# Patient Record
Sex: MALE | Race: BLACK OR AFRICAN AMERICAN | NOT HISPANIC OR LATINO | Employment: FULL TIME | URBAN - METROPOLITAN AREA
[De-identification: names, ages, dates, MRNs, and addresses within clinical notes are randomized per-mention and may not be internally consistent; named-entity substitution may affect disease eponyms.]

---

## 2023-06-22 ENCOUNTER — TELEPHONE (OUTPATIENT)
Dept: PRIMARY CARE | Facility: CLINIC | Age: 33
End: 2023-06-22
Payer: MEDICAID

## 2023-06-22 NOTE — TELEPHONE ENCOUNTER
Spoke with patients mother.Please call patients mother (legal guardian)Patients mother called to ask advice on whether she should take her son to the ER or should she schedule him with you to evaluate nose bleeds that he has had on/off

## 2023-08-06 DIAGNOSIS — E56.9 VITAMIN DEFICIENCY: Primary | ICD-10-CM

## 2023-08-06 RX ORDER — MULTIVITAMIN
1 TABLET ORAL DAILY
Qty: 30 TABLET | Refills: 11 | Status: SHIPPED | OUTPATIENT
Start: 2023-08-06 | End: 2024-08-05

## 2023-08-07 ENCOUNTER — PATIENT OUTREACH (OUTPATIENT)
Dept: PRIMARY CARE | Facility: CLINIC | Age: 33
End: 2023-08-07
Payer: MEDICAID

## 2023-08-07 DIAGNOSIS — K31.84 GASTROPARESIS: ICD-10-CM

## 2023-08-07 DIAGNOSIS — K30 GASTRIC MOTILITY DISORDER: ICD-10-CM

## 2023-08-07 NOTE — PROGRESS NOTES
Discharge Facility:[facility]  Discharge Diagnosis:Gastroparesis, Gastric Motility  Admission Date:8/2/23  Discharge Date: 8/6/23    PCP Appointment Date:Patient needs appointment  Specialist Appointment Date:   Hospital Encounter and Summary: Linked   See discharge assessment below for further details

## 2023-08-26 PROBLEM — G47.30 SLEEP-DISORDERED BREATHING: Status: ACTIVE | Noted: 2023-08-26

## 2023-08-26 PROBLEM — R06.81 WITNESSED EPISODE OF APNEA: Status: ACTIVE | Noted: 2023-08-26

## 2023-08-26 PROBLEM — B00.9 RECURRENT HSV (HERPES SIMPLEX VIRUS): Status: ACTIVE | Noted: 2023-08-26

## 2023-08-26 PROBLEM — M25.562 BILATERAL KNEE PAIN: Status: ACTIVE | Noted: 2023-08-26

## 2023-08-26 PROBLEM — R32 URINARY INCONTINENCE: Status: ACTIVE | Noted: 2023-08-26

## 2023-08-26 PROBLEM — E55.9 VITAMIN D DEFICIENCY: Status: ACTIVE | Noted: 2023-08-26

## 2023-08-26 PROBLEM — M25.551 BILATERAL HIP PAIN: Status: ACTIVE | Noted: 2023-08-26

## 2023-08-26 PROBLEM — R25.2 MUSCLE CRAMPS: Status: ACTIVE | Noted: 2023-08-26

## 2023-08-26 PROBLEM — F99 MENTAL DISORDER: Status: ACTIVE | Noted: 2023-08-26

## 2023-08-26 PROBLEM — B35.6 TINEA CRURIS: Status: ACTIVE | Noted: 2023-08-26

## 2023-08-26 PROBLEM — R73.9 HYPERGLYCEMIA: Status: ACTIVE | Noted: 2023-08-26

## 2023-08-26 PROBLEM — F91.3 OPPOSITIONAL DEFIANT DISORDER: Status: ACTIVE | Noted: 2023-08-26

## 2023-08-26 PROBLEM — J34.89 NASAL DRYNESS: Status: ACTIVE | Noted: 2023-08-26

## 2023-08-26 PROBLEM — R27.0 ATAXIA: Status: ACTIVE | Noted: 2023-08-26

## 2023-08-26 PROBLEM — E78.00 HYPERCHOLESTEROLEMIA: Status: ACTIVE | Noted: 2023-08-26

## 2023-08-26 PROBLEM — K08.9 DENTAL DISORDER: Status: ACTIVE | Noted: 2023-08-26

## 2023-08-26 PROBLEM — B35.4 TINEA CORPORIS: Status: ACTIVE | Noted: 2023-08-26

## 2023-08-26 PROBLEM — F43.10 PTSD (POST-TRAUMATIC STRESS DISORDER): Status: ACTIVE | Noted: 2023-08-26

## 2023-08-26 PROBLEM — S99.922A INJURY OF ANKLE AND FOOT, LEFT, INITIAL ENCOUNTER: Status: ACTIVE | Noted: 2023-08-26

## 2023-08-26 PROBLEM — R26.81 UNSTEADY GAIT WHEN WALKING: Status: ACTIVE | Noted: 2023-08-26

## 2023-08-26 PROBLEM — R74.8 ELEVATED CREATINE KINASE LEVEL: Status: ACTIVE | Noted: 2023-08-26

## 2023-08-26 PROBLEM — E65 CENTRAL OBESITY: Status: ACTIVE | Noted: 2023-08-26
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
## 2023-08-26 PROBLEM — S01.85XA DOG BITE OF FACE: Status: ACTIVE | Noted: 2023-08-26

## 2023-08-26 PROBLEM — M25.552 BILATERAL HIP PAIN: Status: ACTIVE | Noted: 2023-08-26

## 2023-08-26 PROBLEM — K31.89 GASTRIC DYSMOTILITY: Status: ACTIVE | Noted: 2023-08-26

## 2023-08-26 PROBLEM — F51.5 NIGHTMARE DISORDER: Status: ACTIVE | Noted: 2023-08-26

## 2023-08-26 PROBLEM — F06.31 MOOD DISORDER WITH DEPRESSIVE FEATURES DUE TO GENERAL MEDICAL CONDITION: Status: ACTIVE | Noted: 2023-08-26

## 2023-08-26 PROBLEM — R94.31 ACUTE ELECTROCARDIOGRAPHY CHANGES: Status: ACTIVE | Noted: 2023-08-26

## 2023-08-26 PROBLEM — F42.9 OBSESSIVE COMPULSIVE DISORDER: Status: ACTIVE | Noted: 2023-08-26

## 2023-08-26 PROBLEM — R25.8 NOCTURNAL LEG MOVEMENTS: Status: ACTIVE | Noted: 2023-08-26

## 2023-08-26 PROBLEM — K31.84 GASTROPARESIS: Status: ACTIVE | Noted: 2023-08-26

## 2023-08-26 PROBLEM — S93.602A FOOT SPRAIN, LEFT, INITIAL ENCOUNTER: Status: ACTIVE | Noted: 2023-08-26

## 2023-08-26 PROBLEM — E29.1 HYPOGONADISM IN MALE: Status: ACTIVE | Noted: 2023-08-26

## 2023-08-26 PROBLEM — E65 DORSAL CERVICAL FAT PAD: Status: ACTIVE | Noted: 2023-08-26

## 2023-08-26 PROBLEM — R63.5 WEIGHT GAIN: Status: ACTIVE | Noted: 2023-08-26

## 2023-08-26 PROBLEM — E66.9 OBESITY: Status: ACTIVE | Noted: 2023-08-26

## 2023-08-26 PROBLEM — R40.0 DAYTIME SOMNOLENCE: Status: ACTIVE | Noted: 2023-08-26

## 2023-08-26 PROBLEM — R79.89 LOW TESTOSTERONE IN MALE: Status: ACTIVE | Noted: 2023-08-26

## 2023-08-26 PROBLEM — F43.25 ADJUSTMENT DISORDER WITH MIXED DISTURBANCE OF EMOTIONS AND CONDUCT: Status: ACTIVE | Noted: 2023-08-26

## 2023-08-26 PROBLEM — F54 PSYCHOLOGICAL FACTOR AFFECTING PHYSICAL CONDITION: Status: ACTIVE | Noted: 2023-08-26

## 2023-08-26 PROBLEM — M25.561 BILATERAL KNEE PAIN: Status: ACTIVE | Noted: 2023-08-26

## 2023-08-26 PROBLEM — F90.9 ATTENTION-DEFICIT/HYPERACTIVITY DISORDER: Status: ACTIVE | Noted: 2023-08-26

## 2023-08-26 PROBLEM — S99.912A INJURY OF ANKLE AND FOOT, LEFT, INITIAL ENCOUNTER: Status: ACTIVE | Noted: 2023-08-26

## 2023-08-26 PROBLEM — M79.605 PAIN IN BOTH LOWER EXTREMITIES: Status: ACTIVE | Noted: 2023-08-26

## 2023-08-26 PROBLEM — Q87.11 PRADER-WILLI SYNDROME (HHS-HCC): Status: ACTIVE | Noted: 2023-08-26

## 2023-08-26 PROBLEM — R73.03 PREDIABETES: Status: ACTIVE | Noted: 2023-08-26

## 2023-08-26 PROBLEM — M40.209 KYPHOSIS, ACQUIRED: Status: ACTIVE | Noted: 2023-08-26

## 2023-08-26 PROBLEM — B96.89 SUPERFICIAL BACTERIAL INFECTION OF SKIN: Status: ACTIVE | Noted: 2023-08-26

## 2023-08-26 PROBLEM — G43.719 INTRACTABLE CHRONIC MIGRAINE WITHOUT AURA AND WITHOUT STATUS MIGRAINOSUS: Status: ACTIVE | Noted: 2023-08-26

## 2023-08-26 PROBLEM — R59.0 CERVICAL LYMPHADENOPATHY: Status: ACTIVE | Noted: 2023-08-26

## 2023-08-26 PROBLEM — K12.0 CANKER SORE: Status: ACTIVE | Noted: 2023-08-26

## 2023-08-26 PROBLEM — R74.8 ABNORMAL LIVER ENZYMES: Status: ACTIVE | Noted: 2023-08-26

## 2023-08-26 PROBLEM — J30.9 ALLERGIC RHINITIS: Status: ACTIVE | Noted: 2023-08-26

## 2023-08-26 PROBLEM — R26.89 IMPAIRMENT OF BALANCE: Status: ACTIVE | Noted: 2023-08-26

## 2023-08-26 PROBLEM — L08.9 SUPERFICIAL BACTERIAL INFECTION OF SKIN: Status: ACTIVE | Noted: 2023-08-26

## 2023-08-26 PROBLEM — M79.604 PAIN IN BOTH LOWER EXTREMITIES: Status: ACTIVE | Noted: 2023-08-26

## 2023-08-26 PROBLEM — G47.8 POOR SLEEP PATTERN: Status: ACTIVE | Noted: 2023-08-26

## 2023-08-26 PROBLEM — D64.9 ANEMIA: Status: ACTIVE | Noted: 2023-08-26

## 2023-08-26 RX ORDER — ARIPIPRAZOLE 400 MG
KIT INTRAMUSCULAR
COMMUNITY
Start: 2020-02-12 | End: 2023-10-04 | Stop reason: SDUPTHER

## 2023-08-26 RX ORDER — ERGOCALCIFEROL 1.25 MG/1
1 CAPSULE ORAL WEEKLY
COMMUNITY
Start: 2013-11-13

## 2023-08-26 RX ORDER — SERTRALINE HYDROCHLORIDE 50 MG/1
TABLET, FILM COATED ORAL
COMMUNITY
Start: 2019-07-05 | End: 2023-10-04 | Stop reason: SDUPTHER

## 2023-08-26 RX ORDER — ACETAMINOPHEN 500 MG
TABLET ORAL
COMMUNITY
Start: 2019-08-05 | End: 2024-04-23 | Stop reason: ALTCHOICE

## 2023-08-26 RX ORDER — NAPROXEN SODIUM 220 MG
TABLET ORAL
COMMUNITY
Start: 2022-09-13

## 2023-08-26 RX ORDER — CLOMIPHENE CITRATE 50 MG/1
1 TABLET ORAL DAILY
COMMUNITY
Start: 2022-08-01

## 2023-08-26 RX ORDER — SERTRALINE HYDROCHLORIDE 100 MG/1
2 TABLET, FILM COATED ORAL DAILY
COMMUNITY
Start: 2020-10-09 | End: 2023-10-04 | Stop reason: SDUPTHER

## 2023-08-26 RX ORDER — MUPIROCIN CALCIUM 20 MG/G
CREAM TOPICAL
COMMUNITY
Start: 2018-04-25

## 2023-08-26 RX ORDER — IBUPROFEN 600 MG/1
TABLET ORAL EVERY 6 HOURS PRN
COMMUNITY
Start: 2017-08-02

## 2023-08-26 RX ORDER — DEXTROAMPHETAMINE SULFATE, DEXTROAMPHETAMINE SACCHARATE, AMPHETAMINE SULFATE AND AMPHETAMINE ASPARTATE 7.5; 7.5; 7.5; 7.5 MG/1; MG/1; MG/1; MG/1
CAPSULE, EXTENDED RELEASE ORAL
COMMUNITY
Start: 2022-09-19 | End: 2023-10-04 | Stop reason: SDUPTHER

## 2023-08-26 RX ORDER — CHORIONIC GONADOTROPIN 10000 UNIT
KIT INTRAMUSCULAR
COMMUNITY
Start: 2022-09-13

## 2023-08-26 RX ORDER — VALACYCLOVIR HYDROCHLORIDE 500 MG/1
1 TABLET, FILM COATED ORAL DAILY
COMMUNITY
Start: 2018-03-26 | End: 2023-09-27 | Stop reason: SDUPTHER

## 2023-08-26 RX ORDER — PRAZOSIN HYDROCHLORIDE 2 MG/1
1 CAPSULE ORAL NIGHTLY
COMMUNITY
Start: 2019-07-05 | End: 2023-09-27 | Stop reason: SDUPTHER

## 2023-08-26 RX ORDER — KETOCONAZOLE 20 MG/ML
SHAMPOO, SUSPENSION TOPICAL
COMMUNITY
Start: 2015-01-19

## 2023-08-26 RX ORDER — UBIQUINOL 100 MG
1 CAPSULE ORAL DAILY
COMMUNITY

## 2023-08-29 ENCOUNTER — APPOINTMENT (OUTPATIENT)
Dept: PRIMARY CARE | Facility: CLINIC | Age: 33
End: 2023-08-29
Payer: MEDICAID

## 2023-09-07 ENCOUNTER — PATIENT OUTREACH (OUTPATIENT)
Dept: PRIMARY CARE | Facility: CLINIC | Age: 33
End: 2023-09-07
Payer: MEDICAID

## 2023-09-27 ENCOUNTER — OFFICE VISIT (OUTPATIENT)
Dept: PRIMARY CARE | Facility: CLINIC | Age: 33
End: 2023-09-27
Payer: MEDICAID

## 2023-09-27 VITALS
HEIGHT: 60 IN | SYSTOLIC BLOOD PRESSURE: 115 MMHG | BODY MASS INDEX: 25.72 KG/M2 | DIASTOLIC BLOOD PRESSURE: 72 MMHG | OXYGEN SATURATION: 98 % | HEART RATE: 65 BPM | WEIGHT: 131 LBS

## 2023-09-27 DIAGNOSIS — E78.00 HYPERCHOLESTEROLEMIA: ICD-10-CM

## 2023-09-27 DIAGNOSIS — D64.9 ANEMIA, UNSPECIFIED TYPE: ICD-10-CM

## 2023-09-27 DIAGNOSIS — E55.9 VITAMIN D DEFICIENCY: ICD-10-CM

## 2023-09-27 DIAGNOSIS — R73.9 HYPERGLYCEMIA: ICD-10-CM

## 2023-09-27 DIAGNOSIS — Q87.11 PRADER-WILLI SYNDROME (HHS-HCC): ICD-10-CM

## 2023-09-27 DIAGNOSIS — Z00.00 WELLNESS EXAMINATION: ICD-10-CM

## 2023-09-27 DIAGNOSIS — R73.03 PREDIABETES: ICD-10-CM

## 2023-09-27 DIAGNOSIS — B00.9 RECURRENT HSV (HERPES SIMPLEX VIRUS): Primary | ICD-10-CM

## 2023-09-27 PROCEDURE — 90471 IMMUNIZATION ADMIN: CPT | Performed by: STUDENT IN AN ORGANIZED HEALTH CARE EDUCATION/TRAINING PROGRAM

## 2023-09-27 PROCEDURE — 90686 IIV4 VACC NO PRSV 0.5 ML IM: CPT | Performed by: STUDENT IN AN ORGANIZED HEALTH CARE EDUCATION/TRAINING PROGRAM

## 2023-09-27 PROCEDURE — 1036F TOBACCO NON-USER: CPT | Performed by: STUDENT IN AN ORGANIZED HEALTH CARE EDUCATION/TRAINING PROGRAM

## 2023-09-27 PROCEDURE — 99395 PREV VISIT EST AGE 18-39: CPT | Performed by: STUDENT IN AN ORGANIZED HEALTH CARE EDUCATION/TRAINING PROGRAM

## 2023-09-27 PROCEDURE — 99213 OFFICE O/P EST LOW 20 MIN: CPT | Performed by: STUDENT IN AN ORGANIZED HEALTH CARE EDUCATION/TRAINING PROGRAM

## 2023-09-27 RX ORDER — VALACYCLOVIR HYDROCHLORIDE 500 MG/1
500 TABLET, FILM COATED ORAL DAILY
Qty: 90 TABLET | Refills: 1 | Status: SHIPPED | OUTPATIENT
Start: 2023-09-27 | End: 2024-03-25

## 2023-09-27 ASSESSMENT — COLUMBIA-SUICIDE SEVERITY RATING SCALE - C-SSRS
2. HAVE YOU ACTUALLY HAD ANY THOUGHTS OF KILLING YOURSELF?: NO
6. HAVE YOU EVER DONE ANYTHING, STARTED TO DO ANYTHING, OR PREPARED TO DO ANYTHING TO END YOUR LIFE?: NO
6. HAVE YOU EVER DONE ANYTHING, STARTED TO DO ANYTHING, OR PREPARED TO DO ANYTHING TO END YOUR LIFE?: NO
1. IN THE PAST MONTH, HAVE YOU WISHED YOU WERE DEAD OR WISHED YOU COULD GO TO SLEEP AND NOT WAKE UP?: NO
2. HAVE YOU ACTUALLY HAD ANY THOUGHTS OF KILLING YOURSELF?: NO
1. IN THE PAST MONTH, HAVE YOU WISHED YOU WERE DEAD OR WISHED YOU COULD GO TO SLEEP AND NOT WAKE UP?: NO

## 2023-09-27 ASSESSMENT — PATIENT HEALTH QUESTIONNAIRE - PHQ9
SUM OF ALL RESPONSES TO PHQ9 QUESTIONS 1 AND 2: 0
1. LITTLE INTEREST OR PLEASURE IN DOING THINGS: NOT AT ALL
2. FEELING DOWN, DEPRESSED OR HOPELESS: NOT AT ALL

## 2023-09-27 ASSESSMENT — ENCOUNTER SYMPTOMS
DEPRESSION: 0
LOSS OF SENSATION IN FEET: 0
OCCASIONAL FEELINGS OF UNSTEADINESS: 0

## 2023-09-27 NOTE — PROGRESS NOTES
33-year-old male with Prader-Willi presenting for annual exam.  No new concerns.  Patient doing relatively well.  Approximately 2 months ago, patient did eat a bunch of supplements, got zinc poisoning and bowel blockage.  Has been without issue since discharge.    Weight has been stable and doing well.  Down about 20 pounds since last year.     Hyperlipidemia  Stable, currently not medicated     Hyperglycemia  Stable     Anemia  Stable     Vitamin D deficiency  Stable, wants refills     Prader-Willi syndrome  Follows with [facility] clinic    Recurrent HSV  Stable, needs valacyclovir refill    SocHx:     - Smoking: Never  - Alcohol: None  - Drug use: None     12 point ROS reviewed and negative other than as stated in HPI        General: Alert, oriented, pleasant, in no acute distress  HEENT:   Head: normocephalic, atraumatic;   eyes: EOMI, no scleral icterus;  Neck: soft, supple, non-tender, no masses appreciated  CV: Heart with regular rate and rhythm, normal S1/S2, no murmurs  Lungs: CTAB without wheezing, rhonchi or rales; good respiratory effort, no increased work of breathing  Abdomen: soft, non-tender, non-distended, no masses appreciated  Extremities: no edema, no cyanosis  Neuro: Cranial nerves grossly intact; alert and oriented  Psych: Appropriate mood and affect        1. HM  -CBC, CMP, Lipid panel, Vit D, TSH with reflex T4  -Vaccines:     Flu: Obtained today     Tdap: 2021    2. Weight gain  -Down 20 pounds from last year, doing very well with diet     3. Hyperlipidemia  -Lipid panel ordered today     4. Hyperglycemia  -A1c ordered today     5. History of anemia  -CBC ordered today     6. Vitamin D deficiency  -Vitamin D lab ordered today     7. Prader-Willi syndrome  -Continue with Prader-Willi clinic     8.  Recurrent HSV  - Refill valacyclovir Rinku     F/U 1 year     Chris D'Amico, DO

## 2023-10-04 ENCOUNTER — CLINICAL SUPPORT (OUTPATIENT)
Dept: BEHAVIORAL HEALTH | Facility: CLINIC | Age: 33
End: 2023-10-04
Payer: MEDICAID

## 2023-10-04 ENCOUNTER — APPOINTMENT (OUTPATIENT)
Dept: BEHAVIORAL HEALTH | Facility: CLINIC | Age: 33
End: 2023-10-04
Payer: MEDICAID

## 2023-10-04 VITALS — BODY MASS INDEX: 26.5 KG/M2 | RESPIRATION RATE: 16 BRPM | WEIGHT: 135 LBS | HEIGHT: 60 IN

## 2023-10-04 DIAGNOSIS — G47.8 POOR SLEEP PATTERN: ICD-10-CM

## 2023-10-04 DIAGNOSIS — F43.10 PTSD (POST-TRAUMATIC STRESS DISORDER): ICD-10-CM

## 2023-10-04 DIAGNOSIS — Q87.11 PRADER-WILLI SYNDROME (HHS-HCC): ICD-10-CM

## 2023-10-04 DIAGNOSIS — F90.2 ATTENTION DEFICIT HYPERACTIVITY DISORDER (ADHD), COMBINED TYPE: ICD-10-CM

## 2023-10-04 DIAGNOSIS — F06.31 MOOD DISORDER WITH DEPRESSIVE FEATURES DUE TO GENERAL MEDICAL CONDITION: ICD-10-CM

## 2023-10-04 PROCEDURE — 99214 OFFICE O/P EST MOD 30 MIN: CPT | Performed by: NURSE PRACTITIONER

## 2023-10-04 RX ORDER — DEXTROAMPHETAMINE SULFATE, DEXTROAMPHETAMINE SACCHARATE, AMPHETAMINE SULFATE AND AMPHETAMINE ASPARTATE 7.5; 7.5; 7.5; 7.5 MG/1; MG/1; MG/1; MG/1
30 CAPSULE, EXTENDED RELEASE ORAL EVERY MORNING
Qty: 30 CAPSULE | Refills: 0 | Status: SHIPPED | OUTPATIENT
Start: 2023-10-04 | End: 2023-10-04 | Stop reason: SDUPTHER

## 2023-10-04 RX ORDER — DEXTROAMPHETAMINE SULFATE, DEXTROAMPHETAMINE SACCHARATE, AMPHETAMINE SULFATE AND AMPHETAMINE ASPARTATE 7.5; 7.5; 7.5; 7.5 MG/1; MG/1; MG/1; MG/1
30 CAPSULE, EXTENDED RELEASE ORAL EVERY MORNING
Qty: 30 CAPSULE | Refills: 0 | Status: SHIPPED | OUTPATIENT
Start: 2023-10-04 | End: 2023-11-16 | Stop reason: SDUPTHER

## 2023-10-04 RX ORDER — ARIPIPRAZOLE 400 MG
400 KIT INTRAMUSCULAR
Qty: 1 EACH | Refills: 5 | Status: SHIPPED | OUTPATIENT
Start: 2023-10-04 | End: 2024-01-04 | Stop reason: SDUPTHER

## 2023-10-04 RX ORDER — SERTRALINE HYDROCHLORIDE 50 MG/1
50 TABLET, FILM COATED ORAL DAILY
Qty: 30 TABLET | Refills: 5 | Status: SHIPPED | OUTPATIENT
Start: 2023-10-04 | End: 2024-01-04 | Stop reason: SDUPTHER

## 2023-10-04 RX ORDER — SERTRALINE HYDROCHLORIDE 100 MG/1
200 TABLET, FILM COATED ORAL DAILY
Qty: 60 TABLET | Refills: 5 | Status: SHIPPED | OUTPATIENT
Start: 2023-10-04 | End: 2024-01-04 | Stop reason: SDUPTHER

## 2023-10-04 RX ORDER — PRAZOSIN HYDROCHLORIDE 2 MG/1
4 CAPSULE ORAL NIGHTLY
Qty: 60 CAPSULE | Refills: 5 | Status: SHIPPED | OUTPATIENT
Start: 2023-10-04 | End: 2024-01-04 | Stop reason: SDUPTHER
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
## 2023-10-11 ENCOUNTER — TELEPHONE (OUTPATIENT)
Dept: GENETICS | Facility: CLINIC | Age: 33
End: 2023-10-11
Payer: MEDICAID

## 2023-10-11 NOTE — RESEARCH NOTES
Called the caregiver on October 10, 2023 to ask about potential rescreening date. They confirmed any day from Tuesday-Friday is good for them.

## 2023-10-11 NOTE — RESEARCH NOTES
Called the caregiver on October 5, 2023 about a potential rescreening visit for the [facility]-[id number] study. The caregiver showed interest and confirmed this with the patient. I informed them after the screening visit if they are eligible, they need to come for a 2nd visit within 30 days, and for visit 3-6 every 2 weeks after that.    The caregiver requested if she can call me on Oct 10 to tell me about some potential screening visit dates. I promised her to call on Oct 10 to get that info.

## 2023-10-24 ENCOUNTER — PROCEDURE VISIT (OUTPATIENT)
Dept: BEHAVIORAL HEALTH | Facility: CLINIC | Age: 33
End: 2023-10-24
Payer: MEDICAID

## 2023-10-24 VITALS
RESPIRATION RATE: 16 BRPM | TEMPERATURE: 98.2 F | SYSTOLIC BLOOD PRESSURE: 128 MMHG | HEART RATE: 68 BPM | DIASTOLIC BLOOD PRESSURE: 65 MMHG | WEIGHT: 138.8 LBS | HEIGHT: 60 IN | BODY MASS INDEX: 27.25 KG/M2

## 2023-10-24 DIAGNOSIS — F06.31 MOOD DISORDER WITH DEPRESSIVE FEATURES DUE TO GENERAL MEDICAL CONDITION: ICD-10-CM

## 2023-10-24 DIAGNOSIS — F43.25 ADJUSTMENT DISORDER WITH MIXED DISTURBANCE OF EMOTIONS AND CONDUCT: Primary | ICD-10-CM

## 2023-10-24 NOTE — PROGRESS NOTES
Patient here at the clinic today to receive his monthly Abilify Maintena for Mood disorder with depressive features      Patient identified by full name and  and vitals obtained. patient last seen by provider 10/4/23 , last seen by nurse on 23 . Medication verified by providers note and medication order.        Appetite: no change  Sleep: No change  Appearance: clean, age appropriate, well-groomed  Build: average  Attitude: cooperative, calm, pleasant, friendly, open  Eye Contact: normal  Activity: alert, attentive, appropriate  Speech: appropriate & spontaneous, normal  Delusions: patient denies   Thought Content: logical  Thought Process: logical  Judgement/insight: Fair  Mood: calm happy  Affect: appropriate  AH/VH/SI/HI: patient denies  Access to firearms/weapons: No  Depression: patient denies  Thoughts of hopelessness: Patient denies  Anxiety: patient denies  Self-injurious behavior: patient denies  Cravings/Urges: NA  Last use: NA  Tobacco Use: non-smoker  Spiritual or cultural practices that may affect your care or impact your health care decisions: no  Living situation lives with mom whom is his guardian   Employed: No        Patient here at the clinic today to receive his monthly Abilify Maintena for Mood disorder accompanied by his mother. Patient was ooperative and engaged during this visit. Per mom there has been no issues with previous injection and denies any issues with SI/HI, VH/AH, depression and no recent hospitalizations        Patient received injection of Abilify Maintena 400mg/2ml via 23 gauge w/o incident into right deltoid area. Patient tolerated injection well, no reaction at injection site.      LOT # [id number]  EXP MAR 2026  NDC: 15817-439-50  Injection [id number]     Patient will RTC in 4 weeks 23    Lakshmi Thompson RN

## 2023-10-26 ENCOUNTER — APPOINTMENT (OUTPATIENT)
Dept: PRIMARY CARE | Facility: CLINIC | Age: 33
End: 2023-10-26
Payer: MEDICAID

## 2023-11-08 ENCOUNTER — PATIENT OUTREACH (OUTPATIENT)
Dept: PRIMARY CARE | Facility: CLINIC | Age: 33
End: 2023-11-08
Payer: MEDICAID

## 2023-11-15 DIAGNOSIS — F90.2 ATTENTION DEFICIT HYPERACTIVITY DISORDER (ADHD), COMBINED TYPE: ICD-10-CM

## 2023-11-16 DIAGNOSIS — F90.2 ATTENTION DEFICIT HYPERACTIVITY DISORDER (ADHD), COMBINED TYPE: ICD-10-CM

## 2023-11-16 RX ORDER — DEXTROAMPHETAMINE SULFATE, DEXTROAMPHETAMINE SACCHARATE, AMPHETAMINE SULFATE AND AMPHETAMINE ASPARTATE 7.5; 7.5; 7.5; 7.5 MG/1; MG/1; MG/1; MG/1
30 CAPSULE, EXTENDED RELEASE ORAL EVERY MORNING
Qty: 30 CAPSULE | Refills: 0 | Status: SHIPPED | OUTPATIENT
Start: 2023-11-16 | End: 2023-12-16

## 2023-11-16 NOTE — PROGRESS NOTES
Request for refill of Adderall.  Chart reviewed.  OARRS ran.  Needs 3 months until his January 2024 appointment.  Refills sent to pharmacy on file.

## 2023-11-21 ENCOUNTER — PROCEDURE VISIT (OUTPATIENT)
Dept: BEHAVIORAL HEALTH | Facility: CLINIC | Age: 33
End: 2023-11-21
Payer: MEDICAID

## 2023-11-21 VITALS
HEART RATE: 68 BPM | SYSTOLIC BLOOD PRESSURE: 119 MMHG | BODY MASS INDEX: 27.94 KG/M2 | DIASTOLIC BLOOD PRESSURE: 64 MMHG | RESPIRATION RATE: 16 BRPM | TEMPERATURE: 97.8 F | WEIGHT: 133.7 LBS

## 2023-11-21 DIAGNOSIS — F06.31 MOOD DISORDER WITH DEPRESSIVE FEATURES DUE TO GENERAL MEDICAL CONDITION: Primary | ICD-10-CM

## 2023-11-21 ASSESSMENT — PAIN SCALES - GENERAL: PAINLEVEL: 0-NO PAIN

## 2023-11-21 NOTE — PROGRESS NOTES
Patient here at the clinic today to receive his monthly Abilify Maintena for Mood disorder with depressive features      Patient identified by full name and  and vitals obtained. patient last seen by provider 10/4/23 , last seen by nurse on 10/24/23 . Medication verified by providers note and medication order.        Appetite: no change  Sleep: No change  Appearance: clean, age appropriate, well-groomed  Build: average  Attitude: cooperative, calm, pleasant, friendly, open  Eye Contact: normal  Activity: alert, attentive, appropriate  Speech: appropriate & spontaneous, normal  Delusions: patient denies   Thought Content: logical  Thought Process: logical  Judgement/insight: Fair  Mood: calm happy  Affect: appropriate  AH/VH/SI/HI: patient denies  Access to firearms/weapons: No  Depression: patient denies  Thoughts of hopelessness: Patient denies  Anxiety: patient denies  Self-injurious behavior: patient denies  Cravings/Urges: NA  Last use: NA  Tobacco Use: non-smoker  Spiritual or cultural practices that may affect your care or impact your health care decisions: no  Living situation lives with mom whom is his guardian   Employed: No        Patient here at the clinic today to receive his monthly Abilify Maintena for Mood disorder accompanied by his mother. Patient was ooperative and engaged during this visit. Per mom there has been no issues with previous injection and denies any issues with SI/HI, VH/AH, depression and no recent hospitalizations      Patient received injection of Abilify Maintena 400mg/2ml via 23 gauge w/o incident into left deltoid area. Patient tolerated injection well, no reaction at injection site.      LOT # [id number]  EXP 2026  NDC: 84253-990-19 this is the correct NDC # epic does not have it in the system       Patient will RTC in 4 weeks 23    Daisy Lynn,RN, BSN

## 2023-12-19 ENCOUNTER — PROCEDURE VISIT (OUTPATIENT)
Dept: BEHAVIORAL HEALTH | Facility: CLINIC | Age: 33
End: 2023-12-19
Payer: MEDICAID

## 2023-12-19 VITALS
RESPIRATION RATE: 16 BRPM | HEART RATE: 87 BPM | DIASTOLIC BLOOD PRESSURE: 82 MMHG | TEMPERATURE: 97.8 F | BODY MASS INDEX: 29.8 KG/M2 | WEIGHT: 142.6 LBS | SYSTOLIC BLOOD PRESSURE: 126 MMHG

## 2023-12-19 DIAGNOSIS — F06.31 MOOD DISORDER WITH DEPRESSIVE FEATURES DUE TO GENERAL MEDICAL CONDITION: Primary | ICD-10-CM

## 2023-12-19 ASSESSMENT — PAIN SCALES - GENERAL: PAINLEVEL: 0-NO PAIN

## 2023-12-19 NOTE — PROGRESS NOTES
Patient here at the clinic today to receive his monthly Abilify Maintena for Mood disorder with depressive features      Patient identified by full name and  and vitals obtained. patient last seen by provider 10/4/23 , last seen by nurse on 23 . Medication verified by providers note and medication order.        Appetite: no change  Sleep: No change  Appearance: clean, age appropriate, well-groomed  Build: average  Attitude: cooperative, calm, pleasant, friendly, open  Eye Contact: normal  Activity: alert, attentive, appropriate  Speech: appropriate & spontaneous, normal  Delusions: patient denies   Thought Content: logical  Thought Process: logical  Judgement/insight: Fair  Mood: calm happy  Affect: appropriate  AH/VH/SI/HI: patient denies  Access to firearms/weapons: No  Depression: patient denies  Thoughts of hopelessness: Patient denies  Anxiety: patient denies  Self-injurious behavior: patient denies  Cravings/Urges: NA  Last use: NA  Tobacco Use: non-smoker  Spiritual or cultural practices that may affect your care or impact your health care decisions: no  Living situation lives with mom whom is his guardian   Employed: No        Patient here at the clinic today to receive his monthly Abilify Maintena for Mood disorder accompanied by his mother. Patient was ooperative and engaged during this visit. Per mom there has been no issues with previous injection and denies any issues with SI/HI, VH/AH, depression and no recent hospitalizations      Patient received injection of Abilify Maintena 400mg/2ml via 23 gauge w/o incident into right deltoid area. Patient tolerated injection well, no reaction at injection site.      LOT # [id number]  EXP 2026  NDC: 09489-714-40 this is the correct NDC # epic does not have it in the system       Patient will RTC in 4 weeks 24    Daisy Lynn,RN, BSN

## 2024-01-04 ENCOUNTER — OFFICE VISIT (OUTPATIENT)
Dept: BEHAVIORAL HEALTH | Facility: CLINIC | Age: 34
End: 2024-01-04
Payer: MEDICAID

## 2024-01-04 DIAGNOSIS — Q87.11 PRADER-WILLI SYNDROME (HHS-HCC): ICD-10-CM

## 2024-01-04 DIAGNOSIS — F06.31 MOOD DISORDER WITH DEPRESSIVE FEATURES DUE TO GENERAL MEDICAL CONDITION: Primary | ICD-10-CM

## 2024-01-04 DIAGNOSIS — F43.10 PTSD (POST-TRAUMATIC STRESS DISORDER): ICD-10-CM

## 2024-01-04 DIAGNOSIS — F90.2 ATTENTION DEFICIT HYPERACTIVITY DISORDER (ADHD), COMBINED TYPE: ICD-10-CM

## 2024-01-04 PROCEDURE — 99214 OFFICE O/P EST MOD 30 MIN: CPT | Performed by: NURSE PRACTITIONER

## 2024-01-04 PROCEDURE — 1036F TOBACCO NON-USER: CPT | Performed by: NURSE PRACTITIONER

## 2024-01-04 RX ORDER — PRAZOSIN HYDROCHLORIDE 2 MG/1
4 CAPSULE ORAL NIGHTLY
Qty: 60 CAPSULE | Refills: 5 | Status: SHIPPED | OUTPATIENT
Start: 2024-01-04 | End: 2024-07-02

## 2024-01-04 RX ORDER — DEXTROAMPHETAMINE SACCHARATE, AMPHETAMINE ASPARTATE MONOHYDRATE, DEXTROAMPHETAMINE SULFATE AND AMPHETAMINE SULFATE 1.25; 1.25; 1.25; 1.25 MG/1; MG/1; MG/1; MG/1
5 CAPSULE, EXTENDED RELEASE ORAL EVERY MORNING
Qty: 30 CAPSULE | Refills: 0 | Status: SHIPPED | OUTPATIENT
Start: 2024-02-03 | End: 2024-02-20 | Stop reason: SINTOL

## 2024-01-04 RX ORDER — DEXTROAMPHETAMINE SACCHARATE, AMPHETAMINE ASPARTATE MONOHYDRATE, DEXTROAMPHETAMINE SULFATE AND AMPHETAMINE SULFATE 6.25; 6.25; 6.25; 6.25 MG/1; MG/1; MG/1; MG/1
25 CAPSULE, EXTENDED RELEASE ORAL EVERY MORNING
Qty: 30 CAPSULE | Refills: 0 | Status: SHIPPED | OUTPATIENT
Start: 2024-02-03 | End: 2024-02-20 | Stop reason: SINTOL

## 2024-01-04 RX ORDER — SERTRALINE HYDROCHLORIDE 50 MG/1
50 TABLET, FILM COATED ORAL DAILY
Qty: 30 TABLET | Refills: 3 | Status: SHIPPED | OUTPATIENT
Start: 2024-01-04 | End: 2024-05-03

## 2024-01-04 RX ORDER — DEXTROAMPHETAMINE SACCHARATE, AMPHETAMINE ASPARTATE MONOHYDRATE, DEXTROAMPHETAMINE SULFATE AND AMPHETAMINE SULFATE 1.25; 1.25; 1.25; 1.25 MG/1; MG/1; MG/1; MG/1
5 CAPSULE, EXTENDED RELEASE ORAL EVERY MORNING
Qty: 30 CAPSULE | Refills: 0 | Status: SHIPPED | OUTPATIENT
Start: 2024-03-04 | End: 2024-02-20 | Stop reason: SINTOL

## 2024-01-04 RX ORDER — DEXTROAMPHETAMINE SACCHARATE, AMPHETAMINE ASPARTATE MONOHYDRATE, DEXTROAMPHETAMINE SULFATE AND AMPHETAMINE SULFATE 6.25; 6.25; 6.25; 6.25 MG/1; MG/1; MG/1; MG/1
25 CAPSULE, EXTENDED RELEASE ORAL EVERY MORNING
Qty: 30 CAPSULE | Refills: 0 | Status: SHIPPED | OUTPATIENT
Start: 2024-01-04 | End: 2024-02-20 | Stop reason: SINTOL

## 2024-01-04 RX ORDER — DEXTROAMPHETAMINE SULFATE, DEXTROAMPHETAMINE SACCHARATE, AMPHETAMINE SULFATE AND AMPHETAMINE ASPARTATE 1.25; 1.25; 1.25; 1.25 MG/1; MG/1; MG/1; MG/1
5 CAPSULE, EXTENDED RELEASE ORAL EVERY MORNING
Qty: 30 CAPSULE | Refills: 0 | Status: SHIPPED | OUTPATIENT
Start: 2024-01-04 | End: 2024-04-23

## 2024-01-04 RX ORDER — ARIPIPRAZOLE 400 MG
400 KIT INTRAMUSCULAR
Qty: 1 EACH | Refills: 5 | Status: SHIPPED | OUTPATIENT
Start: 2024-01-04 | End: 2024-03-27 | Stop reason: SDUPTHER

## 2024-01-04 RX ORDER — SERTRALINE HYDROCHLORIDE 100 MG/1
200 TABLET, FILM COATED ORAL DAILY
Qty: 60 TABLET | Refills: 3 | Status: SHIPPED | OUTPATIENT
Start: 2024-01-04 | End: 2024-05-03

## 2024-01-04 RX ORDER — BUSPIRONE HYDROCHLORIDE 5 MG/1
5 TABLET ORAL NIGHTLY
Qty: 30 TABLET | Refills: 3 | Status: SHIPPED | OUTPATIENT
Start: 2024-01-04 | End: 2024-05-03

## 2024-01-04 RX ORDER — DEXTROAMPHETAMINE SACCHARATE, AMPHETAMINE ASPARTATE MONOHYDRATE, DEXTROAMPHETAMINE SULFATE AND AMPHETAMINE SULFATE 6.25; 6.25; 6.25; 6.25 MG/1; MG/1; MG/1; MG/1
25 CAPSULE, EXTENDED RELEASE ORAL EVERY MORNING
Qty: 30 CAPSULE | Refills: 0 | Status: SHIPPED | OUTPATIENT
Start: 2024-03-04 | End: 2024-02-20 | Stop reason: SINTOL
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
## 2024-01-16 ENCOUNTER — PROCEDURE VISIT (OUTPATIENT)
Dept: BEHAVIORAL HEALTH | Facility: CLINIC | Age: 34
End: 2024-01-16
Payer: MEDICAID

## 2024-01-16 VITALS
HEART RATE: 77 BPM | TEMPERATURE: 97.7 F | DIASTOLIC BLOOD PRESSURE: 76 MMHG | SYSTOLIC BLOOD PRESSURE: 129 MMHG | RESPIRATION RATE: 16 BRPM

## 2024-01-16 DIAGNOSIS — F06.31 MOOD DISORDER WITH DEPRESSIVE FEATURES DUE TO GENERAL MEDICAL CONDITION: Primary | ICD-10-CM

## 2024-01-16 ASSESSMENT — PAIN SCALES - GENERAL: PAINLEVEL: 0-NO PAIN

## 2024-01-16 NOTE — PROGRESS NOTES
Patient here at the clinic today to receive his monthly Abilify Maintena for Mood disorder with depressive features      Patient identified by full name and  and vitals obtained. patient last seen by provider 24 , last seen by nurse on 23 . Medication verified by providers note and medication order.        Appetite: no change  Sleep: No change  Appearance: clean, age appropriate, well-groomed  Build: average  Attitude: cooperative, calm, pleasant, friendly, open  Eye Contact: normal  Activity: alert, attentive, appropriate  Speech: appropriate & spontaneous, normal  Delusions: patient denies   Thought Content: logical  Thought Process: logical  Judgement/insight: Fair  Mood: calm happy  Affect: appropriate  AH/VH/SI/HI: patient denies  Access to firearms/weapons: No  Depression: patient denies  Thoughts of hopelessness: Patient denies  Anxiety: patient denies  Self-injurious behavior: patient denies  Cravings/Urges: NA  Last use: NA  Tobacco Use: non-smoker  Spiritual or cultural practices that may affect your care or impact your health care decisions: no  Living situation lives with mom whom is his guardian   Employed: No        Patient here at the clinic today to receive his monthly Abilify Maintena for Mood disorder accompanied by his mother. Patient was cooperative and engaged during this visit. Per mom there has been no issues with previous injection and denies any issues with SI/HI, VH/AH, depression and no recent hospitalizations      Patient received injection of Abilify Maintena 400mg/2ml via 23 gauge w/o incident into left deltoid area. Patient tolerated injection well, no reaction at injection site.      LOT # [id number]  EXP 2026  NDC: 65868-336-31 this is the correct NDC # epic does not have it in the system       Patient will RTC in 4 weeks 24    Daisy Lynn,RN, BSN

## 2024-01-24 ENCOUNTER — APPOINTMENT (OUTPATIENT)
Dept: BEHAVIORAL HEALTH | Facility: CLINIC | Age: 34
End: 2024-01-24
Payer: MEDICAID

## 2024-02-13 ENCOUNTER — PROCEDURE VISIT (OUTPATIENT)
Dept: BEHAVIORAL HEALTH | Facility: CLINIC | Age: 34
End: 2024-02-13
Payer: MEDICAID

## 2024-02-13 VITALS
SYSTOLIC BLOOD PRESSURE: 111 MMHG | WEIGHT: 142.5 LBS | RESPIRATION RATE: 16 BRPM | BODY MASS INDEX: 29.78 KG/M2 | HEART RATE: 79 BPM | TEMPERATURE: 97.8 F | DIASTOLIC BLOOD PRESSURE: 76 MMHG

## 2024-02-13 DIAGNOSIS — F06.31 MOOD DISORDER WITH DEPRESSIVE FEATURES DUE TO GENERAL MEDICAL CONDITION: Primary | ICD-10-CM

## 2024-02-13 ASSESSMENT — PAIN SCALES - GENERAL: PAINLEVEL: 0-NO PAIN

## 2024-02-13 NOTE — PROGRESS NOTES
Patient here at the clinic today to receive his monthly Abilify Maintena for Mood disorder with depressive features      Patient identified by full name and  and vitals obtained. patient last seen by provider 24 , last seen by nurse on 24 . Medication verified by providers note and medication order.        Appetite: no change  Sleep: No change  Appearance: clean, age appropriate, well-groomed  Build: average  Attitude: cooperative, calm, pleasant, friendly, open  Eye Contact: normal  Activity: alert, attentive, appropriate  Speech: appropriate & spontaneous, normal  Delusions: patient denies   Thought Content: logical  Thought Process: logical  Judgement/insight: Fair  Mood: calm happy  Affect: appropriate  AH/VH/SI/HI: patient denies  Access to firearms/weapons: No  Depression: patient denies  Thoughts of hopelessness: Patient denies  Anxiety: patient denies  Self-injurious behavior: patient denies  Cravings/Urges: NA  Last use: NA  Tobacco Use: non-smoker  Spiritual or cultural practices that may affect your care or impact your health care decisions: no  Living situation lives with mom whom is his guardian   Employed: No        Patient here at the clinic today to receive his monthly Abilify Maintena for Mood disorder accompanied by his mother. Patient was cooperative and engaged during this visit. Per mom there has been no issues with previous injection and denies any issues with SI/HI, VH/AH, depression and no recent hospitalizations      Patient received injection of Abilify Maintena 400mg/2ml via 23 gauge w/o incident into right deltoid area. Patient tolerated injection well, no reaction at injection site.      LOT # [id number]  EXP 2026  NDC: 36225-710-61 this is the correct NDC # epic does not have it in the system       Patient will RTC in 4 weeks 3/12/24    Daisy Lynn,RN, BSN

## 2024-02-15 DIAGNOSIS — F90.2 ATTENTION DEFICIT HYPERACTIVITY DISORDER (ADHD), COMBINED TYPE: ICD-10-CM

## 2024-02-15 RX ORDER — DEXTROAMPHETAMINE SULFATE, DEXTROAMPHETAMINE SACCHARATE, AMPHETAMINE SULFATE AND AMPHETAMINE ASPARTATE 7.5; 7.5; 7.5; 7.5 MG/1; MG/1; MG/1; MG/1
30 CAPSULE, EXTENDED RELEASE ORAL EVERY MORNING
Qty: 30 CAPSULE | Refills: 0 | OUTPATIENT
Start: 2024-02-15 | End: 2024-03-16

## 2024-02-20 ENCOUNTER — TELEPHONE (OUTPATIENT)
Dept: BEHAVIORAL HEALTH | Facility: CLINIC | Age: 34
End: 2024-02-20
Payer: MEDICAID

## 2024-02-20 DIAGNOSIS — F90.2 ATTENTION DEFICIT HYPERACTIVITY DISORDER (ADHD), COMBINED TYPE: ICD-10-CM

## 2024-02-20 RX ORDER — DEXTROAMPHETAMINE SULFATE, DEXTROAMPHETAMINE SACCHARATE, AMPHETAMINE SULFATE AND AMPHETAMINE ASPARTATE 7.5; 7.5; 7.5; 7.5 MG/1; MG/1; MG/1; MG/1
30 CAPSULE, EXTENDED RELEASE ORAL EVERY MORNING
Qty: 30 CAPSULE | Refills: 0 | Status: SHIPPED | OUTPATIENT
Start: 2024-03-21 | End: 2024-04-20

## 2024-02-20 RX ORDER — DEXTROAMPHETAMINE SULFATE, DEXTROAMPHETAMINE SACCHARATE, AMPHETAMINE SULFATE AND AMPHETAMINE ASPARTATE 7.5; 7.5; 7.5; 7.5 MG/1; MG/1; MG/1; MG/1
30 CAPSULE, EXTENDED RELEASE ORAL EVERY MORNING
Qty: 30 CAPSULE | Refills: 0 | Status: SHIPPED | OUTPATIENT
Start: 2024-02-20 | End: 2024-03-21

## 2024-02-20 RX ORDER — DEXTROAMPHETAMINE SULFATE, DEXTROAMPHETAMINE SACCHARATE, AMPHETAMINE SULFATE AND AMPHETAMINE ASPARTATE 7.5; 7.5; 7.5; 7.5 MG/1; MG/1; MG/1; MG/1
30 CAPSULE, EXTENDED RELEASE ORAL EVERY MORNING
Qty: 30 CAPSULE | Refills: 0 | Status: SHIPPED | OUTPATIENT
Start: 2024-04-20 | End: 2024-05-20

## 2024-03-05 ENCOUNTER — APPOINTMENT (OUTPATIENT)
Dept: BEHAVIORAL HEALTH | Facility: CLINIC | Age: 34
End: 2024-03-05
Payer: MEDICAID

## 2024-03-12 ENCOUNTER — PROCEDURE VISIT (OUTPATIENT)
Dept: BEHAVIORAL HEALTH | Facility: CLINIC | Age: 34
End: 2024-03-12
Payer: MEDICAID

## 2024-03-12 ENCOUNTER — TELEPHONE (OUTPATIENT)
Dept: OTHER | Age: 34
End: 2024-03-12

## 2024-03-12 VITALS
TEMPERATURE: 97.8 F | RESPIRATION RATE: 16 BRPM | WEIGHT: 149.4 LBS | BODY MASS INDEX: 31.22 KG/M2 | SYSTOLIC BLOOD PRESSURE: 110 MMHG | DIASTOLIC BLOOD PRESSURE: 68 MMHG | HEART RATE: 91 BPM

## 2024-03-12 DIAGNOSIS — F06.31 MOOD DISORDER WITH DEPRESSIVE FEATURES DUE TO GENERAL MEDICAL CONDITION: Primary | ICD-10-CM

## 2024-03-12 ASSESSMENT — PAIN SCALES - GENERAL: PAINLEVEL: 0-NO PAIN

## 2024-03-12 NOTE — PROGRESS NOTES
Patient here at the clinic today to receive his monthly Abilify Maintena for Mood disorder with depressive features      Patient identified by full name and  and vitals obtained. patient last seen by provider 24 , last seen by nurse on 24 . Medication verified by providers note and medication order.        Appetite: no change  Sleep: No change  Appearance: clean, age appropriate, well-groomed  Build: average  Attitude: cooperative, calm, pleasant, friendly, open  Eye Contact: normal  Activity: alert, attentive, appropriate  Speech: appropriate & spontaneous, normal  Delusions: patient denies   Thought Content: logical  Thought Process: logical  Judgement/insight: Fair  Mood: calm happy  Affect: appropriate  AH/VH/SI/HI: patient denies  Access to firearms/weapons: No  Depression: patient denies  Thoughts of hopelessness: Patient denies  Anxiety: patient denies  Self-injurious behavior: patient denies  Cravings/Urges: NA  Last use: NA  Tobacco Use: non-smoker  Spiritual or cultural practices that may affect your care or impact your health care decisions: no  Living situation lives with mom whom is his guardian   Employed: No        Patient here at the clinic today to receive his monthly Abilify Maintena for Mood disorder accompanied by his mother. Patient was cooperative and engaged during this visit. Per mom there has been no issues with previous injection and denies any issues with SI/HI, VH/AH, depression and no recent hospitalizations      Patient received injection of Abilify Maintena 400mg/2ml via 23 gauge w/o incident into  left deltoid area. Patient tolerated injection well, no reaction at injection site.      LOT # [id number]  EXP:  2026  NDC: 06843-268-56 this is the correct NDC # epic does not have it in the system       Patient will RTC in 4 weeks 24    Daisy Lynn,RN, BSN

## 2024-03-12 NOTE — TELEPHONE ENCOUNTER
Parent Iwona called stating they lost track of time and to ask if you are willing to give injection sometime later today. Please follow-up with patient.

## 2024-03-27 ENCOUNTER — APPOINTMENT (OUTPATIENT)
Dept: BEHAVIORAL HEALTH | Facility: CLINIC | Age: 34
End: 2024-03-27
Payer: MEDICAID

## 2024-03-27 ENCOUNTER — TELEPHONE (OUTPATIENT)
Dept: BEHAVIORAL HEALTH | Facility: CLINIC | Age: 34
End: 2024-03-27

## 2024-03-27 DIAGNOSIS — F06.31 MOOD DISORDER WITH DEPRESSIVE FEATURES DUE TO GENERAL MEDICAL CONDITION: ICD-10-CM

## 2024-03-27 RX ORDER — ARIPIPRAZOLE 400 MG
400 KIT INTRAMUSCULAR
Qty: 1 EACH | Refills: 1 | Status: SHIPPED | OUTPATIENT
Start: 2024-03-27

## 2024-03-27 NOTE — PROGRESS NOTES
Sent refill on Abilify Maintena injection to [facility] pharmacy for delivery to [facility].    Adderall XR 30mg refill for 30 days sent on 3/21/24.    Sertraline, buspirone, and prazosin were refilled in January 2024 with refills lasting until April/May.

## 2024-03-28 ENCOUNTER — TELEPHONE (OUTPATIENT)
Dept: BEHAVIORAL HEALTH | Facility: CLINIC | Age: 34
End: 2024-03-28
Payer: MEDICAID

## 2024-04-04 ENCOUNTER — APPOINTMENT (OUTPATIENT)
Dept: BEHAVIORAL HEALTH | Facility: CLINIC | Age: 34
End: 2024-04-04
Payer: MEDICAID

## 2024-04-09 ENCOUNTER — PROCEDURE VISIT (OUTPATIENT)
Dept: BEHAVIORAL HEALTH | Facility: CLINIC | Age: 34
End: 2024-04-09
Payer: MEDICAID

## 2024-04-09 VITALS
HEART RATE: 52 BPM | TEMPERATURE: 98 F | SYSTOLIC BLOOD PRESSURE: 114 MMHG | BODY MASS INDEX: 29.02 KG/M2 | HEIGHT: 60 IN | RESPIRATION RATE: 16 BRPM | WEIGHT: 147.8 LBS | DIASTOLIC BLOOD PRESSURE: 72 MMHG

## 2024-04-09 DIAGNOSIS — F06.31 MOOD DISORDER WITH DEPRESSIVE FEATURES DUE TO GENERAL MEDICAL CONDITION: Primary | ICD-10-CM

## 2024-04-09 NOTE — PROGRESS NOTES
Patient here at the clinic today to receive his monthly Abilify Maintena for Mood disorder with depressive features      Patient identified by full name and  and vitals obtained. patient last seen by provider 24 , last seen by nurse on 3/12/24 . Medication verified by providers note and medication order.        Appetite: no change  Sleep: No change  Appearance: clean, age appropriate, well-groomed  Build: average  Attitude: cooperative, calm, pleasant, friendly, open  Eye Contact: normal  Activity: alert, attentive, appropriate  Speech: appropriate & spontaneous, normal  Delusions: patient denies   Thought Content: logical  Thought Process: logical  Judgement/insight: Fair  Mood: calm happy  Affect: appropriate  AH/VH/SI/HI: patient denies  Access to firearms/weapons: No  Depression: patient denies  Thoughts of hopelessness: Patient denies  Anxiety: patient denies  Self-injurious behavior: patient denies  Cravings/Urges: NA  Last use: NA  Tobacco Use: non-smoker  Spiritual or cultural practices that may affect your care or impact your health care decisions: no  Living situation lives with mom whom is his guardian   Employed: No        Patient here at the clinic today to receive his monthly Abilify Maintena for Mood disorder accompanied by his mother. Patient was cooperative and engaged during this visit. Per mom there has been no issues with previous injection and denies any issues with SI/HI, VH/AH, depression and no recent hospitalizations      Patient received injection of Abilify Maintena 400mg/2ml via 23 gauge w/o incident into right deltoid area. Patient tolerated injection well, no reaction at injection site.      LOT # [id number]  EXP:  2026  NDC: 37273-078-24        Patient will RTC in 4 weeks    Lakshmi Thompson,RN, BSN

## 2024-04-23 ENCOUNTER — OFFICE VISIT (OUTPATIENT)
Dept: PRIMARY CARE | Facility: CLINIC | Age: 34
End: 2024-04-23
Payer: MEDICAID

## 2024-04-23 ENCOUNTER — LAB (OUTPATIENT)
Dept: LAB | Facility: LAB | Age: 34
End: 2024-04-23
Payer: MEDICAID

## 2024-04-23 VITALS
DIASTOLIC BLOOD PRESSURE: 56 MMHG | WEIGHT: 155 LBS | SYSTOLIC BLOOD PRESSURE: 101 MMHG | HEART RATE: 78 BPM | BODY MASS INDEX: 30.43 KG/M2 | HEIGHT: 60 IN | OXYGEN SATURATION: 98 %

## 2024-04-23 DIAGNOSIS — B00.9 RECURRENT HSV (HERPES SIMPLEX VIRUS): ICD-10-CM

## 2024-04-23 DIAGNOSIS — Q87.11 PRADER-WILLI SYNDROME (HHS-HCC): ICD-10-CM

## 2024-04-23 DIAGNOSIS — R73.9 HYPERGLYCEMIA: ICD-10-CM

## 2024-04-23 DIAGNOSIS — E55.9 VITAMIN D DEFICIENCY: ICD-10-CM

## 2024-04-23 DIAGNOSIS — D64.9 ANEMIA, UNSPECIFIED TYPE: ICD-10-CM

## 2024-04-23 DIAGNOSIS — E78.00 HYPERCHOLESTEROLEMIA: ICD-10-CM

## 2024-04-23 DIAGNOSIS — R82.90 MALODOROUS URINE: ICD-10-CM

## 2024-04-23 DIAGNOSIS — E55.9 VITAMIN D DEFICIENCY: Primary | ICD-10-CM

## 2024-04-23 DIAGNOSIS — R73.03 PREDIABETES: ICD-10-CM

## 2024-04-23 LAB
APPEARANCE UR: CLEAR
BILIRUB UR STRIP.AUTO-MCNC: NEGATIVE MG/DL
COLOR UR: NORMAL
GLUCOSE UR STRIP.AUTO-MCNC: NORMAL MG/DL
KETONES UR STRIP.AUTO-MCNC: NEGATIVE MG/DL
LEUKOCYTE ESTERASE UR QL STRIP.AUTO: NEGATIVE
NITRITE UR QL STRIP.AUTO: NEGATIVE
PH UR STRIP.AUTO: 6.5 [PH]
PROT UR STRIP.AUTO-MCNC: NEGATIVE MG/DL
RBC # UR STRIP.AUTO: NEGATIVE /UL
SP GR UR STRIP.AUTO: 1.02
UROBILINOGEN UR STRIP.AUTO-MCNC: NORMAL MG/DL

## 2024-04-23 PROCEDURE — 84443 ASSAY THYROID STIM HORMONE: CPT

## 2024-04-23 PROCEDURE — 82306 VITAMIN D 25 HYDROXY: CPT

## 2024-04-23 PROCEDURE — 99214 OFFICE O/P EST MOD 30 MIN: CPT | Performed by: STUDENT IN AN ORGANIZED HEALTH CARE EDUCATION/TRAINING PROGRAM

## 2024-04-23 PROCEDURE — 36415 COLL VENOUS BLD VENIPUNCTURE: CPT

## 2024-04-23 PROCEDURE — 80053 COMPREHEN METABOLIC PANEL: CPT

## 2024-04-23 PROCEDURE — 85027 COMPLETE CBC AUTOMATED: CPT

## 2024-04-23 PROCEDURE — 80061 LIPID PANEL: CPT

## 2024-04-23 PROCEDURE — 83036 HEMOGLOBIN GLYCOSYLATED A1C: CPT

## 2024-04-23 PROCEDURE — 1036F TOBACCO NON-USER: CPT | Performed by: STUDENT IN AN ORGANIZED HEALTH CARE EDUCATION/TRAINING PROGRAM

## 2024-04-23 PROCEDURE — 81003 URINALYSIS AUTO W/O SCOPE: CPT

## 2024-04-23 ASSESSMENT — COLUMBIA-SUICIDE SEVERITY RATING SCALE - C-SSRS
2. HAVE YOU ACTUALLY HAD ANY THOUGHTS OF KILLING YOURSELF?: NO
6. HAVE YOU EVER DONE ANYTHING, STARTED TO DO ANYTHING, OR PREPARED TO DO ANYTHING TO END YOUR LIFE?: NO
1. IN THE PAST MONTH, HAVE YOU WISHED YOU WERE DEAD OR WISHED YOU COULD GO TO SLEEP AND NOT WAKE UP?: NO

## 2024-04-23 ASSESSMENT — PATIENT HEALTH QUESTIONNAIRE - PHQ9
1. LITTLE INTEREST OR PLEASURE IN DOING THINGS: NOT AT ALL
SUM OF ALL RESPONSES TO PHQ9 QUESTIONS 1 AND 2: 0
2. FEELING DOWN, DEPRESSED OR HOPELESS: NOT AT ALL

## 2024-04-23 ASSESSMENT — ENCOUNTER SYMPTOMS
DEPRESSION: 0
OCCASIONAL FEELINGS OF UNSTEADINESS: 0
LOSS OF SENSATION IN FEET: 0

## 2024-04-23 NOTE — PROGRESS NOTES
34-year-old male with Prader-Willi presenting for annual exam.  No new concerns.      Weight has been a little worse, has gained back the weight he lost at last appointment.  Mother reports that the Prader-Willi specialist had told him to not be so restrictive on his diet.  He reports that he understands which foods are healthy and which are junk, and knows that he should eat more healthy food.     Hyperlipidemia  Stable, currently not medicated     Hyperglycemia  Stable, not currently medicated     Anemia  Stable     Vitamin D deficiency  Stable, wants refills     Prader-Willi syndrome  Follows with Prader-Willi clinic    Recurrent HSV  Stable on current regimen    SocHx:     - Smoking: Never  - Alcohol: None  - Drug use: None     12 point ROS reviewed and negative other than as stated in HPI        General: Alert, oriented, pleasant, in no acute distress  HEENT:   Head: normocephalic, atraumatic;   eyes: EOMI, no scleral icterus;  Neck: soft, supple, non-tender, no masses appreciated  CV: Heart with regular rate and rhythm, normal S1/S2, no murmurs  Lungs: CTAB without wheezing, rhonchi or rales; good respiratory effort, no increased work of breathing  Neuro: Cranial nerves grossly intact; alert and oriented  Psych: Appropriate mood and affect     # Weight gain  - Has gained back weight that was lost, understands that he is eating more unhealthy foods than before, was told by Prader-Willi specialist to be less restrictive on his diet     # Hyperlipidemia  -no medication currently  -Lipid panel ordered today     #Hyperglycemia  -A1c ordered today     #History of anemia  -CBC ordered today     #Vitamin D deficiency  -Vitamin D lab ordered today     # Prader-Willi syndrome  -Continue with Prader-Willi clinic     # Recurrent HSV  - Refill valacyclovir      F/U 6-12 months, CPE due in September     Chris D'Amico, DO

## 2024-04-24 ENCOUNTER — TELEPHONE (OUTPATIENT)
Dept: PRIMARY CARE | Facility: CLINIC | Age: 34
End: 2024-04-24
Payer: MEDICAID

## 2024-04-24 LAB
25(OH)D3 SERPL-MCNC: 34 NG/ML (ref 30–100)
ALBUMIN SERPL BCP-MCNC: 4.2 G/DL (ref 3.4–5)
ALP SERPL-CCNC: 45 U/L (ref 33–120)
ALT SERPL W P-5'-P-CCNC: 22 U/L (ref 10–52)
ANION GAP SERPL CALC-SCNC: 14 MMOL/L (ref 10–20)
AST SERPL W P-5'-P-CCNC: 22 U/L (ref 9–39)
BILIRUB SERPL-MCNC: 0.4 MG/DL (ref 0–1.2)
BUN SERPL-MCNC: 13 MG/DL (ref 6–23)
CALCIUM SERPL-MCNC: 9.7 MG/DL (ref 8.6–10.6)
CHLORIDE SERPL-SCNC: 105 MMOL/L (ref 98–107)
CHOLEST SERPL-MCNC: 156 MG/DL (ref 0–199)
CHOLESTEROL/HDL RATIO: 2.1
CO2 SERPL-SCNC: 29 MMOL/L (ref 21–32)
CREAT SERPL-MCNC: 0.84 MG/DL (ref 0.5–1.3)
EGFRCR SERPLBLD CKD-EPI 2021: >90 ML/MIN/1.73M*2
ERYTHROCYTE [DISTWIDTH] IN BLOOD BY AUTOMATED COUNT: 13.4 % (ref 11.5–14.5)
EST. AVERAGE GLUCOSE BLD GHB EST-MCNC: 117 MG/DL
GLUCOSE SERPL-MCNC: 87 MG/DL (ref 74–99)
HBA1C MFR BLD: 5.7 %
HCT VFR BLD AUTO: 39.6 % (ref 41–52)
HDLC SERPL-MCNC: 73 MG/DL
HGB BLD-MCNC: 12.5 G/DL (ref 13.5–17.5)
LDLC SERPL CALC-MCNC: 71 MG/DL
MCH RBC QN AUTO: 26.9 PG (ref 26–34)
MCHC RBC AUTO-ENTMCNC: 31.6 G/DL (ref 32–36)
MCV RBC AUTO: 85 FL (ref 80–100)
NON HDL CHOLESTEROL: 83 MG/DL (ref 0–149)
NRBC BLD-RTO: 0 /100 WBCS (ref 0–0)
PLATELET # BLD AUTO: 231 X10*3/UL (ref 150–450)
POTASSIUM SERPL-SCNC: 4.4 MMOL/L (ref 3.5–5.3)
PROT SERPL-MCNC: 7.2 G/DL (ref 6.4–8.2)
RBC # BLD AUTO: 4.65 X10*6/UL (ref 4.5–5.9)
SODIUM SERPL-SCNC: 144 MMOL/L (ref 136–145)
TRIGL SERPL-MCNC: 61 MG/DL (ref 0–149)
TSH SERPL-ACNC: 0.76 MIU/L (ref 0.44–3.98)
VLDL: 12 MG/DL (ref 0–40)
WBC # BLD AUTO: 4.8 X10*3/UL (ref 4.4–11.3)

## 2024-04-24 NOTE — TELEPHONE ENCOUNTER
Result Communication    Resulted Orders   CBC   Result Value Ref Range    WBC 4.8 4.4 - 11.3 x10*3/uL    nRBC 0.0 0.0 - 0.0 /100 WBCs    RBC 4.65 4.50 - 5.90 x10*6/uL    Hemoglobin 12.5 (L) 13.5 - 17.5 g/dL    Hematocrit 39.6 (L) 41.0 - 52.0 %    MCV 85 80 - 100 fL    MCH 26.9 26.0 - 34.0 pg    MCHC 31.6 (L) 32.0 - 36.0 g/dL    RDW 13.4 11.5 - 14.5 %    Platelets 231 150 - 450 x10*3/uL   Lipid Panel   Result Value Ref Range    Cholesterol 156 0 - 199 mg/dL      Comment:            Age      Desirable   Borderline High   High     0-19 Y     0 - 169       170 - 199     >/= 200    20-24 Y     0 - 189       190 - 224     >/= 225         >24 Y     0 - 199       200 - 239     >/= 240   **All ranges are based on fasting samples. Specific   therapeutic targets will vary based on patient-specific   cardiac risk.    Pediatric guidelines reference:Pediatrics 2011, 128(S5).Adult guidelines reference: NCEP ATPIII Guidelines,FAUSTINA 2001, 258:2486-97    Venipuncture immediately after or during the administration of Metamizole may lead to falsely low results. Testing should be performed immediately prior to Metamizole dosing.    HDL-Cholesterol 73.0 mg/dL      Comment:        Age       Very Low   Low     Normal    High    0-19 Y    < 35      < 40     40-45     ----  20-24 Y    ----     < 40      >45      ----        >24 Y      ----     < 40     40-60      >60      Cholesterol/HDL Ratio 2.1       Comment:        Ref Values  Desirable  < 3.4  High Risk  > 5.0    LDL Calculated 71 <=99 mg/dL      Comment:                                  Near   Borderline      AGE      Desirable  Optimal    High     High     Very High     0-19 Y     0 - 109     ---    110-129   >/= 130     ----    20-24 Y     0 - 119     ---    120-159   >/= 160     ----      >24 Y     0 -  99   100-129  130-159   160-189     >/=190      VLDL 12 0 - 40 mg/dL    Triglycerides 61 0 - 149 mg/dL      Comment:         Age         Desirable   Borderline High   High     Very  High   0 D-90 D    19 - 174         ----         ----        ----  91 D- 9 Y     0 -  74        75 -  99     >/= 100      ----    10-19 Y     0 -  89        90 - 129     >/= 130      ----    20-24 Y     0 - 114       115 - 149     >/= 150      ----         >24 Y     0 - 149       150 - 199    200- 499    >/= 500    Venipuncture immediately after or during the administration of Metamizole may lead to falsely low results. Testing should be performed immediately prior to Metamizole dosing.    Non HDL Cholesterol 83 0 - 149 mg/dL      Comment:            Age       Desirable   Borderline High   High     Very High     0-19 Y     0 - 119       120 - 144     >/= 145    >/= 160    20-24 Y     0 - 149       150 - 189     >/= 190      ----         >24 Y    30 mg/dL above LDL Cholesterol goal     Comprehensive Metabolic Panel   Result Value Ref Range    Glucose 87 74 - 99 mg/dL    Sodium 144 136 - 145 mmol/L    Potassium 4.4 3.5 - 5.3 mmol/L    Chloride 105 98 - 107 mmol/L    Bicarbonate 29 21 - 32 mmol/L    Anion Gap 14 10 - 20 mmol/L    Urea Nitrogen 13 6 - 23 mg/dL    Creatinine 0.84 0.50 - 1.30 mg/dL    eGFR >90 >60 mL/min/1.73m*2      Comment:      Calculations of estimated GFR are performed using the 2021 CKD-EPI Study Refit equation without the race variable for the IDMS-Traceable creatinine methods.  https://jasn.asnjournals.org/content/early/2021/09/22/ASN.6450080101    Calcium 9.7 8.6 - 10.6 mg/dL    Albumin 4.2 3.4 - 5.0 g/dL    Alkaline Phosphatase 45 33 - 120 U/L    Total Protein 7.2 6.4 - 8.2 g/dL    AST 22 9 - 39 U/L    Bilirubin, Total 0.4 0.0 - 1.2 mg/dL    ALT 22 10 - 52 U/L      Comment:      Patients treated with Sulfasalazine may generate falsely decreased results for ALT.   TSH with reflex to Free T4 if abnormal   Result Value Ref Range    Thyroid Stimulating Hormone 0.76 0.44 - 3.98 mIU/L    Narrative    TSH testing is performed using different testing methodology at [facility] than at other  [facility]. Direct result comparisons should only be made within the same method.     Vitamin D 25-Hydroxy,Total (for eval of Vitamin D levels)   Result Value Ref Range    Vitamin D, 25-Hydroxy, Total 34 30 - 100 ng/mL    Narrative    Deficiency:         < 20   ng/ml  Insufficiency:      20-29  ng/ml  Sufficiency:         ng/ml  This assay accurately quantifies the sum of Vitamin D3, 25-Hydroxy and Vitamin D2,25-Hydroxy.   Hemoglobin A1C   Result Value Ref Range    Hemoglobin A1C 5.7 (H) see below %      Comment:      Hemoglobin variant detected which does not interfere with determination of Hemoglobin A1c. Hemoglobin identification can be ordered to characterize the variant if clinically indicated.    Estimated Average Glucose 117 Not Established mg/dL    Narrative    Diagnosis of Diabetes-Adults  Non-Diabetic: < or = 5.6%  Increased risk for developing diabetes: 5.7-6.4%  Diagnostic of diabetes: > or = 6.5%    Monitoring of Diabetes  Age (y)....................... Therapeutic Goal (%)  Adults: >18.........................<7.0  Pediatrics: 13-18...................<7.5  Pediatrics: 7-12....................<8.0  Pediatrics: 0-6..................... 7.5-8.5    American Diabetes Association. Diabetes Care 33(S1), Jan 2010       Urinalysis with Reflex Microscopic   Result Value Ref Range    Color, Urine Light-Yellow Light-Yellow, Yellow, Dark-Yellow    Appearance, Urine Clear Clear    Specific Gravity, Urine 1.018 1.005 - 1.035    pH, Urine 6.5 5.0, 5.5, 6.0, 6.5, 7.0, 7.5, 8.0    Protein, Urine NEGATIVE NEGATIVE, 10 (TRACE), 20 (TRACE) mg/dL    Glucose, Urine Normal Normal mg/dL    Blood, Urine NEGATIVE NEGATIVE    Ketones, Urine NEGATIVE NEGATIVE mg/dL    Bilirubin, Urine NEGATIVE NEGATIVE    Urobilinogen, Urine Normal Normal mg/dL    Nitrite, Urine NEGATIVE NEGATIVE    Leukocyte Esterase, Urine NEGATIVE NEGATIVE       11:38 AM      Results were not successfully communicated with the patient and they did  not acknowledge their understanding.

## 2024-04-24 NOTE — TELEPHONE ENCOUNTER
----- Message from Christopher D'Amico, DO sent at 4/24/2024  8:51 AM EDT -----  Hemoglobin A1c of 5.7, prediabetic range, continue to improve diet, reduce junk food as discussed in office.    Borderline anemia, stable over the past several labs.    Remaining labs unremarkable.

## 2024-04-24 NOTE — RESULT ENCOUNTER NOTE
Hemoglobin A1c of 5.7, prediabetic range, continue to improve diet, reduce junk food as discussed in office.    Borderline anemia, stable over the past several labs.    Remaining labs unremarkable.

## 2024-05-07 ENCOUNTER — PROCEDURE VISIT (OUTPATIENT)
Dept: BEHAVIORAL HEALTH | Facility: CLINIC | Age: 34
End: 2024-05-07
Payer: MEDICAID

## 2024-05-07 VITALS
WEIGHT: 155.3 LBS | HEART RATE: 69 BPM | BODY MASS INDEX: 32.46 KG/M2 | TEMPERATURE: 98 F | SYSTOLIC BLOOD PRESSURE: 120 MMHG | DIASTOLIC BLOOD PRESSURE: 70 MMHG | RESPIRATION RATE: 16 BRPM

## 2024-05-07 DIAGNOSIS — F06.31 MOOD DISORDER WITH DEPRESSIVE FEATURES DUE TO GENERAL MEDICAL CONDITION: Primary | ICD-10-CM

## 2024-05-07 ASSESSMENT — PAIN SCALES - GENERAL: PAINLEVEL: 0-NO PAIN

## 2024-05-07 NOTE — PROGRESS NOTES
Patient here at the clinic today to receive his monthly Abilify Maintena for Mood disorder with depressive features      Patient identified by full name and  and vitals obtained. patient last seen by provider 2/15/24, last seen by nurse on 24. Medication verified by providers note and medication order.        Appetite: no change  Sleep: No change  Appearance: clean, age appropriate, well-groomed  Build: average  Attitude: cooperative, calm, pleasant, friendly, open  Eye Contact: normal  Activity: alert, attentive, appropriate  Speech: appropriate & spontaneous, normal  Delusions: patient denies   Thought Content: logical  Thought Process: logical  Judgement/insight: Fair  Mood: calm happy  Affect: appropriate  AH/VH/SI/HI: patient denies  Access to firearms/weapons: No  Depression: patient denies  Thoughts of hopelessness: Patient denies  Anxiety: patient denies  Self-injurious behavior: patient denies  Cravings/Urges: NA  Last use: NA  Tobacco Use: non-smoker  Spiritual or cultural practices that may affect your care or impact your health care decisions: no  Living situation lives with mom whom is his guardian   Employed: No        Patient here at the clinic today to receive his monthly Abilify Maintena for Mood disorder accompanied by his mother. Patient was cooperative and engaged during this visit. Per mom there has been no issues with previous injection and denies any issues with SI/HI, VH/AH, depression and no recent hospitalizations      Patient received injection of Abilify Maintena 400mg/2ml via 23 gauge w/o incident into left deltoid area. Patient tolerated injection well, no reaction at injection site.      LOT # [id number]  EXP:  2026  NDC: 08275-381-56        Patient will RTC in 4 weeks    LUCIANA Siddiqui

## 2024-05-22 ENCOUNTER — APPOINTMENT (OUTPATIENT)
Dept: BEHAVIORAL HEALTH | Facility: CLINIC | Age: 34
End: 2024-05-22
Payer: MEDICAID

## 2024-06-18 ENCOUNTER — PROCEDURE VISIT (OUTPATIENT)
Dept: BEHAVIORAL HEALTH | Facility: CLINIC | Age: 34
End: 2024-06-18
Payer: MEDICAID

## 2024-06-18 VITALS
HEART RATE: 67 BPM | RESPIRATION RATE: 16 BRPM | TEMPERATURE: 97.7 F | DIASTOLIC BLOOD PRESSURE: 69 MMHG | SYSTOLIC BLOOD PRESSURE: 102 MMHG

## 2024-06-18 DIAGNOSIS — F06.31 MOOD DISORDER WITH DEPRESSIVE FEATURES DUE TO GENERAL MEDICAL CONDITION: Primary | ICD-10-CM

## 2024-06-18 ASSESSMENT — PAIN SCALES - GENERAL: PAINLEVEL: 0-NO PAIN

## 2024-06-18 NOTE — PROGRESS NOTES
Patient here at the clinic today to receive his monthly Abilify Maintena for Mood disorder with depressive features      Patient identified by full name and  and vitals obtained. patient last seen by provider 24, last seen by nurse on 24 Medication verified by providers note and medication order.        Appetite: no change  Sleep: No change  Appearance: clean, age appropriate, well-groomed  Build: average  Attitude: cooperative, calm, pleasant, friendly, open  Eye Contact: normal  Activity: alert, attentive, appropriate  Speech: appropriate & spontaneous, normal  Delusions: patient denies   Thought Content: logical  Thought Process: logical  Judgement/insight: Fair  Mood: calm happy  Affect: appropriate  AH/VH/SI/HI: patient denies  Access to firearms/weapons: No  Depression: patient denies  Thoughts of hopelessness: Patient denies  Anxiety: patient denies  Self-injurious behavior: patient denies  Cravings/Urges: NA  Last use: NA  Tobacco Use: non-smoker  Spiritual or cultural practices that may affect your care or impact your health care decisions: no  Living situation lives with mom whom is his guardian   Employed: No        Patient here at the clinic today to receive his monthly Abilify Maintena for Mood disorder accompanied by his mother. Patient was cooperative and engaged during this visit. Per mom there has been no issues with previous injection and denies any issues with SI/HI, VH/AH, depression and no recent hospitalizations      Patient received injection of Abilify Maintena 400mg/2ml via 23 gauge w/o incident into right deltoid area. Patient tolerated injection well, no reaction at injection site.      LOT # [id number]  EXP:  2026  NDC: 69743-014-88        Patient will RTC in 4 weeks 24    LUCIANA Siddiqui

## 2024-06-21 DIAGNOSIS — F43.10 PTSD (POST-TRAUMATIC STRESS DISORDER): ICD-10-CM

## 2024-06-21 DIAGNOSIS — F06.31 MOOD DISORDER WITH DEPRESSIVE FEATURES DUE TO GENERAL MEDICAL CONDITION: ICD-10-CM

## 2024-06-21 RX ORDER — SERTRALINE HYDROCHLORIDE 100 MG/1
200 TABLET, FILM COATED ORAL DAILY
Qty: 60 TABLET | Refills: 3 | Status: SHIPPED | OUTPATIENT
Start: 2024-06-21 | End: 2024-10-19

## 2024-06-21 RX ORDER — BUSPIRONE HYDROCHLORIDE 5 MG/1
5 TABLET ORAL NIGHTLY
Qty: 30 TABLET | Refills: 3 | Status: SHIPPED | OUTPATIENT
Start: 2024-06-21 | End: 2024-10-19

## 2024-06-21 RX ORDER — ARIPIPRAZOLE 400 MG
400 KIT INTRAMUSCULAR
Qty: 1 EACH | Refills: 1 | Status: SHIPPED | OUTPATIENT
Start: 2024-06-21

## 2024-06-21 RX ORDER — SERTRALINE HYDROCHLORIDE 50 MG/1
50 TABLET, FILM COATED ORAL DAILY
Qty: 30 TABLET | Refills: 3 | Status: SHIPPED | OUTPATIENT
Start: 2024-06-21 | End: 2024-10-19

## 2024-06-26 ENCOUNTER — TELEPHONE (OUTPATIENT)
Dept: OTHER | Age: 34
End: 2024-06-26
Payer: MEDICAID

## 2024-06-26 DIAGNOSIS — F90.2 ATTENTION DEFICIT HYPERACTIVITY DISORDER (ADHD), COMBINED TYPE: ICD-10-CM

## 2024-06-26 RX ORDER — DEXTROAMPHETAMINE SULFATE, DEXTROAMPHETAMINE SACCHARATE, AMPHETAMINE SULFATE AND AMPHETAMINE ASPARTATE 7.5; 7.5; 7.5; 7.5 MG/1; MG/1; MG/1; MG/1
30 CAPSULE, EXTENDED RELEASE ORAL EVERY MORNING
Qty: 30 CAPSULE | Refills: 0 | Status: SHIPPED | OUTPATIENT
Start: 2024-06-26 | End: 2024-07-26

## 2024-07-02 ENCOUNTER — APPOINTMENT (OUTPATIENT)
Dept: BEHAVIORAL HEALTH | Facility: CLINIC | Age: 34
End: 2024-07-02
Payer: MEDICAID

## 2024-07-03 ENCOUNTER — APPOINTMENT (OUTPATIENT)
Dept: BEHAVIORAL HEALTH | Facility: CLINIC | Age: 34
End: 2024-07-03
Payer: MEDICAID

## 2024-07-03 VITALS
BODY MASS INDEX: 31.1 KG/M2 | RESPIRATION RATE: 14 BRPM | TEMPERATURE: 97.8 F | HEART RATE: 88 BPM | DIASTOLIC BLOOD PRESSURE: 77 MMHG | SYSTOLIC BLOOD PRESSURE: 119 MMHG | WEIGHT: 148.8 LBS

## 2024-07-03 DIAGNOSIS — F90.2 ATTENTION DEFICIT HYPERACTIVITY DISORDER (ADHD), COMBINED TYPE: Primary | ICD-10-CM

## 2024-07-03 DIAGNOSIS — Q87.11 PRADER-WILLI SYNDROME (HHS-HCC): ICD-10-CM

## 2024-07-03 DIAGNOSIS — F06.31 MOOD DISORDER WITH DEPRESSIVE FEATURES DUE TO GENERAL MEDICAL CONDITION: ICD-10-CM

## 2024-07-03 DIAGNOSIS — F43.10 PTSD (POST-TRAUMATIC STRESS DISORDER): ICD-10-CM

## 2024-07-03 PROCEDURE — 99215 OFFICE O/P EST HI 40 MIN: CPT | Performed by: NURSE PRACTITIONER

## 2024-07-03 RX ORDER — BUSPIRONE HYDROCHLORIDE 5 MG/1
5 TABLET ORAL NIGHTLY
Qty: 30 TABLET | Refills: 3 | Status: SHIPPED | OUTPATIENT
Start: 2024-07-03 | End: 2024-07-03 | Stop reason: SINTOL

## 2024-07-03 RX ORDER — SERTRALINE HYDROCHLORIDE 100 MG/1
200 TABLET, FILM COATED ORAL DAILY
Qty: 60 TABLET | Refills: 3 | Status: SHIPPED | OUTPATIENT
Start: 2024-07-03 | End: 2024-07-03 | Stop reason: SDUPTHER

## 2024-07-03 RX ORDER — DEXTROAMPHETAMINE SULFATE, DEXTROAMPHETAMINE SACCHARATE, AMPHETAMINE SULFATE AND AMPHETAMINE ASPARTATE 7.5; 7.5; 7.5; 7.5 MG/1; MG/1; MG/1; MG/1
30 CAPSULE, EXTENDED RELEASE ORAL EVERY MORNING
Qty: 30 CAPSULE | Refills: 0 | Status: SHIPPED | OUTPATIENT
Start: 2024-09-01 | End: 2024-10-01

## 2024-07-03 RX ORDER — DEXTROAMPHETAMINE SULFATE, DEXTROAMPHETAMINE SACCHARATE, AMPHETAMINE SULFATE AND AMPHETAMINE ASPARTATE 7.5; 7.5; 7.5; 7.5 MG/1; MG/1; MG/1; MG/1
30 CAPSULE, EXTENDED RELEASE ORAL EVERY MORNING
Qty: 30 CAPSULE | Refills: 0 | Status: SHIPPED | OUTPATIENT
Start: 2024-08-02 | End: 2024-09-01

## 2024-07-03 RX ORDER — DEXTROAMPHETAMINE SULFATE, DEXTROAMPHETAMINE SACCHARATE, AMPHETAMINE SULFATE AND AMPHETAMINE ASPARTATE 1.25; 1.25; 1.25; 1.25 MG/1; MG/1; MG/1; MG/1
5 CAPSULE, EXTENDED RELEASE ORAL EVERY MORNING
Qty: 30 CAPSULE | Refills: 0 | Status: SHIPPED | OUTPATIENT
Start: 2024-09-01 | End: 2024-10-01

## 2024-07-03 RX ORDER — ARIPIPRAZOLE 400 MG
400 KIT INTRAMUSCULAR
Qty: 1 EACH | Refills: 3 | Status: SHIPPED | OUTPATIENT
Start: 2024-07-03 | End: 2024-07-03 | Stop reason: SDUPTHER

## 2024-07-03 RX ORDER — ARIPIPRAZOLE 400 MG
400 KIT INTRAMUSCULAR
Qty: 1 EACH | Refills: 3 | Status: SHIPPED | OUTPATIENT
Start: 2024-07-03

## 2024-07-03 RX ORDER — PRAZOSIN HYDROCHLORIDE 2 MG/1
4 CAPSULE ORAL NIGHTLY
Qty: 60 CAPSULE | Refills: 5 | Status: SHIPPED | OUTPATIENT
Start: 2024-07-03 | End: 2024-12-30

## 2024-07-03 RX ORDER — DEXTROAMPHETAMINE SULFATE, DEXTROAMPHETAMINE SACCHARATE, AMPHETAMINE SULFATE AND AMPHETAMINE ASPARTATE 7.5; 7.5; 7.5; 7.5 MG/1; MG/1; MG/1; MG/1
30 CAPSULE, EXTENDED RELEASE ORAL EVERY MORNING
Qty: 30 CAPSULE | Refills: 0 | Status: SHIPPED | OUTPATIENT
Start: 2024-07-03 | End: 2024-08-02

## 2024-07-03 RX ORDER — SERTRALINE HYDROCHLORIDE 50 MG/1
50 TABLET, FILM COATED ORAL DAILY
Qty: 30 TABLET | Refills: 3 | Status: SHIPPED | OUTPATIENT
Start: 2024-07-03 | End: 2024-10-31

## 2024-07-03 RX ORDER — DEXTROAMPHETAMINE SULFATE, DEXTROAMPHETAMINE SACCHARATE, AMPHETAMINE SULFATE AND AMPHETAMINE ASPARTATE 1.25; 1.25; 1.25; 1.25 MG/1; MG/1; MG/1; MG/1
5 CAPSULE, EXTENDED RELEASE ORAL EVERY MORNING
Qty: 30 CAPSULE | Refills: 0 | Status: SHIPPED | OUTPATIENT
Start: 2024-08-02 | End: 2024-09-01

## 2024-07-03 RX ORDER — SERTRALINE HYDROCHLORIDE 50 MG/1
50 TABLET, FILM COATED ORAL DAILY
Qty: 30 TABLET | Refills: 3 | Status: SHIPPED | OUTPATIENT
Start: 2024-07-03 | End: 2024-07-03 | Stop reason: SDUPTHER

## 2024-07-03 RX ORDER — SERTRALINE HYDROCHLORIDE 100 MG/1
200 TABLET, FILM COATED ORAL DAILY
Qty: 60 TABLET | Refills: 3 | Status: SHIPPED | OUTPATIENT
Start: 2024-07-03 | End: 2024-10-31

## 2024-07-03 RX ORDER — DEXTROAMPHETAMINE SULFATE, DEXTROAMPHETAMINE SACCHARATE, AMPHETAMINE SULFATE AND AMPHETAMINE ASPARTATE 1.25; 1.25; 1.25; 1.25 MG/1; MG/1; MG/1; MG/1
5 CAPSULE, EXTENDED RELEASE ORAL EVERY MORNING
Qty: 30 CAPSULE | Refills: 0 | Status: SHIPPED | OUTPATIENT
Start: 2024-07-03 | End: 2024-08-02

## 2024-07-03 ASSESSMENT — ABNORMAL INVOLUNTARY MOVEMENT SCALE (AIMS)
NECK_SHOULDER_HIPS: NONE, NORMAL
TONGUE: NONE, NORMAL
LIPS_PARIETAL: NONE, NORMAL
UPPER_BODY_EXTREMITIES: NONE, NORMAL
AIMS_PATIENT_INCAPACITATION: NONE, NORMAL
FACIAL_EXPRESSION_MUSCLES: NONE, NORMAL
JAW: NONE, NORMAL
LOWER_BODY_EXTREMITIES: NONE, NORMAL
AIMS_PATIENT_AWARENESS: NO AWARENESS
AIMS_SEVERITY: 0

## 2024-07-03 ASSESSMENT — PAIN SCALES - GENERAL: PAINLEVEL: 0-NO PAIN

## 2024-07-03 NOTE — PATIENT INSTRUCTIONS
Patient Discussion/Summary  ASSESSMENT: Mr. Andrews presents with an increase in impulse control behaviors.  Buspar caused nighttime incontinence.    See treatment plan below.    PLAN:           problems treated   f/u requested to prevent relapse   medications renewed/re-ordered    1. Discontinue BuSpar 5 mg by mouth at bedtime for PTSD  Start Adderall XR 5 mg by mouth at noon as booster for ADHD. Not able to get name brand in IR. Therefore, XR ordered.  2. Continue Prazosin 4 mg by mouth at bedtime for nightmares related to PTSD.   3. Continue sertraline (Zoloft) 250 mg by mouth daily for PTSD and skin excoriation  4. Continue Adderall extended release 30 mg by mouth daily for ADHD- must be name brand. I have personally reviewed the OARRS report 7/3/24. I have considered the risks of abuse, dependence, addiction and diversion.  5. Continue Abilify Maintena 400 mg IM Q 4 weeks for moods   6. Risks, benefits, alternatives, off-label uses, and side effects of medications have been discussed with patient/caregiver. There is no report of signs/symptoms consistent with medication-induced impairment in daily functioning. At this time, benefits of medication felt to outweigh potential risks. Will continue to reassess need for psychotropic medication at regular 3 month intervals.  7. Return to clinic 3 months for an in-person appointment or earlier if needed. Call [phone] to reschedule.  8. Mom will obtain pharmacogenomics testing (PGT) results from previous MH & scan to [email or web address] or bring to office     Thank you for seeing me today. If you have any questions, do not hesitate to contact my office.  Gwendolyn Anthony

## 2024-07-03 NOTE — PROGRESS NOTES
Patient Discussion/Summary  ASSESSMENT: Mr. Andrews presents with an increase in impulse control behaviors.  Buspar caused nighttime incontinence.    See treatment plan below.    PLAN:           problems treated   f/u requested to prevent relapse   medications renewed/re-ordered    1. Discontinue BuSpar 5 mg by mouth at bedtime for PTSD  Start Adderall XR 5 mg by mouth at noon as booster for ADHD. Not able to get name brand in IR. Therefore, XR ordered.  2. Continue Prazosin 4 mg by mouth at bedtime for nightmares related to PTSD.   3. Continue sertraline (Zoloft) 250 mg by mouth daily for PTSD and skin excoriation  4. Continue Adderall extended release 30 mg by mouth daily for ADHD- must be name brand. I have personally reviewed the OARRS report 7/3/24. I have considered the risks of abuse, dependence, addiction and diversion.  Stim agreement signed 7/3/2024  5. Continue Abilify Maintena 400 mg IM Q 4 weeks for moods   6. Risks, benefits, alternatives, off-label uses, and side effects of medications have been discussed with patient/caregiver. There is no report of signs/symptoms consistent with medication-induced impairment in daily functioning. At this time, benefits of medication felt to outweigh potential risks. Will continue to reassess need for psychotropic medication at regular 3 month intervals.  7. Return to clinic 3 months for an in-person appointment or earlier if needed. Call [phone] to reschedule.  8. Mom will obtain pharmacogenomics testing (PGT) results from previous MH & scan to [email or web address] or bring to office     Thank you for seeing me today. If you have any questions, do not hesitate to contact my office.  Emilee Anthony
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
## 2024-07-05 ENCOUNTER — HOSPITAL ENCOUNTER (EMERGENCY)
Facility: HOSPITAL | Age: 34
Discharge: HOME | End: 2024-07-05
Payer: MEDICAID

## 2024-07-05 VITALS
DIASTOLIC BLOOD PRESSURE: 71 MMHG | HEIGHT: 60 IN | TEMPERATURE: 98.2 F | WEIGHT: 148 LBS | SYSTOLIC BLOOD PRESSURE: 134 MMHG | OXYGEN SATURATION: 100 % | HEART RATE: 63 BPM | BODY MASS INDEX: 29.06 KG/M2 | RESPIRATION RATE: 18 BRPM

## 2024-07-05 DIAGNOSIS — B02.9 HERPES ZOSTER WITHOUT COMPLICATION: Primary | ICD-10-CM

## 2024-07-05 PROCEDURE — 2500000002 HC RX 250 W HCPCS SELF ADMINISTERED DRUGS (ALT 637 FOR MEDICARE OP, ALT 636 FOR OP/ED): Performed by: NURSE PRACTITIONER

## 2024-07-05 PROCEDURE — 99283 EMERGENCY DEPT VISIT LOW MDM: CPT

## 2024-07-05 PROCEDURE — 2500000001 HC RX 250 WO HCPCS SELF ADMINISTERED DRUGS (ALT 637 FOR MEDICARE OP): Performed by: NURSE PRACTITIONER

## 2024-07-05 RX ORDER — VALACYCLOVIR HYDROCHLORIDE 1 G/1
1000 TABLET, FILM COATED ORAL 3 TIMES DAILY
Qty: 21 TABLET | Refills: 0 | Status: SHIPPED | OUTPATIENT
Start: 2024-07-05 | End: 2024-07-12

## 2024-07-05 RX ORDER — OXYCODONE AND ACETAMINOPHEN 5; 325 MG/1; MG/1
1 TABLET ORAL ONCE
Status: COMPLETED | OUTPATIENT
Start: 2024-07-05 | End: 2024-07-05

## 2024-07-05 RX ORDER — OXYCODONE AND ACETAMINOPHEN 5; 325 MG/1; MG/1
1 TABLET ORAL EVERY 6 HOURS PRN
Qty: 5 TABLET | Refills: 0 | Status: SHIPPED | OUTPATIENT
Start: 2024-07-05 | End: 2024-07-08

## 2024-07-05 RX ORDER — VALACYCLOVIR HYDROCHLORIDE 500 MG/1
1000 TABLET, FILM COATED ORAL ONCE
Status: COMPLETED | OUTPATIENT
Start: 2024-07-05 | End: 2024-07-05

## 2024-07-05 ASSESSMENT — PAIN SCALES - GENERAL: PAINLEVEL_OUTOF10: 10 - WORST POSSIBLE PAIN

## 2024-07-05 ASSESSMENT — PAIN DESCRIPTION - PAIN TYPE: TYPE: ACUTE PAIN

## 2024-07-05 ASSESSMENT — PAIN DESCRIPTION - LOCATION: LOCATION: BACK

## 2024-07-05 ASSESSMENT — COLUMBIA-SUICIDE SEVERITY RATING SCALE - C-SSRS
6. HAVE YOU EVER DONE ANYTHING, STARTED TO DO ANYTHING, OR PREPARED TO DO ANYTHING TO END YOUR LIFE?: NO
1. IN THE PAST MONTH, HAVE YOU WISHED YOU WERE DEAD OR WISHED YOU COULD GO TO SLEEP AND NOT WAKE UP?: NO
2. HAVE YOU ACTUALLY HAD ANY THOUGHTS OF KILLING YOURSELF?: NO

## 2024-07-05 ASSESSMENT — PAIN - FUNCTIONAL ASSESSMENT: PAIN_FUNCTIONAL_ASSESSMENT: 0-10

## 2024-07-05 NOTE — Clinical Note
Lonny Andrews was seen and treated in our emergency department on 7/5/2024.  He may return to work on 07/10/2024.       If you have any questions or concerns, please don't hesitate to call.      Lonny Littlejohn, DANIELE-CNP

## 2024-07-05 NOTE — ED PROVIDER NOTES
HPI   Chief Complaint   Patient presents with    Rash       34-year-old male with a history of Prader-Willi syndrome presents today with a rash on the right side of his back following dermatome.  He denies fever, headache, chest pain, dyspnea, abdominal pain, nausea or vomiting.  He rates his pain 10 out of 10 and mother indicates when every complains of pain she brings him immediately to the emergency department because he typically never endorses pain.  All vital signs are normal and stable in triage.  He has multiple allergies to medication including clonazepam, dextra morphine and amphetamine, and ziprasidone      History provided by:  Patient and parent   used: No                        No data recorded                   Patient History   Past Medical History:   Diagnosis Date    Decreased white blood cell count, unspecified 09/20/2021    Leukopenia    Encounter for immunization 07/19/2016    Diphtheria-tetanus-pertussis (DTP) vaccination    Personal history of other infectious and parasitic diseases 09/29/2020    History of herpes simplex infection     No past surgical history on file.  Family History   Problem Relation Name Age of Onset    Asthma Mother      Hyperlipidemia Mother      Hypertension Mother      Migraines Mother      Asthma Father      Hypertension Father      Diabetes Father      Asthma Sister      Anxiety disorder Sister      Depression Sister      Migraines Sister      Diabetes Other       Social History     Tobacco Use    Smoking status: Never    Smokeless tobacco: Never   Substance Use Topics    Alcohol use: Never    Drug use: Never       Physical Exam   ED Triage Vitals [07/05/24 1801]   Temperature Heart Rate Respirations BP   36.8 °C (98.2 °F) 63 18 134/71      Pulse Ox Temp src Heart Rate Source Patient Position   100 % -- Monitor Sitting      BP Location FiO2 (%)     Left arm --       Physical Exam  Constitutional:       Appearance: Normal appearance.   HENT:       Head: Normocephalic and atraumatic.      Right Ear: Tympanic membrane normal.      Left Ear: Tympanic membrane normal.      Nose: Nose normal.      Mouth/Throat:      Mouth: Mucous membranes are moist.   Eyes:      Extraocular Movements: Extraocular movements intact.      Pupils: Pupils are equal, round, and reactive to light.   Cardiovascular:      Rate and Rhythm: Normal rate and regular rhythm.      Pulses: Normal pulses.      Heart sounds: Normal heart sounds.   Pulmonary:      Effort: Pulmonary effort is normal.      Breath sounds: Normal breath sounds.   Abdominal:      General: Abdomen is flat.      Palpations: Abdomen is soft.   Musculoskeletal:         General: Normal range of motion.      Cervical back: Normal range of motion.   Skin:     General: Skin is warm.      Capillary Refill: Capillary refill takes less than 2 seconds.      Findings: Rash present.      Comments: Rash that follows only the dermatome on the right side of his back.   Neurological:      Mental Status: He is alert. Mental status is at baseline.   Psychiatric:         Mood and Affect: Mood normal.         Behavior: Behavior normal.         ED Course & MDM   Diagnoses as of 07/05/24 1828   Herpes zoster without complication       Medical Decision Making  Oropharynx was normal in presentation and there was little concern for strep.  He was afebrile.  There was no cervical tenderness.  Remaining physical exam was normal.  I believe patient is experiencing shingles.  He was placed on valacyclovir 3 times daily for 7 days per protocol.  He can use Percocet for his 10 out of 10 pain and received 1 Percocet and his first dose of valacyclovir in our emergency department.  I requested a dermatology appointment for follow-up and they can also follow-up with PCP.  I completed a work note to give patient off until Wednesday.  Safely discharged home with return precautions.        Procedure  Procedures     Lonny Littlejohn, DANIELE-CNP  07/05/24  1828

## 2024-07-10 ENCOUNTER — TELEPHONE (OUTPATIENT)
Dept: BEHAVIORAL HEALTH | Facility: CLINIC | Age: 34
End: 2024-07-10
Payer: MEDICAID

## 2024-07-16 ENCOUNTER — APPOINTMENT (OUTPATIENT)
Dept: BEHAVIORAL HEALTH | Facility: CLINIC | Age: 34
End: 2024-07-16
Payer: MEDICAID

## 2024-07-16 VITALS
BODY MASS INDEX: 31.33 KG/M2 | RESPIRATION RATE: 16 BRPM | WEIGHT: 149.9 LBS | HEART RATE: 58 BPM | TEMPERATURE: 98 F | SYSTOLIC BLOOD PRESSURE: 99 MMHG | DIASTOLIC BLOOD PRESSURE: 64 MMHG

## 2024-07-16 DIAGNOSIS — F06.31 MOOD DISORDER WITH DEPRESSIVE FEATURES DUE TO GENERAL MEDICAL CONDITION: Primary | ICD-10-CM

## 2024-07-16 NOTE — PROGRESS NOTES
Patient here at the clinic accompanied by mom to receive his monthly Abilify Maintena for Mood disorder with depressive features      Patient identified by full name and  and vitals obtained. patient last seen by provider 7/3/24, last seen by nurse on 24 Medication verified by providers note and medication order.        Appetite: no change  Sleep: No change  Appearance: clean, age appropriate, well-groomed  Build: average  Attitude: cooperative, calm, pleasant, friendly, open  Eye Contact: normal  Activity: alert, attentive, appropriate  Speech: appropriate & spontaneous, normal  Delusions: patient denies   Thought Content: logical  Thought Process: logical  Judgement/insight: Fair  Mood: calm happy  Affect: appropriate  AH/VH/SI/HI: patient denies  Access to firearms/weapons: No  Depression: patient denies  Thoughts of hopelessness: Patient denies  Anxiety: patient denies  Self-injurious behavior: patient denies  Cravings/Urges: NA  Last use: NA  Tobacco Use: non-smoker  Spiritual or cultural practices that may affect your care or impact your health care decisions: no  Living situation lives with mom whom is his guardian   Employed: No         Patient was cooperative and engaged during this visit. Per mom there has been no issues with previous injection and denies any issues with SI/HI, VH/AH, depression and no recent hospitalizations      Patient received injection of Abilify Maintena 400mg/2ml via 23 gauge w/o incident into left deltoid area. Patient tolerated injection well, no reaction at injection site.      LOT # [id number]  EXP:  2026  NDC: 72763-430-37        Patient will RTC in 4 weeks    Lakshmi ThompsonRN, BSN

## 2024-08-05 ENCOUNTER — TELEPHONE (OUTPATIENT)
Dept: OTHER | Age: 34
End: 2024-08-05
Payer: MEDICAID

## 2024-08-05 ENCOUNTER — TELEPHONE (OUTPATIENT)
Dept: BEHAVIORAL HEALTH | Facility: CLINIC | Age: 34
End: 2024-08-05
Payer: MEDICAID

## 2024-08-05 NOTE — PROGRESS NOTES
PROVIDER: [facility]   MEDICATION/DOSAGE: adderall XR 5mg  QTY/SUPPLY: 30/30  PRIOR AUTH COMPLETED VIA: cover my meds  INSURANCE:   REF #/KEY: [id number]  STATUS approved  Bin: [id number]  D# [id number]

## 2024-08-13 ENCOUNTER — APPOINTMENT (OUTPATIENT)
Dept: BEHAVIORAL HEALTH | Facility: CLINIC | Age: 34
End: 2024-08-13
Payer: MEDICAID

## 2024-08-13 VITALS
RESPIRATION RATE: 16 BRPM | HEART RATE: 63 BPM | DIASTOLIC BLOOD PRESSURE: 79 MMHG | TEMPERATURE: 97.8 F | SYSTOLIC BLOOD PRESSURE: 124 MMHG

## 2024-08-13 DIAGNOSIS — F06.31 MOOD DISORDER WITH DEPRESSIVE FEATURES DUE TO GENERAL MEDICAL CONDITION: Primary | ICD-10-CM

## 2024-08-13 NOTE — PROGRESS NOTES
Patient here at the clinic today to receive his monthly Abilify Maintena for Mood disorder with depressive features      Patient identified by full name and  and vitals obtained. patient last seen by provider 7/3/24, last seen by nurse on 24 Medication verified by providers note and medication order.        Appetite: no change  Sleep: No change  Appearance: clean, age appropriate, well-groomed  Build: average  Attitude: cooperative, calm, pleasant, friendly, open  Eye Contact: normal  Activity: alert, attentive, appropriate  Speech: appropriate & spontaneous, normal  Delusions: patient denies   Thought Content: logical  Thought Process: logical  Judgement/insight: Fair  Mood: calm happy  Affect: appropriate  AH/VH/SI/HI: patient denies  Access to firearms/weapons: No  Depression: patient denies  Thoughts of hopelessness: Patient denies  Anxiety: patient denies  Self-injurious behavior: patient denies  Cravings/Urges: NA  Last use: NA  Tobacco Use: non-smoker  Spiritual or cultural practices that may affect your care or impact your health care decisions: no  Living situation lives with mom whom is his guardian   Employed: No        Patient here at the clinic today to receive his monthly Abilify Maintena for Mood disorder accompanied by his mother. Patient was cooperative and engaged during this visit. Per mom there has been no issues with previous injection and denies any issues with SI/HI, VH/AH, depression and no recent hospitalizations      Patient received injection of Abilify Maintena 400mg/2ml via 23 gauge w/o incident into right deltoid area. Patient tolerated injection well, no reaction at injection site.      LOT # [id number]  EXP:  OCT 2026  NDC: 07134-307-24        Patient will RTC in 4 weeks 9/10/24    LUCIANA Siddiqui

## 2024-09-06 ENCOUNTER — APPOINTMENT (OUTPATIENT)
Dept: ENDOCRINOLOGY | Facility: CLINIC | Age: 34
End: 2024-09-06
Payer: MEDICAID

## 2024-09-06 ENCOUNTER — LAB (OUTPATIENT)
Dept: LAB | Facility: LAB | Age: 34
End: 2024-09-06
Payer: MEDICAID

## 2024-09-06 VITALS
BODY MASS INDEX: 30.82 KG/M2 | HEART RATE: 60 BPM | WEIGHT: 157 LBS | SYSTOLIC BLOOD PRESSURE: 115 MMHG | HEIGHT: 60 IN | DIASTOLIC BLOOD PRESSURE: 74 MMHG

## 2024-09-06 DIAGNOSIS — E29.1 HYPOGONADISM IN MALE: Primary | ICD-10-CM

## 2024-09-06 DIAGNOSIS — E55.9 VITAMIN D DEFICIENCY: ICD-10-CM

## 2024-09-06 DIAGNOSIS — E29.1 HYPOGONADISM IN MALE: ICD-10-CM

## 2024-09-06 DIAGNOSIS — B00.9 RECURRENT HSV (HERPES SIMPLEX VIRUS): ICD-10-CM

## 2024-09-06 DIAGNOSIS — R73.9 HYPERGLYCEMIA: ICD-10-CM

## 2024-09-06 DIAGNOSIS — E78.00 HYPERCHOLESTEROLEMIA: ICD-10-CM

## 2024-09-06 DIAGNOSIS — Q87.11 PRADER-WILLI SYNDROME (HHS-HCC): ICD-10-CM

## 2024-09-06 DIAGNOSIS — R73.03 PREDIABETES: ICD-10-CM

## 2024-09-06 DIAGNOSIS — Z00.00 WELLNESS EXAMINATION: ICD-10-CM

## 2024-09-06 DIAGNOSIS — D64.9 ANEMIA, UNSPECIFIED TYPE: ICD-10-CM

## 2024-09-06 LAB
25(OH)D3 SERPL-MCNC: 10 NG/ML (ref 30–100)
ALBUMIN SERPL BCP-MCNC: 4.1 G/DL (ref 3.4–5)
ALP SERPL-CCNC: 49 U/L (ref 33–120)
ALT SERPL W P-5'-P-CCNC: 10 U/L (ref 10–52)
ANION GAP SERPL CALC-SCNC: 15 MMOL/L (ref 10–20)
AST SERPL W P-5'-P-CCNC: 14 U/L (ref 9–39)
BILIRUB SERPL-MCNC: 0.3 MG/DL (ref 0–1.2)
BUN SERPL-MCNC: 27 MG/DL (ref 6–23)
CALCIUM SERPL-MCNC: 10.1 MG/DL (ref 8.6–10.6)
CHLORIDE SERPL-SCNC: 105 MMOL/L (ref 98–107)
CHOLEST SERPL-MCNC: 155 MG/DL (ref 0–199)
CHOLESTEROL/HDL RATIO: 2.9
CO2 SERPL-SCNC: 28 MMOL/L (ref 21–32)
CORTIS SERPL-MCNC: 6.9 UG/DL (ref 2.5–20)
CREAT SERPL-MCNC: 0.89 MG/DL (ref 0.5–1.3)
DHEA-S SERPL-MCNC: 319 UG/DL (ref 95–530)
EGFRCR SERPLBLD CKD-EPI 2021: >90 ML/MIN/1.73M*2
ERYTHROCYTE [DISTWIDTH] IN BLOOD BY AUTOMATED COUNT: 13.7 % (ref 11.5–14.5)
ESTRADIOL SERPL-MCNC: <19 PG/ML
FSH SERPL-ACNC: 6.8 IU/L
GLUCOSE SERPL-MCNC: 102 MG/DL (ref 74–99)
HCT VFR BLD AUTO: 41.3 % (ref 41–52)
HDLC SERPL-MCNC: 52.9 MG/DL
HGB BLD-MCNC: 12.8 G/DL (ref 13.5–17.5)
LDLC SERPL CALC-MCNC: 87 MG/DL
LH SERPL-ACNC: 1.5 IU/L
MCH RBC QN AUTO: 26.8 PG (ref 26–34)
MCHC RBC AUTO-ENTMCNC: 31 G/DL (ref 32–36)
MCV RBC AUTO: 86 FL (ref 80–100)
NON HDL CHOLESTEROL: 102 MG/DL (ref 0–149)
NRBC BLD-RTO: 0 /100 WBCS (ref 0–0)
PLATELET # BLD AUTO: 258 X10*3/UL (ref 150–450)
POTASSIUM SERPL-SCNC: 4.5 MMOL/L (ref 3.5–5.3)
PROLACTIN SERPL-MCNC: <1 UG/L (ref 2–18)
PROT SERPL-MCNC: 7.2 G/DL (ref 6.4–8.2)
PTH-INTACT SERPL-MCNC: 21.7 PG/ML (ref 18.5–88)
RBC # BLD AUTO: 4.78 X10*6/UL (ref 4.5–5.9)
SODIUM SERPL-SCNC: 143 MMOL/L (ref 136–145)
T4 FREE SERPL-MCNC: 0.97 NG/DL (ref 0.78–1.48)
TRIGL SERPL-MCNC: 77 MG/DL (ref 0–149)
TSH SERPL-ACNC: 0.79 MIU/L (ref 0.44–3.98)
VLDL: 15 MG/DL (ref 0–40)
WBC # BLD AUTO: 5.4 X10*3/UL (ref 4.4–11.3)

## 2024-09-06 PROCEDURE — 84305 ASSAY OF SOMATOMEDIN: CPT

## 2024-09-06 PROCEDURE — 85027 COMPLETE CBC AUTOMATED: CPT

## 2024-09-06 PROCEDURE — 84402 ASSAY OF FREE TESTOSTERONE: CPT

## 2024-09-06 PROCEDURE — 84439 ASSAY OF FREE THYROXINE: CPT

## 2024-09-06 PROCEDURE — 83001 ASSAY OF GONADOTROPIN (FSH): CPT

## 2024-09-06 PROCEDURE — 84146 ASSAY OF PROLACTIN: CPT

## 2024-09-06 PROCEDURE — 82670 ASSAY OF TOTAL ESTRADIOL: CPT

## 2024-09-06 PROCEDURE — 36415 COLL VENOUS BLD VENIPUNCTURE: CPT

## 2024-09-06 PROCEDURE — 82627 DEHYDROEPIANDROSTERONE: CPT

## 2024-09-06 PROCEDURE — 80053 COMPREHEN METABOLIC PANEL: CPT

## 2024-09-06 PROCEDURE — 84270 ASSAY OF SEX HORMONE GLOBUL: CPT

## 2024-09-06 PROCEDURE — 82533 TOTAL CORTISOL: CPT

## 2024-09-06 PROCEDURE — 3008F BODY MASS INDEX DOCD: CPT | Performed by: INTERNAL MEDICINE

## 2024-09-06 PROCEDURE — 83970 ASSAY OF PARATHORMONE: CPT

## 2024-09-06 PROCEDURE — 83002 ASSAY OF GONADOTROPIN (LH): CPT

## 2024-09-06 PROCEDURE — 1036F TOBACCO NON-USER: CPT | Performed by: INTERNAL MEDICINE

## 2024-09-06 PROCEDURE — 80061 LIPID PANEL: CPT

## 2024-09-06 PROCEDURE — 82306 VITAMIN D 25 HYDROXY: CPT

## 2024-09-06 PROCEDURE — 84443 ASSAY THYROID STIM HORMONE: CPT

## 2024-09-06 PROCEDURE — 84143 ASSAY OF 17-HYDROXYPREGNENO: CPT

## 2024-09-06 PROCEDURE — 99214 OFFICE O/P EST MOD 30 MIN: CPT | Performed by: INTERNAL MEDICINE

## 2024-09-06 NOTE — PROGRESS NOTES
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      Background Hx:  Patient was born prematurely ~36 weeks, with significant for, lower weight and progressive hypotonic along with difficulty feeding and decrease appetite. His initial care was done in [facility] but shortly after transferred to [facility]. 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 Childhood and adulthood has been marked by neurocognitive delayed, behavioral issues such as PTDS, OCD, anxiety, violent mood, hyperphagia and obesity, and crypocorchisism. He used to follow in the pediatric department of [facility] and from 7081-1394 was treated with GH analogs. No other hormone replacement therapy.      She reports that hyperphagia is still a problem needing to lock down the food and hide it, but given strict diet and exercise activity he has never been above 200 lbs. He does have craving for sweets but no salt. In the past, he has been enrolled PWS dedicated program from [facility].      During last couple of month's patient's mother have noticed him more fatigued. Requiring more frequent naps during the day. Sleep study was done but resulted inconclusive since patient was not able to follow instructions. Also was referred to urology to evaluate hypogonadism as cause of fatigue. Biochemical labs showed Testosterone totoal : 95, testosterone free: 12.2, prolactin <1.0, estradiol: 3.5, FSH: 7.1, LH: 1.7, TSH: 0.74 . In addition, they have recently consulted neurology due to new onset of worsening headaches for last 2 months. CT head 05/2022 was unremarkable.      Pituitary labs were repeated and were consistent with above, low testotrone , low FSH/LH and undectable prolactin  (confirmed with diluted sample. MRI sella 7/2022 evidenced a really small pituitary gland with a thin pituitary stalk. Optic chiasma preserved. 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        Last visit: 09/2023    Interval Hx:  The patient states he is doing fine and he is not taking HCG and clomiphene as both denied by the insurance as a prior authorization was needed for that medication per the patient's mom. The patient was supposed to come here to the office in 11/2023, however the mother states that they had come down with COVID and she forgot to bring the patient. DEXA scan was also not done,  as the patient's sister tried to commit suicide which kept the patient's mother busy with the sister. Energy wise, it is variable and the patient sleeps for the most part. He does not get any erections. He gets frustrated easily and that comes across verbally, however no aggression since 2019.    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 Offnote, the patient's mother also mentions about her brother being on testosterone supplementation for low testosterone and is curious if that could be a choice for the patient as well.    ROS:  Negative unless noted above    Patient Active Problem List   Diagnosis    Abnormal liver enzymes    Acute electrocardiography changes    Adjustment disorder with mixed disturbance of emotions and conduct    Allergic rhinitis    Anemia    Ataxia    Attention-deficit/hyperactivity disorder    Bilateral hip pain    Bilateral knee pain    Pain in both lower extremities    Canker sore    Central obesity    Cervical lymphadenopathy    Daytime somnolence    Dental disorder    Dog bite of face    Dorsal cervical fat pad    Foot sprain, left, initial encounter    Elevated creatine kinase level    Gastric dysmotility     Gastroparesis    Hypercholesterolemia    Hyperglycemia    Hypogonadism in male    Impairment of balance    Injury of ankle and foot, left, initial encounter    Intractable chronic migraine without aura and without status migrainosus    Kyphosis, acquired    Low testosterone in male    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    Muscle cramps    Nasal dryness    Nightmare disorder    Obsessive compulsive disorder    PTSD (post-traumatic stress disorder)    Nocturnal leg movements    Mental disorder    Obesity    Oppositional defiant disorder    Poor sleep pattern    Prader-Willi syndrome (HHS-HCC)    Prediabetes    Psychological factor affecting physical condition    Recurrent HSV (herpes simplex virus)    Sleep-disordered breathing    Superficial bacterial infection of skin    Tinea corporis    Tinea cruris    Unsteady gait when walking    Urinary incontinence    Vitamin D deficiency    Weight gain    Witnessed episode of apnea     Family History   Problem Relation Name Age of Onset    Asthma Mother      Hyperlipidemia Mother      Hypertension Mother      Migraines Mother      Asthma Father      Hypertension Father      Diabetes Father      Asthma Sister      Anxiety disorder Sister      Depression Sister      Migraines Sister      Diabetes Other       No past surgical history on file.  Social History     Socioeconomic History    Marital status: Single     Spouse name: Not on file    Number of children: Not on file    Years of education: Not on file    Highest education level: Not on file   Occupational History    Not on file   Tobacco Use    Smoking status: Never    Smokeless tobacco: Never   Substance and Sexual Activity    Alcohol use: Never    Drug use: Never    Sexual activity: Not on file   Other Topics Concern    Not on file   Social History Narrative    Not on file     Social Determinants of Health     Financial Resource Strain: Not on file   Food Insecurity: Not on file  "  Transportation Needs: Not on file   Physical Activity: Not on file   Stress: Not on file   Social Connections: Not on file   Intimate Partner Violence: Not on file   Housing Stability: Not on file     Allergies   Allergen Reactions    Clonazepam Unknown    Dextroamphetamine-Amphetamine Unknown    Ziprasidone Hcl Other and Hallucinations     Objective:    Vitals:  Vitals:    09/06/24 1014   BP: 115/74   Pulse: 60   Weight: 71.2 kg (157 lb)   Height: 1.473 m (4' 10\")       Physical Exam:    General: short stature, cognitively delayed young male  HEENT: AT/NC  Neck: trachea in midline, mildly enlarged thyroid  Resp: CTA B/L  CVS: normal s1 and s2  Abdomen: soft and non tender to palpation, BS+  Skin: warm, dry and intact  Neuro: DTR 2 delayed relaxation, CN 2-12 grossly intact  Psych: cooperative but slow    Medications:    Current Outpatient Medications on File Prior to Visit   Medication Sig Dispense Refill    Adderall XR 30 mg 24 hr capsule Take 1 capsule (30 mg) by mouth once daily in the morning. 30 capsule 0    Adderall XR 30 mg 24 hr capsule Take 1 capsule (30 mg) by mouth once daily in the morning. 30 capsule 0    Adderall XR 30 mg 24 hr capsule Take 1 capsule (30 mg) by mouth once daily in the morning. Do not crush or chew. 30 capsule 0    Adderall XR 30 mg 24 hr capsule Take 1 capsule (30 mg) by mouth once daily in the morning. Do not crush or chew. Do not start before March 21, 2024. 30 capsule 0    Adderall XR 30 mg 24 hr capsule Take 1 capsule (30 mg) by mouth once daily in the morning. Do not crush or chew. 30 capsule 0    Adderall XR 30 mg 24 hr capsule Take 1 capsule (30 mg) by mouth once daily in the morning. Do not crush or chew. 30 capsule 0    Adderall XR 30 mg 24 hr capsule Take 1 capsule (30 mg) by mouth once daily in the morning. Do not crush or chew. Do not fill before August 2, 2024. 30 capsule 0    Adderall XR 30 mg 24 hr capsule Take 1 capsule (30 mg) by mouth once daily in the morning. Do " "not crush or chew. Do not fill before September 1, 2024. 30 capsule 0    Adderall XR 5 mg 24 hr capsule Take 1 capsule (5 mg) by mouth once daily in the morning. Do not crush or chew. 30 capsule 0    Adderall XR 5 mg 24 hr capsule Take 1 capsule (5 mg) by mouth once daily in the morning. Do not crush or chew. 30 capsule 0    Adderall XR 5 mg 24 hr capsule Take 1 capsule (5 mg) by mouth once daily in the morning. Do not crush or chew. Do not fill before August 2, 2024. 30 capsule 0    Adderall XR 5 mg 24 hr capsule Take 1 capsule (5 mg) by mouth once daily in the morning. Do not crush or chew. Do not fill before September 1, 2024. 30 capsule 0    ARIPiprazole (Abilify Maintena) 400 mg injection Inject 400 mg into the muscle every 28 (twenty-eight) days. 1 each 3    chorionic gonadotropin (Pregnyl) 10,000 unit injection Inject into the shoulder, thigh, or buttocks.      clomiPHENE (Clomid) 50 mg tablet Take 1 tablet (50 mg) by mouth once daily.      coQ10, ubiquinol, 100 mg capsule Take 1 capsule (100 mg) by mouth once daily.      ergocalciferol (Vitamin D-2) 1.25 MG (14102 UT) capsule Take 1 capsule (1,250 mcg) by mouth once a week.      ibuprofen (IBU) 600 mg tablet Take by mouth every 6 hours if needed.      insulin syringe-needle U-100 31G X 5/16\" 0.5 mL syringe USE AS DIRECTED.      ketoconazole (NIZOral) 2 % shampoo APPLY 1 APPLICATION EXTERNALLY TO SCALP 2-3 TIMES A WEEK FOR 2 WEEKS THEN ONCE WEEKLY FOR 2 WEEKS THEN AS NEEDED      multivitamin with minerals (MULTIPLE VITAMIN-MINERALS ORAL) Take 1 tablet by mouth once daily.      mupirocin (Bactroban) 2 % cream APPLY THIN LAYER TO AFFECTED AREA(S) 2-3 TIMES DAILY.      prazosin (Minipress) 2 mg capsule Take 2 capsules (4 mg) by mouth once daily at bedtime. 60 capsule 5    sertraline (Zoloft) 100 mg tablet Take 2 tablets (200 mg) by mouth once daily. 60 tablet 3    sertraline (Zoloft) 50 mg tablet Take 1 tablet (50 mg) by mouth once daily. 30 tablet 3     No " current facility-administered medications on file prior to visit.     Labs:     Latest Reference Range & Units 06/09/22 09:24 04/23/24 11:32   FOLLICLE STIMULATING HORMONE IU/L 8.3    Hemoglobin A1C see below %  5.7 (H)   LH IU/L 1.3    PROLACTIN 2.0 - 18.0 ug/L 1.0 (L)    Thyroid Stimulating Hormone 0.44 - 3.98 mIU/L  0.76   Vitamin D, 25-Hydroxy, Total 30 - 100 ng/mL  34   Testosterone, Free 35.0 - 155.0 pg/mL 9.0 (L)    Cortisol  A.M. 5.0 - 20.0 ug/dL 14.1    Baseline Prolactin ug/L 1.0    (H): Data is abnormally high  (L): Data is abnormally low     A&P:    The patient is a 34 year old male who presents to the office today to follow up for hypogonadism. Discussed with the patient and his mother regarding the denial of HCG and Clomiphene, and how giving testosterone would not be in the best interest of the patient due to his aggression and frustration episodes. The patient's mother was agreeable to it. Also educated about the other alternatives available, like controlling his weight with medications like GLP-1 as they can suppress the patient's appetite. The [facility] see [facility], so encouraged them to ask them about a study they are doing to get enrolled and see if that can be of any help to the patient. In the meanwhile, we will try and get the paperwork sorted to get pre-approval for Ozempic or Wegovy whatever is available.     - Get DEXA and sleep study done  - Also ordered labs: LH, FSH, Prolactin, TSH, FT4, Estrogen, DHEAS, Cortisol, PTH, Testosterone, 17-hydroxyproglenone, IGF-1.  - Prescribed Ozempic 0.5 mg Q weekly to start with once insurance approves.  - We will discuss the further plan of action once we get those results back.    RTC in 6 months.    The patient was seen and evaluated with Dr. Pena.    Socorro Bateman MD  PGY-4 Endocrinology Fellow

## 2024-09-09 LAB
IGF-I SERPL-MCNC: 179 NG/ML (ref 82–243)
IGF-I Z-SCORE SERPL: 0.5
SHBG SERPL-SCNC: 29 NMOL/L (ref 17–56)

## 2024-09-10 ENCOUNTER — APPOINTMENT (OUTPATIENT)
Dept: BEHAVIORAL HEALTH | Facility: CLINIC | Age: 34
End: 2024-09-10
Payer: MEDICAID

## 2024-09-10 VITALS
BODY MASS INDEX: 33.54 KG/M2 | DIASTOLIC BLOOD PRESSURE: 77 MMHG | SYSTOLIC BLOOD PRESSURE: 128 MMHG | TEMPERATURE: 98.1 F | HEART RATE: 64 BPM | RESPIRATION RATE: 18 BRPM | WEIGHT: 160.5 LBS

## 2024-09-10 DIAGNOSIS — F06.31 MOOD DISORDER WITH DEPRESSIVE FEATURES DUE TO GENERAL MEDICAL CONDITION: Primary | ICD-10-CM

## 2024-09-10 LAB — 17OH-PREG SERPL-MCNC: 76 NG/DL

## 2024-09-10 ASSESSMENT — COLUMBIA-SUICIDE SEVERITY RATING SCALE - C-SSRS
ATTEMPT LIFETIME: NO
2. HAVE YOU ACTUALLY HAD ANY THOUGHTS OF KILLING YOURSELF?: NO
6. HAVE YOU EVER DONE ANYTHING, STARTED TO DO ANYTHING, OR PREPARED TO DO ANYTHING TO END YOUR LIFE?: NO
ATTEMPT SINCE LAST CONTACT: NO
TOTAL  NUMBER OF INTERRUPTED ATTEMPTS SINCE LAST CONTACT: NO
SUICIDE, SINCE LAST CONTACT: NO
1. HAVE YOU WISHED YOU WERE DEAD OR WISHED YOU COULD GO TO SLEEP AND NOT WAKE UP?: NO
TOTAL  NUMBER OF ABORTED OR SELF INTERRUPTED ATTEMPTS SINCE LAST CONTACT: NO
1. SINCE LAST CONTACT, HAVE YOU WISHED YOU WERE DEAD OR WISHED YOU COULD GO TO SLEEP AND NOT WAKE UP?: NO
TOTAL  NUMBER OF ABORTED OR SELF INTERRUPTED ATTEMPTS LIFETIME: NO
TOTAL  NUMBER OF INTERRUPTED ATTEMPTS LIFETIME: NO
6. HAVE YOU EVER DONE ANYTHING, STARTED TO DO ANYTHING, OR PREPARED TO DO ANYTHING TO END YOUR LIFE?: NO
2. HAVE YOU ACTUALLY HAD ANY THOUGHTS OF KILLING YOURSELF?: NO

## 2024-09-10 NOTE — PROGRESS NOTES
Patient here at the clinic today to receive his monthly Abilify Maintena 400mg for Mood disorder with depressive features      Patient identified by full name and  and vitals obtained. patient last seen by provider 7/3/24, last seen by nurse on 24 Medication verified by providers note and medication order.        Appetite: no change  Sleep: No change  Appearance: clean, age appropriate, well-groomed  Build: average  Attitude: cooperative, calm, pleasant, friendly, open  Eye Contact: normal  Activity: alert, attentive, appropriate  Speech: appropriate & spontaneous, normal  Delusions: patient denies   Thought Content: logical  Thought Process: logical  Judgement/insight: Fair  Mood: calm happy  Affect: appropriate  AH/VH/SI/HI: patient denies  Access to firearms/weapons: No  Depression: patient denies  Thoughts of hopelessness: Patient denies  Anxiety: patient denies  Self-injurious behavior: patient denies  Cravings/Urges: NA  Last use: NA  Tobacco Use: non-smoker  Spiritual or cultural practices that may affect your care or impact your health care decisions: no  Living situation lives with mom whom is his guardian   Employed: No        Patient here at the clinic today to receive his monthly Abilify Maintena for Mood disorder accompanied by his mother. Patient was cooperative and engaged during this visit. Per mom there has been no issues with previous injection and denies any issues with SI/HI, VH/AH, depression and no recent hospitalizations      Patient received injection of Abilify Maintena 400mg/2ml via 23 gauge w/o incident into left deltoid area. Patient tolerated injection well, no reaction at injection site.      LOT # [id number]  EXP:  2026  NDC: 53099-155-57        Patient will RTC in 4 weeks 10/8/2024    LUCIANA Siddiqui

## 2024-09-11 ENCOUNTER — TELEPHONE (OUTPATIENT)
Dept: PRIMARY CARE | Facility: CLINIC | Age: 34
End: 2024-09-11
Payer: MEDICAID

## 2024-09-11 NOTE — TELEPHONE ENCOUNTER
----- Message from Christopher D'Amico sent at 9/11/2024  8:33 AM EDT -----  Vitamin D moderately low at 10, recommend OTC vitamin D3, 8274-6453 units daily.    Borderline anemia, stable over the past year.    Remaining labs ordered through our office are unremarkable

## 2024-09-11 NOTE — TELEPHONE ENCOUNTER
Result Communication    Resulted Orders   CBC   Result Value Ref Range    WBC 5.4 4.4 - 11.3 x10*3/uL    nRBC 0.0 0.0 - 0.0 /100 WBCs    RBC 4.78 4.50 - 5.90 x10*6/uL    Hemoglobin 12.8 (L) 13.5 - 17.5 g/dL    Hematocrit 41.3 41.0 - 52.0 %    MCV 86 80 - 100 fL    MCH 26.8 26.0 - 34.0 pg    MCHC 31.0 (L) 32.0 - 36.0 g/dL    RDW 13.7 11.5 - 14.5 %    Platelets 258 150 - 450 x10*3/uL   Comprehensive Metabolic Panel   Result Value Ref Range    Glucose 102 (H) 74 - 99 mg/dL    Sodium 143 136 - 145 mmol/L    Potassium 4.5 3.5 - 5.3 mmol/L    Chloride 105 98 - 107 mmol/L    Bicarbonate 28 21 - 32 mmol/L    Anion Gap 15 10 - 20 mmol/L    Urea Nitrogen 27 (H) 6 - 23 mg/dL    Creatinine 0.89 0.50 - 1.30 mg/dL    eGFR >90 >60 mL/min/1.73m*2      Comment:      Calculations of estimated GFR are performed using the 2021 CKD-EPI Study Refit equation without the race variable for the IDMS-Traceable creatinine methods.  https://jasn.asnjournals.org/content/early/2021/09/22/ASN.2293165470    Calcium 10.1 8.6 - 10.6 mg/dL    Albumin 4.1 3.4 - 5.0 g/dL    Alkaline Phosphatase 49 33 - 120 U/L    Total Protein 7.2 6.4 - 8.2 g/dL    AST 14 9 - 39 U/L    Bilirubin, Total 0.3 0.0 - 1.2 mg/dL    ALT 10 10 - 52 U/L      Comment:      Patients treated with Sulfasalazine may generate falsely decreased results for ALT.   Lipid Panel   Result Value Ref Range    Cholesterol 155 0 - 199 mg/dL      Comment:            Age      Desirable   Borderline High   High     0-19 Y     0 - 169       170 - 199     >/= 200    20-24 Y     0 - 189       190 - 224     >/= 225         >24 Y     0 - 199       200 - 239     >/= 240   **All ranges are based on fasting samples. Specific   therapeutic targets will vary based on patient-specific   cardiac risk.    Pediatric guidelines reference:Pediatrics 2011, 128(S5).Adult guidelines reference: NCEP ATPIII Guidelines,FAUSTINA 2001, 258:2486-97    Venipuncture immediately after or during the administration of Metamizole  may lead to falsely low results. Testing should be performed immediately prior to Metamizole dosing.    HDL-Cholesterol 52.9 mg/dL      Comment:        Age       Very Low   Low     Normal    High    0-19 Y    < 35      < 40     40-45     ----  20-24 Y    ----     < 40      >45      ----        >24 Y      ----     < 40     40-60      >60      Cholesterol/HDL Ratio 2.9       Comment:        Ref Values  Desirable  < 3.4  High Risk  > 5.0    LDL Calculated 87 <=99 mg/dL      Comment:                                  Near   Borderline      AGE      Desirable  Optimal    High     High     Very High     0-19 Y     0 - 109     ---    110-129   >/= 130     ----    20-24 Y     0 - 119     ---    120-159   >/= 160     ----      >24 Y     0 -  99   100-129  130-159   160-189     >/=190      VLDL 15 0 - 40 mg/dL    Triglycerides 77 0 - 149 mg/dL      Comment:         Age         Desirable   Borderline High   High     Very High   0 D-90 D    19 - 174         ----         ----        ----  91 D- 9 Y     0 -  74        75 -  99     >/= 100      ----    10-19 Y     0 -  89        90 - 129     >/= 130      ----    20-24 Y     0 - 114       115 - 149     >/= 150      ----         >24 Y     0 - 149       150 - 199    200- 499    >/= 500    Venipuncture immediately after or during the administration of Metamizole may lead to falsely low results. Testing should be performed immediately prior to Metamizole dosing.    Non HDL Cholesterol 102 0 - 149 mg/dL      Comment:            Age       Desirable   Borderline High   High     Very High     0-19 Y     0 - 119       120 - 144     >/= 145    >/= 160    20-24 Y     0 - 149       150 - 189     >/= 190      ----         >24 Y    30 mg/dL above LDL Cholesterol goal     TSH with reflex to Free T4 if abnormal   Result Value Ref Range    Thyroid Stimulating Hormone 0.79 0.44 - 3.98 mIU/L    Narrative    TSH testing is performed using different testing methodology at [facility] than  at other [facility] hospitals. Direct result comparisons should only be made within the same method.     Vitamin D 25-Hydroxy,Total (for eval of Vitamin D levels)   Result Value Ref Range    Vitamin D, 25-Hydroxy, Total 10 (L) 30 - 100 ng/mL    Narrative    Deficiency:         < 20   ng/ml  Insufficiency:      20-29  ng/ml  Sufficiency:         ng/ml  This assay accurately quantifies the sum of Vitamin D3, 25-Hydroxy and Vitamin D2,25-Hydroxy.       10:09 AM      Results were successfully communicated with the parents and they acknowledged their understanding.

## 2024-09-12 LAB
TESTOSTERONE FREE (CHAN): 6.4 PG/ML (ref 35–155)
TESTOSTERONE,TOTAL,LC-MS/MS: 42 NG/DL (ref 250–1100)

## 2024-09-25 ENCOUNTER — APPOINTMENT (OUTPATIENT)
Dept: PRIMARY CARE | Facility: CLINIC | Age: 34
End: 2024-09-25
Payer: MEDICAID

## 2024-09-25 ENCOUNTER — OFFICE VISIT (OUTPATIENT)
Dept: BEHAVIORAL HEALTH | Facility: CLINIC | Age: 34
End: 2024-09-25
Payer: MEDICAID

## 2024-09-25 VITALS
SYSTOLIC BLOOD PRESSURE: 121 MMHG | BODY MASS INDEX: 32.46 KG/M2 | TEMPERATURE: 97.8 F | WEIGHT: 155.3 LBS | RESPIRATION RATE: 18 BRPM | DIASTOLIC BLOOD PRESSURE: 79 MMHG | HEART RATE: 76 BPM

## 2024-09-25 VITALS
HEIGHT: 60 IN | DIASTOLIC BLOOD PRESSURE: 77 MMHG | SYSTOLIC BLOOD PRESSURE: 114 MMHG | WEIGHT: 156 LBS | HEART RATE: 59 BPM | OXYGEN SATURATION: 98 % | BODY MASS INDEX: 30.63 KG/M2

## 2024-09-25 DIAGNOSIS — F42.4 EXCORIATION (SKIN-PICKING) DISORDER: ICD-10-CM

## 2024-09-25 DIAGNOSIS — F43.10 PTSD (POST-TRAUMATIC STRESS DISORDER): ICD-10-CM

## 2024-09-25 DIAGNOSIS — Z00.00 WELLNESS EXAMINATION: ICD-10-CM

## 2024-09-25 DIAGNOSIS — B00.9 RECURRENT HSV (HERPES SIMPLEX VIRUS): ICD-10-CM

## 2024-09-25 DIAGNOSIS — D64.9 ANEMIA, UNSPECIFIED TYPE: ICD-10-CM

## 2024-09-25 DIAGNOSIS — H54.7 DECREASED VISUAL ACUITY: ICD-10-CM

## 2024-09-25 DIAGNOSIS — F90.2 ATTENTION DEFICIT HYPERACTIVITY DISORDER (ADHD), COMBINED TYPE: ICD-10-CM

## 2024-09-25 DIAGNOSIS — F06.31 MOOD DISORDER WITH DEPRESSIVE FEATURES DUE TO GENERAL MEDICAL CONDITION: ICD-10-CM

## 2024-09-25 DIAGNOSIS — E55.9 VITAMIN D DEFICIENCY: ICD-10-CM

## 2024-09-25 DIAGNOSIS — R73.03 PREDIABETES: ICD-10-CM

## 2024-09-25 DIAGNOSIS — L21.9 SEBORRHEIC DERMATITIS: ICD-10-CM

## 2024-09-25 DIAGNOSIS — Q87.11 PRADER-WILLI SYNDROME (HHS-HCC): Primary | ICD-10-CM

## 2024-09-25 PROCEDURE — 3008F BODY MASS INDEX DOCD: CPT | Performed by: STUDENT IN AN ORGANIZED HEALTH CARE EDUCATION/TRAINING PROGRAM

## 2024-09-25 PROCEDURE — 90471 IMMUNIZATION ADMIN: CPT | Performed by: STUDENT IN AN ORGANIZED HEALTH CARE EDUCATION/TRAINING PROGRAM

## 2024-09-25 PROCEDURE — 99214 OFFICE O/P EST MOD 30 MIN: CPT | Performed by: STUDENT IN AN ORGANIZED HEALTH CARE EDUCATION/TRAINING PROGRAM

## 2024-09-25 PROCEDURE — 99395 PREV VISIT EST AGE 18-39: CPT | Performed by: STUDENT IN AN ORGANIZED HEALTH CARE EDUCATION/TRAINING PROGRAM

## 2024-09-25 PROCEDURE — 90656 IIV3 VACC NO PRSV 0.5 ML IM: CPT | Performed by: STUDENT IN AN ORGANIZED HEALTH CARE EDUCATION/TRAINING PROGRAM

## 2024-09-25 PROCEDURE — 99215 OFFICE O/P EST HI 40 MIN: CPT | Performed by: NURSE PRACTITIONER

## 2024-09-25 PROCEDURE — 1036F TOBACCO NON-USER: CPT | Performed by: STUDENT IN AN ORGANIZED HEALTH CARE EDUCATION/TRAINING PROGRAM

## 2024-09-25 RX ORDER — DEXTROAMPHETAMINE SULFATE, DEXTROAMPHETAMINE SACCHARATE, AMPHETAMINE SULFATE AND AMPHETAMINE ASPARTATE 1.25; 1.25; 1.25; 1.25 MG/1; MG/1; MG/1; MG/1
5 CAPSULE, EXTENDED RELEASE ORAL
Qty: 31 CAPSULE | Refills: 0 | Status: SHIPPED | OUTPATIENT
Start: 2024-10-25

## 2024-09-25 RX ORDER — DEXTROAMPHETAMINE SULFATE, DEXTROAMPHETAMINE SACCHARATE, AMPHETAMINE SULFATE AND AMPHETAMINE ASPARTATE 1.25; 1.25; 1.25; 1.25 MG/1; MG/1; MG/1; MG/1
5 CAPSULE, EXTENDED RELEASE ORAL
Qty: 31 CAPSULE | Refills: 0 | Status: SHIPPED | OUTPATIENT
Start: 2024-11-24

## 2024-09-25 RX ORDER — DEXTROAMPHETAMINE SULFATE, DEXTROAMPHETAMINE SACCHARATE, AMPHETAMINE SULFATE AND AMPHETAMINE ASPARTATE 7.5; 7.5; 7.5; 7.5 MG/1; MG/1; MG/1; MG/1
30 CAPSULE, EXTENDED RELEASE ORAL EVERY MORNING
Qty: 31 CAPSULE | Refills: 0 | Status: SHIPPED | OUTPATIENT
Start: 2024-09-25 | End: 2024-09-25 | Stop reason: SDUPTHER

## 2024-09-25 RX ORDER — SERTRALINE HYDROCHLORIDE 50 MG/1
50 TABLET, FILM COATED ORAL DAILY
Qty: 30 TABLET | Refills: 3 | Status: SHIPPED | OUTPATIENT
Start: 2024-09-25 | End: 2025-01-23

## 2024-09-25 RX ORDER — MUPIROCIN CALCIUM 20 MG/G
CREAM TOPICAL
Qty: 30 G | Refills: 1 | Status: SHIPPED | OUTPATIENT
Start: 2024-09-25

## 2024-09-25 RX ORDER — KETOCONAZOLE 20 MG/ML
SHAMPOO, SUSPENSION TOPICAL
Qty: 120 ML | Refills: 3 | Status: SHIPPED | OUTPATIENT
Start: 2024-09-25

## 2024-09-25 RX ORDER — DEXTROAMPHETAMINE SULFATE, DEXTROAMPHETAMINE SACCHARATE, AMPHETAMINE SULFATE AND AMPHETAMINE ASPARTATE 7.5; 7.5; 7.5; 7.5 MG/1; MG/1; MG/1; MG/1
30 CAPSULE, EXTENDED RELEASE ORAL EVERY MORNING
Qty: 31 CAPSULE | Refills: 0 | Status: SHIPPED | OUTPATIENT
Start: 2024-11-24 | End: 2024-09-25 | Stop reason: SDUPTHER

## 2024-09-25 RX ORDER — DEXTROAMPHETAMINE SULFATE, DEXTROAMPHETAMINE SACCHARATE, AMPHETAMINE SULFATE AND AMPHETAMINE ASPARTATE 7.5; 7.5; 7.5; 7.5 MG/1; MG/1; MG/1; MG/1
30 CAPSULE, EXTENDED RELEASE ORAL EVERY MORNING
Qty: 31 CAPSULE | Refills: 0 | Status: SHIPPED | OUTPATIENT
Start: 2024-10-25 | End: 2024-09-25 | Stop reason: SDUPTHER

## 2024-09-25 RX ORDER — ARIPIPRAZOLE 400 MG
400 KIT INTRAMUSCULAR
Qty: 1 EACH | Refills: 3 | Status: SHIPPED | OUTPATIENT
Start: 2024-09-25

## 2024-09-25 RX ORDER — DEXTROAMPHETAMINE SULFATE, DEXTROAMPHETAMINE SACCHARATE, AMPHETAMINE SULFATE AND AMPHETAMINE ASPARTATE 1.25; 1.25; 1.25; 1.25 MG/1; MG/1; MG/1; MG/1
5 CAPSULE, EXTENDED RELEASE ORAL
Qty: 31 CAPSULE | Refills: 0 | Status: SHIPPED | OUTPATIENT
Start: 2024-09-25 | End: 2024-09-25 | Stop reason: SDUPTHER

## 2024-09-25 RX ORDER — PRAZOSIN HYDROCHLORIDE 2 MG/1
4 CAPSULE ORAL NIGHTLY
Qty: 60 CAPSULE | Refills: 3 | Status: SHIPPED | OUTPATIENT
Start: 2024-09-25

## 2024-09-25 RX ORDER — SERTRALINE HYDROCHLORIDE 100 MG/1
200 TABLET, FILM COATED ORAL DAILY
Qty: 60 TABLET | Refills: 3 | Status: SHIPPED | OUTPATIENT
Start: 2024-09-25 | End: 2025-01-23

## 2024-09-25 ASSESSMENT — COLUMBIA-SUICIDE SEVERITY RATING SCALE - C-SSRS
TOTAL  NUMBER OF INTERRUPTED ATTEMPTS LIFETIME: NO
TOTAL  NUMBER OF ABORTED OR SELF INTERRUPTED ATTEMPTS SINCE LAST CONTACT: NO
6. HAVE YOU EVER DONE ANYTHING, STARTED TO DO ANYTHING, OR PREPARED TO DO ANYTHING TO END YOUR LIFE?: NO
2. HAVE YOU ACTUALLY HAD ANY THOUGHTS OF KILLING YOURSELF?: NO
1. IN THE PAST MONTH, HAVE YOU WISHED YOU WERE DEAD OR WISHED YOU COULD GO TO SLEEP AND NOT WAKE UP?: NO
SUICIDE, SINCE LAST CONTACT: NO
ATTEMPT LIFETIME: NO
6. HAVE YOU EVER DONE ANYTHING, STARTED TO DO ANYTHING, OR PREPARED TO DO ANYTHING TO END YOUR LIFE?: NO
ATTEMPT SINCE LAST CONTACT: NO
2. HAVE YOU ACTUALLY HAD ANY THOUGHTS OF KILLING YOURSELF?: NO
TOTAL  NUMBER OF INTERRUPTED ATTEMPTS SINCE LAST CONTACT: NO
2. HAVE YOU ACTUALLY HAD ANY THOUGHTS OF KILLING YOURSELF?: NO
TOTAL  NUMBER OF ABORTED OR SELF INTERRUPTED ATTEMPTS LIFETIME: NO
1. SINCE LAST CONTACT, HAVE YOU WISHED YOU WERE DEAD OR WISHED YOU COULD GO TO SLEEP AND NOT WAKE UP?: NO
1. HAVE YOU WISHED YOU WERE DEAD OR WISHED YOU COULD GO TO SLEEP AND NOT WAKE UP?: NO
6. HAVE YOU EVER DONE ANYTHING, STARTED TO DO ANYTHING, OR PREPARED TO DO ANYTHING TO END YOUR LIFE?: NO

## 2024-09-25 ASSESSMENT — ENCOUNTER SYMPTOMS
LOSS OF SENSATION IN FEET: 0
OCCASIONAL FEELINGS OF UNSTEADINESS: 0
DEPRESSION: 0

## 2024-09-25 NOTE — PATIENT INSTRUCTIONS
ASSESSMENT: Mr. Andrews presents at baseline mental health wise.  See treatment plan below.    PLAN:           problems treated   f/u requested to prevent relapse   medications renewed/re-ordered    1. Continue Adderall XR 5 mg by mouth at noon as booster for ADHD. Not able to get name brand in IR. Therefore, XR ordered.  2. Continue Prazosin 4 mg by mouth at bedtime for nightmares related to PTSD.   3. Continue sertraline (Zoloft) 250 mg by mouth daily for PTSD and skin excoriation  4. Continue Adderall extended release 30 mg by mouth daily for ADHD- must be name brand. I have personally reviewed the OARRS report 9/25/24. I have considered the risks of abuse, dependence, addiction and diversion.  Stim agreement signed 7/3/2024  5. Continue Abilify Maintena 400 mg IM Q 4 weeks for moods   6. Risks, benefits, alternatives, off-label uses, and side effects of medications have been discussed with patient/caregiver. There is no report of signs/symptoms consistent with medication-induced impairment in daily functioning. At this time, benefits of medication felt to outweigh potential risks. Will continue to reassess need for psychotropic medication at regular 3 month intervals.  7. Return to clinic 3 months for an in-person appointment or earlier if needed. Call [phone] to reschedule.  8. Mom will obtain pharmacogenomics testing (PGT) results from previous MH & scan to [email or web address] or bring to office   9. Expert Jillal completed and given    Thank you for seeing me today. If you have any questions, do not hesitate to contact my office.  Emilee Anthony    TREATMENT TYPE         counseling and coordination of care; addressing signs and symptoms of illness; risks/benefits and side effects of medications; and behavioral approaches to illness.  This note was created using electronic dictation. There may be errors in syntax and meaning. Please contact the office with any questions.

## 2024-09-25 NOTE — PROGRESS NOTES
34-year-old male with Prader-Willi presenting for annual exam.  No new concerns.    Hyperlipidemia  Stable, currently not medicated     Hyperglycemia  Stable, not currently medicated     Anemia  Stable     Vitamin D deficiency  Recommend OTC vitamin D3, 2000 units daily     Prader-Willi syndrome  [facility]    Seborrheic dermatitis  Stable on current regimen    Excoriations  Does well with mupirocin when needed    Recurrent HSV  Stable on current regimen    SocHx:     - Smoking: Never  - Alcohol: None  - Drug use: None     12 point ROS reviewed and negative other than as stated in HPI        General: Alert, oriented, pleasant, in no acute distress  HEENT:   Head: normocephalic, atraumatic;   eyes: EOMI, no scleral icterus;  Neck: soft, supple, non-tender, no masses appreciated  CV: Heart with regular rate and rhythm, normal S1/S2, no murmurs  Lungs: CTAB without wheezing, rhonchi or rales; good respiratory effort, no increased work of breathing  Abdomen: soft, non-tender, non-distended, no masses appreciated  Extremities: no edema, no cyanosis  Neuro: Cranial nerves grossly intact; alert and oriented  Psych: Appropriate mood and affect     #HM  -Labs done before visit and reviewed with patient  -Vaccines:     Flu: In office today     Tdap: 2021     # Hyperlipidemia  -no medication currently  - Recent lipids satisfactory     #Prediabetes  -A1c 5.7     #History of anemia  - Borderline, stable     #Vitamin D deficiency  - Recommend OTC vitamin D3, 2000 units daily     # Prader-Willi syndrome  -[facility]    #Seborrheic dermatitis  - Rx ketoconazole shampoo    #Excoriations  - Rx mupirocin     # Recurrent HSV  - Continue valacyclovir      F/U 6 months, sooner if indicated     Chris D'Amico,
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
## 2024-10-02 ENCOUNTER — APPOINTMENT (OUTPATIENT)
Dept: SLEEP MEDICINE | Facility: CLINIC | Age: 34
End: 2024-10-02
Payer: MEDICAID

## 2024-10-08 ENCOUNTER — APPOINTMENT (OUTPATIENT)
Dept: BEHAVIORAL HEALTH | Facility: CLINIC | Age: 34
End: 2024-10-08
Payer: MEDICAID

## 2024-10-08 VITALS
TEMPERATURE: 97.8 F | HEART RATE: 77 BPM | BODY MASS INDEX: 32.62 KG/M2 | DIASTOLIC BLOOD PRESSURE: 64 MMHG | WEIGHT: 156.1 LBS | SYSTOLIC BLOOD PRESSURE: 104 MMHG | RESPIRATION RATE: 18 BRPM

## 2024-10-08 DIAGNOSIS — F06.31 MOOD DISORDER WITH DEPRESSIVE FEATURES DUE TO GENERAL MEDICAL CONDITION: Primary | ICD-10-CM

## 2024-10-08 ASSESSMENT — PAIN SCALES - GENERAL: PAINLEVEL: 0-NO PAIN

## 2024-10-08 NOTE — PROGRESS NOTES
Patient here at the clinic today to receive his monthly Abilify Maintena 400mg for Mood disorder with depressive features      Patient identified by full name and  and vitals obtained. patient last seen by provider 24, last seen by nurse on 9/10/2024 Medication verified by providers note and medication order.        Appetite: no change  Sleep: No change  Appearance: clean, age appropriate, well-groomed  Build: average  Attitude: cooperative, calm, pleasant, friendly, open  Eye Contact: normal  Activity: alert, attentive, appropriate  Speech: appropriate & spontaneous, normal  Delusions: patient denies   Thought Content: logical  Thought Process: logical  Judgement/insight: Fair  Mood: calm happy  Affect: appropriate  AH/VH/SI/HI: patient denies  Access to firearms/weapons: No  Depression: patient denies  Thoughts of hopelessness: Patient denies  Anxiety: patient denies  Self-injurious behavior: patient denies  Cravings/Urges: NA  Last use: NA  Tobacco Use: non-smoker  Spiritual or cultural practices that may affect your care or impact your health care decisions: no  Living situation lives with mom whom is his guardian   Employed: No        Patient here at the clinic today to receive his monthly Abilify Maintena for Mood disorder accompanied by his mother. Patient was cooperative and engaged during this visit. Per mom there has been no issues with previous injection and denies any issues with SI/HI, VH/AH, depression and no recent hospitalizations      Patient received injection of Abilify Maintena 400mg/2ml via 23 gauge w/o incident into right deltoid area. Patient tolerated injection well, no reaction at injection site.     RN provided education on medications sided effects, the importance of reporting any changes in the injection site, for minor arm pain utilization of ibuprofen and a warm compress should relive any discomfort.  RN has provided patient and mom direct office number for future questions and  the mobile crisis number for any emergent concerns that may arise.      LOT # [id number]  EXP:  NOV 2026  NDC: 53255-607-81        Patient will RTC in 4 weeks 11/5/2024    VIANNEY SiddiquiN

## 2024-10-22 ENCOUNTER — APPOINTMENT (OUTPATIENT)
Dept: PRIMARY CARE | Facility: CLINIC | Age: 34
End: 2024-10-22

## 2024-10-24 ENCOUNTER — TELEMEDICINE CLINICAL SUPPORT (OUTPATIENT)
Dept: GENETICS | Facility: CLINIC | Age: 34
End: 2024-10-24
Payer: MEDICAID

## 2024-10-24 ENCOUNTER — APPOINTMENT (OUTPATIENT)
Dept: GENETICS | Facility: CLINIC | Age: 34
End: 2024-10-24
Payer: MEDICAID

## 2024-10-24 VITALS
HEART RATE: 76 BPM | HEIGHT: 60 IN | WEIGHT: 158.31 LBS | DIASTOLIC BLOOD PRESSURE: 81 MMHG | SYSTOLIC BLOOD PRESSURE: 122 MMHG | TEMPERATURE: 98 F | RESPIRATION RATE: 18 BRPM | BODY MASS INDEX: 31.08 KG/M2

## 2024-10-24 DIAGNOSIS — R26.81 UNSTEADY GAIT WHEN WALKING: ICD-10-CM

## 2024-10-24 DIAGNOSIS — Z15.2 CLASS 1 OBESITY DUE TO DISRUPTION OF MC4R PATHWAY WITH SERIOUS COMORBIDITY AND BODY MASS INDEX (BMI) OF 32.0 TO 32.9 IN ADULT: ICD-10-CM

## 2024-10-24 DIAGNOSIS — K31.84 GASTROPARESIS: ICD-10-CM

## 2024-10-24 DIAGNOSIS — Q87.11 PRADER-WILLI SYNDROME (HHS-HCC): ICD-10-CM

## 2024-10-24 DIAGNOSIS — R63.5 WEIGHT GAIN: ICD-10-CM

## 2024-10-24 DIAGNOSIS — Q87.11 PRADER-WILLI SYNDROME (HHS-HCC): Primary | ICD-10-CM

## 2024-10-24 DIAGNOSIS — E88.82 CLASS 1 OBESITY DUE TO DISRUPTION OF MC4R PATHWAY WITH SERIOUS COMORBIDITY AND BODY MASS INDEX (BMI) OF 32.0 TO 32.9 IN ADULT: ICD-10-CM

## 2024-10-24 DIAGNOSIS — E66.811 CLASS 1 OBESITY DUE TO DISRUPTION OF MC4R PATHWAY WITH SERIOUS COMORBIDITY AND BODY MASS INDEX (BMI) OF 32.0 TO 32.9 IN ADULT: ICD-10-CM

## 2024-10-24 DIAGNOSIS — F42.4 SKIN PICKING HABIT: Primary | ICD-10-CM

## 2024-10-24 PROCEDURE — RXMED WILLOW AMBULATORY MEDICATION CHARGE

## 2024-10-24 PROCEDURE — 97803 MED NUTRITION INDIV SUBSEQ: CPT

## 2024-10-24 RX ORDER — ACETYLCYSTEINE 600 MG
600 CAPSULE ORAL DAILY
Qty: 90 CAPSULE | Refills: 2 | Status: SHIPPED | OUTPATIENT
Start: 2024-10-24 | End: 2024-11-23

## 2024-10-24 ASSESSMENT — ENCOUNTER SYMPTOMS
LOSS OF SENSATION IN FEET: 0
OCCASIONAL FEELINGS OF UNSTEADINESS: 0
DEPRESSION: 0

## 2024-10-24 ASSESSMENT — PATIENT HEALTH QUESTIONNAIRE - PHQ9
1. LITTLE INTEREST OR PLEASURE IN DOING THINGS: NOT AT ALL
SUM OF ALL RESPONSES TO PHQ9 QUESTIONS 1 & 2: 0
2. FEELING DOWN, DEPRESSED OR HOPELESS: NOT AT ALL
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
## 2024-10-24 NOTE — LETTER
10/25/24    [place]  [address] [address]  [place] [address]      Dear Dr. Christopher D'Amico, DO,    I am writing to confirm that your patient, Lonny Andrews  received care in my office on 10/25/24. I have enclosed a summary of the care provided to Lonny for your reference.    Please contact me with any questions you may have regarding the visit.    Sincerely,         Carrington Forrester MD  [address]  [place] [id number]    CC: No Recipients

## 2024-10-24 NOTE — PROGRESS NOTES
"[facility]   Nutrition Assessment     Date of Visit:  10/24/2024    Out-Patient Clinic:  Prader-Willi Syndrome   Type of OP Visit: Follow-up via real-time TeleHealth Video Visit (pt in clinic with doctor and RD on video)  Reason for Nutrition Assessment: Nutrition management for medical nutrition therapy for PWS complications      PROBLEM LIST/DX   Prader-Willi Syndrome                 (DX: Q87.11)  Hyperphagia   Obesity                Past Medical HX: reviewed and discussed history with team (see problem list)   Allergies: drug/food allergies reviewed and discussed with team (see allergy list)   Current Medications: medications/supplements reviewed and discussed with team (see medication list)   Biochemical and Diagnostic Testing: testing was reviewed and discussed with team      NUTRITION HISTORY AND INTAKE:   Pt and mom present in out-patient PWS clinic for a scheduled f/u clinic visit. Mom said that he has a strong food seeking behavior, no vitamins, isnt in a day or work program. Pt starts that he is in a \"bad mood\". Have a  with [employer]. Telling mom to keep working at it. Mom can't find him a day program due to his behavior. Mom states that Lonny is stubborn. Since last visit pt was in the hospital due to him eating 7 bottles.      Food Seeking Behavior:  strong food seeking behavior   Food Access Secured:  yes     DIET HISTORY AND DAILY NUTRITIONAL INTAKE:    Dietary Restrictions: 1200 kcals    Vitamin/Mineral/Supplements:    Feeding Route: all PO intake   Enteral Feedings: none    Kcal Counting: goal is 1500 daily     24 hr Diet Recall:   Location of Meals: living room   Breakfast: cereal and milk, sausage, biscuit   Lunch: salad, water   Dinner: turkey burger, sweet potato, green beans   Snacks/Beverages: carrots, celery sticks, apples, peaches, grapes     Daily Activities: stopped exercise \"refused\"     Anthropometrics:    Weight:  70.8 kg -155 lbs  (61 kg - 135 lbs) " in 17 months    Height:  4'11   BMI: 32      Classifications of Obesity:    Class I: BMI >95% or BMI >30    Class II: BMI >120% or BMI >35    Class III: BMI >140% or BMI >40     Obesity Classification Assessment:   overweight      Prader-Willi Syndrome Energy Requirements:   (Height: 147 cm)    Wgt Maintenance: 10-11 kcals/cm height = 4098-5510 kcals/day/wgt maintenance    Wgt Slow Reduction: 8.5 kcals/cm height = 2512-3476 kcals/day/slow wgt loss   Wgt Speeder Reduction: 7 kcals/cm height = 800-906 kcals/faster wgt loss      Est. Daily DRI Nutrition/Disease Reference Point/Standard Needs:    FLUID Needs: 2200 mL    ENERGY Needs: 8532-0744 kg    DRI PROTEIN Needs: 70 grams     Adequate Nutrient Intake/Growth and Assessment Compared to Standards/Needs:   Meeting intake compared to needs by >100%, pt continually working on goals by next nutrition reassessment as outlined in the Nutrition Plan of Care Process/Disease Standards per age/disease processes standard needs      PES/DX Statement: Excessive energy intake related to PWS as evidenced by genetic testing      NUTRITION ASSESSMENT   Lonny is a 35 yo with PWS. Pt has gained 20 lbs in 17 months due to not exercising, just wants to eat.       Nutrition Status: High nutritional risk related to obesity health complications. RD will continue to monitor, evaluate and reassess nutritional status as needed. Nutrition Plan of Care was discussed with the team      NUTRITION INTERVENTION AND PLAN:   Limit daily calories: 1200 calories   Diet Principals: higher protein, carb control, calorie restriction   Protein: 70 grams protein and 120 grams Carbs daily   Fluids: 2124 ounces daily (72 oz)  Continue Complete Multi-Vitamins with iron daily  (no gummy)   Add 1000 IU Vitamin D daily and 500 mg Calcium, twice daily   Continue to keep a daily food log to count calories   Increase Physical Activity to 60 mins daily (15 mins/4 times daily) with resistance training   Fiber Goal: 20  grams daily   Check weight at home weekly and record          Contact the dietitian with any questions or concerns with diet [phone]  Follow up in [facility] Clinic in 12 months or when recommended by PWS Team      NUTRITION MONITORING, EVALUATION AND GOALS   Nutrition related clinical history with intake compared to needs    Evaluate anthropometric measures and z-scores with previous status and reference standards    Biochemical data, medical tests and procedure findings    Nutrition Related malnutrition indicators and NFPE status      After Visit Summary: all patient/families questions were answered; plan, goals and follow-up were discussed and agreed upon with care/treatment team and patient/family.   Dietitian Follow-up: dietitian will continue to follow and reassess as needed or requested  Face-to-Face Time and Units: Time: 30 minutes/Units: 2     Felipa Weiss MS, RD, LDN l Sr. Metabolic Genetics Dietitian l Clinical Dietitian, Advance Practice l NPI: [id number] l [place] [facility]   [facility]  Genetics Dietitian's Office: [phone]; Fax: [phone]; Email: [email or web address]   Genetics/Scheduling: [phone]; Fax: [phone]; Urgent Doctor On-Call: [phone] [address] [facility] [address] [place] [address]   Clinical  Provider: Carrington Forrester MD  .

## 2024-10-30 ENCOUNTER — PHARMACY VISIT (OUTPATIENT)
Dept: PHARMACY | Facility: CLINIC | Age: 34
End: 2024-10-30
Payer: COMMERCIAL

## 2024-11-05 ENCOUNTER — APPOINTMENT (OUTPATIENT)
Dept: BEHAVIORAL HEALTH | Facility: CLINIC | Age: 34
End: 2024-11-05
Payer: MEDICAID

## 2024-11-05 VITALS
DIASTOLIC BLOOD PRESSURE: 80 MMHG | SYSTOLIC BLOOD PRESSURE: 120 MMHG | HEART RATE: 87 BPM | TEMPERATURE: 97.5 F | RESPIRATION RATE: 18 BRPM

## 2024-11-05 DIAGNOSIS — F06.31 MOOD DISORDER WITH DEPRESSIVE FEATURES DUE TO GENERAL MEDICAL CONDITION: Primary | ICD-10-CM

## 2024-11-05 ASSESSMENT — PAIN SCALES - GENERAL: PAINLEVEL_OUTOF10: 0-NO PAIN

## 2024-11-05 NOTE — PROGRESS NOTES
Patient here at the clinic today to receive his monthly Abilify Maintena 400mg for Mood disorder with depressive features      Patient identified by full name and  and vitals obtained. patient last seen by provider 24, last seen by nurse on 10/8/24 Medication verified by providers note and medication order.        Appetite: no change  Sleep: No change  Appearance: clean, age appropriate, well-groomed  Build: average  Attitude: cooperative, calm, pleasant, friendly, open  Eye Contact: normal  Activity: alert, attentive, appropriate  Speech: appropriate & spontaneous, normal  Delusions: patient denies   Thought Content: logical  Thought Process: logical  Judgement/insight: Fair  Mood: calm happy  Affect: appropriate  AH/VH/SI/HI: patient denies  Access to firearms/weapons: No  Depression: patient denies  Thoughts of hopelessness: Patient denies  Anxiety: patient denies  Self-injurious behavior: patient denies  Cravings/Urges: NA  Last use: NA  Tobacco Use: non-smoker  Spiritual or cultural practices that may affect your care or impact your health care decisions: no  Living situation lives with mom whom is his guardian   Employed: No        Patient here at the clinic today to receive his monthly Abilify Maintena for Mood disorder accompanied by his mother. Patient was cooperative and engaged during this visit. Per mom there has been no issues with previous injection and denies any issues with SI/HI, VH/AH, depression and no recent hospitalizations      Patient received injection of Abilify Maintena 400mg/2ml via 23 gauge w/o incident into left deltoid area. Patient tolerated injection well, no reaction at injection site.     RN provided education on medications sided effects, the importance of reporting any changes in the injection site, for minor arm pain utilization of ibuprofen and a warm compress should relive any discomfort.  RN has provided patient and mom direct office number for future questions and the  [place] crisis number for any emergent concerns that may arise.      LOT # [id number]  EXP:  1/1/2027  NDC: [id number]        Patient will RTC in 4 weeks     VIANNEY SiddiquiN

## 2024-11-25 ENCOUNTER — TELEPHONE (OUTPATIENT)
Dept: GENETICS | Facility: CLINIC | Age: 34
End: 2024-11-25
Payer: MEDICAID

## 2024-11-25 NOTE — TELEPHONE ENCOUNTER
Genetics note:    Called to follow up. He has not started NAC. He has not made an appointment with Neurology. Will call in 1 mo to assess the response to NAC.     Carrington Forrester MD  Medical Geneticist

## 2024-12-03 ENCOUNTER — APPOINTMENT (OUTPATIENT)
Dept: BEHAVIORAL HEALTH | Facility: CLINIC | Age: 34
End: 2024-12-03
Payer: MEDICAID

## 2024-12-03 VITALS
RESPIRATION RATE: 18 BRPM | TEMPERATURE: 97.8 F | WEIGHT: 159.8 LBS | HEART RATE: 76 BPM | BODY MASS INDEX: 33.4 KG/M2 | SYSTOLIC BLOOD PRESSURE: 119 MMHG | DIASTOLIC BLOOD PRESSURE: 77 MMHG

## 2024-12-03 DIAGNOSIS — F06.31 MOOD DISORDER WITH DEPRESSIVE FEATURES DUE TO GENERAL MEDICAL CONDITION: Primary | ICD-10-CM

## 2024-12-03 ASSESSMENT — COLUMBIA-SUICIDE SEVERITY RATING SCALE - C-SSRS
TOTAL  NUMBER OF ABORTED OR SELF INTERRUPTED ATTEMPTS LIFETIME: NO
TOTAL  NUMBER OF INTERRUPTED ATTEMPTS LIFETIME: NO
2. HAVE YOU ACTUALLY HAD ANY THOUGHTS OF KILLING YOURSELF?: NO
6. HAVE YOU EVER DONE ANYTHING, STARTED TO DO ANYTHING, OR PREPARED TO DO ANYTHING TO END YOUR LIFE?: NO
ATTEMPT LIFETIME: NO

## 2024-12-03 ASSESSMENT — PAIN SCALES - GENERAL: PAINLEVEL_OUTOF10: 0-NO PAIN

## 2024-12-03 NOTE — PROGRESS NOTES
Patient here at the clinic today to receive his monthly Abilify Maintena 400mg for Mood disorder with depressive features      Patient identified by full name and  and vitals obtained. patient last seen by provider 24, last seen by nurse on 2024 Medication verified by providers note and medication order.        Appetite: no change  Sleep: No change  Appearance: clean, age appropriate, well-groomed  Build: average  Attitude: cooperative, calm, pleasant, friendly, open  Eye Contact: normal  Activity: alert, attentive, appropriate  Speech: appropriate & spontaneous, normal  Delusions: patient denies   Thought Content: logical  Thought Process: logical  Judgement/insight: Fair  Mood: calm happy  Affect: appropriate  AH/VH/SI/HI: patient denies  Access to firearms/weapons: No  Depression: patient denies  Thoughts of hopelessness: Patient denies  Anxiety: patient denies  Self-injurious behavior: patient denies  Cravings/Urges: NA  Last use: NA  Tobacco Use: non-smoker  Spiritual or cultural practices that may affect your care or impact your health care decisions: no  Living situation lives with mom whom is his guardian   Employed: No        Patient here at the clinic today to receive his monthly Abilify Maintena for Mood disorder accompanied by his mother. Patient was cooperative and engaged during this visit. Per mom there has been no issues with previous injection and denies any issues with SI/HI, VH/AH, depression and no recent hospitalizations      Patient received injection of Abilify Maintena 400mg/2ml via 23 gauge w/o incident into right deltoid area. Patient tolerated injection well, no reaction at injection site.     RN provided education on medications sided effects, the importance of reporting any changes in the injection site, for minor arm pain utilization of ibuprofen and a warm compress should relive any discomfort.  RN has provided patient and mom direct office number for future questions and  the mobile crisis number for any emergent concerns that may arise.      LOT # [id number]  EXP:  3/27/2027  NDC: 75803-194-45        Patient will RTC in 4 weeks     VIANNEY SiddiquiN

## 2024-12-06 ENCOUNTER — TELEPHONE (OUTPATIENT)
Dept: BEHAVIORAL HEALTH | Facility: CLINIC | Age: 34
End: 2024-12-06
Payer: MEDICAID

## 2024-12-06 DIAGNOSIS — F90.2 ATTENTION DEFICIT HYPERACTIVITY DISORDER (ADHD), COMBINED TYPE: ICD-10-CM

## 2024-12-06 RX ORDER — DEXTROAMPHETAMINE SULFATE, DEXTROAMPHETAMINE SACCHARATE, AMPHETAMINE SULFATE AND AMPHETAMINE ASPARTATE 7.5; 7.5; 7.5; 7.5 MG/1; MG/1; MG/1; MG/1
30 CAPSULE, EXTENDED RELEASE ORAL EVERY MORNING
Qty: 30 CAPSULE | Refills: 0 | Status: SHIPPED | OUTPATIENT
Start: 2024-12-06 | End: 2025-01-05

## 2024-12-06 NOTE — PROGRESS NOTES
PROVIDER: Evans  MEDICATION/DOSAGE:  adderall xr 5mg  QTY/SUPPLY: 30/30  PRIOR AUTH COMPLETED VIA: faxed  INSURANCE:  [employer]  REF #/KEY: None  STATUS pending  BIN#        [id number]   ID#       [id number]

## 2024-12-19 ENCOUNTER — APPOINTMENT (OUTPATIENT)
Dept: OPHTHALMOLOGY | Facility: CLINIC | Age: 34
End: 2024-12-19
Payer: MEDICAID

## 2024-12-19 DIAGNOSIS — H40.053 OCULAR HYPERTENSION, BILATERAL: Primary | ICD-10-CM

## 2024-12-19 DIAGNOSIS — H54.7 DECREASED VISUAL ACUITY: ICD-10-CM

## 2024-12-19 DIAGNOSIS — H52.13 MYOPIA OF BOTH EYES: ICD-10-CM

## 2024-12-19 PROCEDURE — 92020 GONIOSCOPY: CPT | Performed by: STUDENT IN AN ORGANIZED HEALTH CARE EDUCATION/TRAINING PROGRAM

## 2024-12-19 PROCEDURE — 92133 CPTRZD OPH DX IMG PST SGM ON: CPT | Performed by: STUDENT IN AN ORGANIZED HEALTH CARE EDUCATION/TRAINING PROGRAM

## 2024-12-19 PROCEDURE — 92004 COMPRE OPH EXAM NEW PT 1/>: CPT | Performed by: STUDENT IN AN ORGANIZED HEALTH CARE EDUCATION/TRAINING PROGRAM

## 2024-12-19 PROCEDURE — 92015 DETERMINE REFRACTIVE STATE: CPT | Performed by: STUDENT IN AN ORGANIZED HEALTH CARE EDUCATION/TRAINING PROGRAM

## 2024-12-19 RX ORDER — LATANOPROST 50 UG/ML
1 SOLUTION/ DROPS OPHTHALMIC NIGHTLY
Qty: 7.5 ML | Refills: 3 | Status: SHIPPED | OUTPATIENT
Start: 2024-12-19 | End: 2025-03-19

## 2024-12-19 ASSESSMENT — REFRACTION_MANIFEST
OD_CYLINDER: -0.75
METHOD_AUTOREFRACTION: 1
OD_SPHERE: -0.50
OS_SPHERE: -1.50
OS_CYLINDER: -0.50
OS_AXIS: 060
OS_SPHERE: -1.00
OS_AXIS: 025
OD_AXIS: 160
OD_AXIS: 160
OD_CYLINDER: -0.75
OS_CYLINDER: -0.50
OD_SPHERE: -0.50

## 2024-12-19 ASSESSMENT — GONIOSCOPY
OS_TEMPORAL: CB
OD_INFERIOR: CB
OS_NASAL: CB
METHOD: ZEISS, FOUR MIRROR
OD_TEMPORAL: CB
OS_SUPERIOR: CB
OD_NASAL: CB
OD_SUPERIOR: CB
OS_INFERIOR: CB

## 2024-12-19 ASSESSMENT — CONF VISUAL FIELD
OS_INFERIOR_NASAL_RESTRICTION: 0
OD_SUPERIOR_TEMPORAL_RESTRICTION: 0
OS_SUPERIOR_TEMPORAL_RESTRICTION: 0
OD_SUPERIOR_NASAL_RESTRICTION: 0
OS_NORMAL: 1
OD_INFERIOR_NASAL_RESTRICTION: 0
METHOD: COUNTING FINGERS
OD_NORMAL: 1
OD_INFERIOR_TEMPORAL_RESTRICTION: 0
OS_SUPERIOR_NASAL_RESTRICTION: 0
OS_INFERIOR_TEMPORAL_RESTRICTION: 0

## 2024-12-19 ASSESSMENT — TONOMETRY
OD_IOP_MMHG: 25
OD_IOP_MMHG: 25
OS_IOP_MMHG: 28
OS_IOP_MMHG: 42
IOP_METHOD: GOLDMANN APPLANATION
IOP_METHOD: GOLDMANN APPLANATION

## 2024-12-19 ASSESSMENT — VISUAL ACUITY
OS_SC: 20/30
OD_PH_SC+: -2
OS_SC+: -1
METHOD: SNELLEN - LINEAR
OS_PH_SC: 20/25
OD_SC+: -2
OD_PH_SC: 20/25
OS_PH_SC+: -1
OD_SC: 20/40

## 2024-12-19 ASSESSMENT — ENCOUNTER SYMPTOMS
CARDIOVASCULAR NEGATIVE: 0
MUSCULOSKELETAL NEGATIVE: 0
HEMATOLOGIC/LYMPHATIC NEGATIVE: 0
EYES NEGATIVE: 1
GASTROINTESTINAL NEGATIVE: 0
NEUROLOGICAL NEGATIVE: 0
ALLERGIC/IMMUNOLOGIC NEGATIVE: 0
RESPIRATORY NEGATIVE: 0
PSYCHIATRIC NEGATIVE: 0
CONSTITUTIONAL NEGATIVE: 0
ENDOCRINE NEGATIVE: 0

## 2024-12-19 ASSESSMENT — EXTERNAL EXAM - RIGHT EYE: OD_EXAM: NORMAL

## 2024-12-19 ASSESSMENT — SLIT LAMP EXAM - LIDS
COMMENTS: NORMAL
COMMENTS: NORMAL

## 2024-12-19 ASSESSMENT — EXTERNAL EXAM - LEFT EYE: OS_EXAM: NORMAL

## 2024-12-19 ASSESSMENT — CUP TO DISC RATIO
OS_RATIO: .50
OD_RATIO: .50

## 2024-12-19 NOTE — PROGRESS NOTES
Assessment/Plan   Diagnoses and all orders for this visit:  Ocular hypertension, bilateral  -patient new to clinic today   -IOP elevated left eye today 25/42  -gonio today does not show signs of angle closure or previous angle closure attacks  -no anterior chamber reaction (-)glaucomatocyclitc crisis  -1 drop of simbrinza and 1 drop of rhopressa added in clinic today  -IOP after reduced to 28 with drops  -ONH appearance with distinct NRR  -Baseline OCT RNFL today 12/19/24 within normal limits for both eyes  -Discussed findings with patient and mother-given IOP will initiate treatment with Latanoprost QHS OU   -Tgoal OD 20 OS 26  -RTC 4 months for IOP check, HVF 24-2, and PACHS  Myopia of both eyes  -New spec rx released today per patient request. Monitor 1 year or sooner prn. Refraction billed today.    RTC 4 months for IOP check, HVF 24-2, and PACHS

## 2024-12-27 ENCOUNTER — APPOINTMENT (OUTPATIENT)
Dept: BEHAVIORAL HEALTH | Facility: CLINIC | Age: 34
End: 2024-12-27
Payer: MEDICAID

## 2024-12-27 VITALS
DIASTOLIC BLOOD PRESSURE: 77 MMHG | WEIGHT: 154.3 LBS | SYSTOLIC BLOOD PRESSURE: 125 MMHG | HEART RATE: 80 BPM | RESPIRATION RATE: 18 BRPM | BODY MASS INDEX: 32.25 KG/M2 | TEMPERATURE: 97.9 F

## 2024-12-27 DIAGNOSIS — F43.10 PTSD (POST-TRAUMATIC STRESS DISORDER): ICD-10-CM

## 2024-12-27 DIAGNOSIS — Z79.899 ENCOUNTER FOR LONG-TERM (CURRENT) USE OF HIGH-RISK MEDICATION: ICD-10-CM

## 2024-12-27 DIAGNOSIS — F90.2 ATTENTION DEFICIT HYPERACTIVITY DISORDER (ADHD), COMBINED TYPE: ICD-10-CM

## 2024-12-27 DIAGNOSIS — G47.8 POOR SLEEP PATTERN: ICD-10-CM

## 2024-12-27 DIAGNOSIS — Q87.11 PRADER-WILLI SYNDROME (HHS-HCC): ICD-10-CM

## 2024-12-27 DIAGNOSIS — F06.31 MOOD DISORDER WITH DEPRESSIVE FEATURES DUE TO GENERAL MEDICAL CONDITION: Primary | ICD-10-CM

## 2024-12-27 LAB
AMPHETAMINES UR QL SCN: ABNORMAL
BARBITURATES UR QL SCN: ABNORMAL
BENZODIAZ UR QL SCN: ABNORMAL
BZE UR QL SCN: ABNORMAL
CANNABINOIDS UR QL SCN: ABNORMAL
FENTANYL+NORFENTANYL UR QL SCN: ABNORMAL
METHADONE UR QL SCN: ABNORMAL
OPIATES UR QL SCN: ABNORMAL
OXYCODONE+OXYMORPHONE UR QL SCN: ABNORMAL
PCP UR QL SCN: ABNORMAL

## 2024-12-27 PROCEDURE — 80307 DRUG TEST PRSMV CHEM ANLYZR: CPT

## 2024-12-27 PROCEDURE — 99215 OFFICE O/P EST HI 40 MIN: CPT | Performed by: NURSE PRACTITIONER

## 2024-12-27 RX ORDER — DEXTROAMPHETAMINE SULFATE, DEXTROAMPHETAMINE SACCHARATE, AMPHETAMINE SULFATE AND AMPHETAMINE ASPARTATE 1.25; 1.25; 1.25; 1.25 MG/1; MG/1; MG/1; MG/1
5 CAPSULE, EXTENDED RELEASE ORAL
Qty: 30 CAPSULE | Refills: 0 | Status: SHIPPED | OUTPATIENT
Start: 2025-01-26 | End: 2025-02-25

## 2024-12-27 RX ORDER — DEXTROAMPHETAMINE SULFATE, DEXTROAMPHETAMINE SACCHARATE, AMPHETAMINE SULFATE AND AMPHETAMINE ASPARTATE 7.5; 7.5; 7.5; 7.5 MG/1; MG/1; MG/1; MG/1
30 CAPSULE, EXTENDED RELEASE ORAL EVERY MORNING
Qty: 30 CAPSULE | Refills: 0 | Status: SHIPPED | OUTPATIENT
Start: 2024-12-27 | End: 2025-01-26

## 2024-12-27 RX ORDER — DEXTROAMPHETAMINE SULFATE, DEXTROAMPHETAMINE SACCHARATE, AMPHETAMINE SULFATE AND AMPHETAMINE ASPARTATE 7.5; 7.5; 7.5; 7.5 MG/1; MG/1; MG/1; MG/1
30 CAPSULE, EXTENDED RELEASE ORAL EVERY MORNING
Qty: 30 CAPSULE | Refills: 0 | Status: SHIPPED | OUTPATIENT
Start: 2025-02-25 | End: 2025-03-27

## 2024-12-27 RX ORDER — SERTRALINE HYDROCHLORIDE 50 MG/1
50 TABLET, FILM COATED ORAL DAILY
Qty: 30 TABLET | Refills: 3 | Status: SHIPPED | OUTPATIENT
Start: 2024-12-27 | End: 2025-04-26

## 2024-12-27 RX ORDER — DEXTROAMPHETAMINE SULFATE, DEXTROAMPHETAMINE SACCHARATE, AMPHETAMINE SULFATE AND AMPHETAMINE ASPARTATE 7.5; 7.5; 7.5; 7.5 MG/1; MG/1; MG/1; MG/1
30 CAPSULE, EXTENDED RELEASE ORAL EVERY MORNING
Qty: 30 CAPSULE | Refills: 0 | Status: SHIPPED | OUTPATIENT
Start: 2025-01-26 | End: 2025-02-25

## 2024-12-27 RX ORDER — ARIPIPRAZOLE 400 MG
400 KIT INTRAMUSCULAR
Qty: 1 EACH | Refills: 3 | Status: SHIPPED | OUTPATIENT
Start: 2024-12-27

## 2024-12-27 RX ORDER — SERTRALINE HYDROCHLORIDE 100 MG/1
200 TABLET, FILM COATED ORAL DAILY
Qty: 60 TABLET | Refills: 3 | Status: SHIPPED | OUTPATIENT
Start: 2024-12-27 | End: 2025-04-26

## 2024-12-27 RX ORDER — DEXTROAMPHETAMINE SULFATE, DEXTROAMPHETAMINE SACCHARATE, AMPHETAMINE SULFATE AND AMPHETAMINE ASPARTATE 1.25; 1.25; 1.25; 1.25 MG/1; MG/1; MG/1; MG/1
5 CAPSULE, EXTENDED RELEASE ORAL
Qty: 30 CAPSULE | Refills: 0 | Status: SHIPPED | OUTPATIENT
Start: 2024-12-27 | End: 2025-01-26

## 2024-12-27 RX ORDER — DEXTROAMPHETAMINE SULFATE, DEXTROAMPHETAMINE SACCHARATE, AMPHETAMINE SULFATE AND AMPHETAMINE ASPARTATE 1.25; 1.25; 1.25; 1.25 MG/1; MG/1; MG/1; MG/1
5 CAPSULE, EXTENDED RELEASE ORAL
Qty: 30 CAPSULE | Refills: 0 | Status: SHIPPED | OUTPATIENT
Start: 2025-02-25 | End: 2025-03-27

## 2024-12-27 RX ORDER — PRAZOSIN HYDROCHLORIDE 2 MG/1
4 CAPSULE ORAL NIGHTLY
Qty: 60 CAPSULE | Refills: 3 | Status: SHIPPED | OUTPATIENT
Start: 2024-12-27

## 2024-12-27 ASSESSMENT — PAIN SCALES - GENERAL: PAINLEVEL_OUTOF10: 0-NO PAIN

## 2024-12-27 NOTE — PATIENT INSTRUCTIONS
ASSESSMENT: Mr. Andrews presents at baseline mental health wise.  See treatment plan below.    PLAN:           problems treated   f/u requested to prevent relapse   medications renewed/re-ordered    1. Continue Adderall XR 5 mg by mouth at noon as booster for ADHD. Not able to get name brand in IR. Therefore, XR ordered.  2. Continue Adderall extended release 30 mg by mouth daily for ADHD- must be name brand. I have personally reviewed the OARRS report 12/27/24. I have considered the risks of abuse, dependence, addiction and diversion.  Stim agreement signed 7/3/2024  Urine amphetamine and drug screen 12/27/24 in office   3. Continue sertraline (Zoloft) 250 mg by mouth daily for PTSD and skin excoriation  4. Continue Prazosin 4 mg by mouth at bedtime for nightmares related to PTSD.   5. Continue Abilify Maintena 400 mg IM Q 4 weeks for moods   6. Risks, benefits, alternatives, off-label uses, and side effects of medications have been discussed with patient/caregiver. There is no report of signs/symptoms consistent with medication-induced impairment in daily functioning. At this time, benefits of medication felt to outweigh potential risks. Will continue to reassess need for psychotropic medication at regular 3 month intervals.  7. Return to clinic 3 months for an in-person appointment or earlier if needed. Call [phone] to reschedule.  8. Mom will obtain pharmacogenomics testing (PGT) results from previous MH & scan to [email or web address] or bring to office     Thank you for seeing me today. If you have any questions, do not hesitate to contact my office.  Gwendolyn Anthony    TREATMENT TYPE         counseling and coordination of care; addressing signs and symptoms of illness; risks/benefits and side effects of medications; and behavioral approaches to illness.  This note was created using electronic dictation. There may be errors in syntax and meaning. Please contact the office with any questions.
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
## 2024-12-30 ENCOUNTER — APPOINTMENT (OUTPATIENT)
Dept: BEHAVIORAL HEALTH | Facility: CLINIC | Age: 34
End: 2024-12-30
Payer: MEDICAID

## 2024-12-30 ENCOUNTER — TELEPHONE (OUTPATIENT)
Dept: GENETICS | Facility: CLINIC | Age: 34
End: 2024-12-30

## 2024-12-30 VITALS
RESPIRATION RATE: 18 BRPM | HEART RATE: 73 BPM | SYSTOLIC BLOOD PRESSURE: 100 MMHG | DIASTOLIC BLOOD PRESSURE: 64 MMHG | TEMPERATURE: 97.8 F

## 2024-12-30 DIAGNOSIS — F06.31 MOOD DISORDER WITH DEPRESSIVE FEATURES DUE TO GENERAL MEDICAL CONDITION: Primary | ICD-10-CM

## 2024-12-30 ASSESSMENT — PAIN SCALES - GENERAL: PAINLEVEL_OUTOF10: 0-NO PAIN

## 2024-12-30 NOTE — TELEPHONE ENCOUNTER
Called mom and discussed. He has been taking NAC 600mg for about 2 weeks. No significant changes in skin picking. Will increase the dose to 600mg BID and call mom in 1 mo.     Carrington Forrester MD  Medical Geneticist

## 2024-12-30 NOTE — PROGRESS NOTES
Patient here at the clinic today to receive his monthly Abilify Maintena 400mg for Mood disorder with depressive features      Patient identified by full name and  and vitals obtained. patient last seen by provider 24 , last seen by nurse on 12/3/24 Medication verified by providers note and medication order   Appetite: no change  Sleep: No change  Appearance: clean, age appropriate, well-groomed  Build: average  Attitude: cooperative, calm, pleasant, friendly, open  Eye Contact: normal  Activity: alert, attentive, appropriate  Speech: appropriate & spontaneous, normal  Delusions: patient denies   Thought Content: logical  Thought Process: logical  Judgement/insight: Fair  Mood: calm happy  Affect: appropriate  AH/VH/SI/HI: patient denies  Access to firearms/weapons: No  Depression: patient denies  Thoughts of hopelessness: Patient denies  Anxiety: patient denies  Self-injurious behavior: patient denies  Cravings/Urges: NA  Last use: NA  Tobacco Use: non-smoker  Spiritual or cultural practices that may affect your care or impact your health care decisions: no  Living situation lives with mom whom is his guardian   Employed: No        Patient here at the clinic today to receive his monthly Abilify Maintena for Mood disorder accompanied by his mother. Patient was cooperative and engaged during this visit. Per mom there has been no issues with previous injection and denies any issues with SI/HI, VH/AH, depression and no recent hospitalizations      Patient received injection of Abilify Maintena 400mg/2ml via 23 gauge w/o incident into left deltoid area. Patient tolerated injection well, no reaction at injection site.     RN provided education on medications sided effects, the importance of reporting any changes in the injection site, for minor arm pain utilization of ibuprofen and a warm compress should relive any discomfort.  RN has provided patient and mom direct office number for future questions and the  [place] crisis number for any emergent concerns that may arise.      LOT # [id number]  EXP:  4/1/2027  NDC: 00585-959-17        Patient will RTC in 4 weeks     VIANNEY SiddiquiN          SSKI Counseling:  I discussed with the patient the risks of SSKI including but not limited to thyroid abnormalities, metallic taste, GI upset, fever, headache, acne, arthralgias, paraesthesias, lymphadenopathy, easy bleeding, arrhythmias, and allergic reaction.

## 2024-12-31 LAB
AMPHET UR-MCNC: >5000 NG/ML
MDA UR-MCNC: <200 NG/ML
MDEA UR-MCNC: <200 NG/ML
MDMA UR-MCNC: <200 NG/ML
METHAMPHET UR-MCNC: <200 NG/ML
PHENTERMINE UR CFM-MCNC: <200 NG/ML

## 2025-01-09 PROCEDURE — RXMED WILLOW AMBULATORY MEDICATION CHARGE

## 2025-01-16 ENCOUNTER — OFFICE VISIT (OUTPATIENT)
Dept: URGENT CARE | Age: 35
End: 2025-01-16
Payer: MEDICAID

## 2025-01-16 VITALS
WEIGHT: 154 LBS | TEMPERATURE: 97.7 F | RESPIRATION RATE: 19 BRPM | BODY MASS INDEX: 32.19 KG/M2 | SYSTOLIC BLOOD PRESSURE: 110 MMHG | DIASTOLIC BLOOD PRESSURE: 75 MMHG | HEART RATE: 65 BPM | OXYGEN SATURATION: 98 %

## 2025-01-16 DIAGNOSIS — H10.9 CONJUNCTIVITIS OF LEFT EYE, UNSPECIFIED CONJUNCTIVITIS TYPE: Primary | ICD-10-CM

## 2025-01-16 DIAGNOSIS — R05.1 ACUTE COUGH: ICD-10-CM

## 2025-01-16 DIAGNOSIS — J06.9 VIRAL UPPER RESPIRATORY TRACT INFECTION: ICD-10-CM

## 2025-01-16 LAB
POC RAPID INFLUENZA A: NEGATIVE
POC RAPID INFLUENZA B: NEGATIVE
POC SARS-COV-2 AG BINAX: NORMAL

## 2025-01-16 PROCEDURE — 1036F TOBACCO NON-USER: CPT | Performed by: FAMILY MEDICINE

## 2025-01-16 PROCEDURE — 87811 SARS-COV-2 COVID19 W/OPTIC: CPT | Performed by: FAMILY MEDICINE

## 2025-01-16 PROCEDURE — 87804 INFLUENZA ASSAY W/OPTIC: CPT | Performed by: FAMILY MEDICINE

## 2025-01-16 PROCEDURE — 99213 OFFICE O/P EST LOW 20 MIN: CPT | Performed by: FAMILY MEDICINE

## 2025-01-16 RX ORDER — GUAIFENESIN 600 MG/1
600 TABLET, EXTENDED RELEASE ORAL 2 TIMES DAILY
Qty: 10 TABLET | Refills: 0 | Status: SHIPPED | OUTPATIENT
Start: 2025-01-16 | End: 2025-01-21

## 2025-01-16 RX ORDER — BENZONATATE 200 MG/1
200 CAPSULE ORAL 3 TIMES DAILY PRN
Qty: 21 CAPSULE | Refills: 0 | Status: SHIPPED | OUTPATIENT
Start: 2025-01-16 | End: 2025-01-23

## 2025-01-16 RX ORDER — TOBRAMYCIN 3 MG/ML
1 SOLUTION/ DROPS OPHTHALMIC EVERY 4 HOURS
Qty: 5 ML | Refills: 0 | Status: SHIPPED | OUTPATIENT
Start: 2025-01-16 | End: 2025-01-21

## 2025-01-16 ASSESSMENT — ENCOUNTER SYMPTOMS
VOMITING: 0
EYE REDNESS: 1
HEADACHES: 0
DIZZINESS: 0
NAUSEA: 0
FEVER: 0
EYE DISCHARGE: 1
CHILLS: 0
PALPITATIONS: 0
CONFUSION: 0
RHINORRHEA: 1
APNEA: 0
CHEST TIGHTNESS: 0
ARTHRALGIAS: 0
WHEEZING: 0
CHOKING: 0
DIARRHEA: 0
COUGH: 1
SHORTNESS OF BREATH: 0
SINUS PRESSURE: 0
ADENOPATHY: 0

## 2025-01-16 NOTE — PROGRESS NOTES
Subjective   Patient ID: Lonny Andrews is a 34 y.o. male. They present today with a chief complaint of Conjunctivitis (Patient here with possible pink eye for 3 days with a cough ) and Cough.    History of Present Illness  34-year-old with Prader Willi Syndrome with history of prematurity with his mom for left eye redness, mucopurulent discharge, nasal congestion and discharge, coughing, no wheezing no chest pain or shortness of breath no fever.  Exposure to nephew with cold symptoms and pinkeye. Graf not attend day programming.       History provided by:  Patient and parent   used: No    Conjunctivitis  Associated symptoms: congestion, cough and rhinorrhea    Associated symptoms: no chest pain, no diarrhea, no fever, no headaches, no nausea, no shortness of breath, no vomiting and no wheezing    Cough  Associated symptoms include eye redness and rhinorrhea. Pertinent negatives include no chest pain, chills, fever, headaches, shortness of breath or wheezing.       Past Medical History  Allergies as of 01/16/2025 - Reviewed 01/16/2025   Allergen Reaction Noted    Clonazepam Unknown 08/26/2023    Dextroamphetamine-amphetamine Unknown 08/26/2023    Ziprasidone hcl Other and Hallucinations 08/26/2023       (Not in a hospital admission)       Past Medical History:   Diagnosis Date    Decreased white blood cell count, unspecified 09/20/2021    Leukopenia    Encounter for immunization 07/19/2016    Diphtheria-tetanus-pertussis (DTP) vaccination    Personal history of other infectious and parasitic diseases 09/29/2020    History of herpes simplex infection       History reviewed. No pertinent surgical history.     reports that he has never smoked. He has never used smokeless tobacco. He reports that he does not drink alcohol and does not use drugs.    Review of Systems  Review of Systems   Constitutional:  Negative for chills and fever.   HENT:  Positive for congestion and rhinorrhea. Negative for  sinus pressure.    Eyes:  Positive for discharge and redness.   Respiratory:  Positive for cough. Negative for apnea, choking, chest tightness, shortness of breath and wheezing.    Cardiovascular:  Negative for chest pain and palpitations.   Gastrointestinal:  Negative for diarrhea, nausea and vomiting.   Musculoskeletal:  Negative for arthralgias.   Neurological:  Negative for dizziness and headaches.   Hematological:  Negative for adenopathy.   Psychiatric/Behavioral:  Negative for confusion.                                   Objective    Vitals:    01/16/25 1558   BP: 110/75   BP Location: Left arm   Patient Position: Sitting   Pulse: 65   Resp: 19   Temp: 36.5 °C (97.7 °F)   TempSrc: Oral   SpO2: 98%   Weight: 69.9 kg (154 lb)     No LMP for male patient.    Physical Exam  Vitals and nursing note reviewed.   Constitutional:       General: He is not in acute distress.     Appearance: Normal appearance. He is not ill-appearing, toxic-appearing or diaphoretic.   HENT:      Right Ear: Tympanic membrane normal.      Left Ear: Tympanic membrane normal.      Nose: Congestion present. No rhinorrhea.      Mouth/Throat:      Pharynx: No oropharyngeal exudate or posterior oropharyngeal erythema.   Eyes:      General:         Left eye: Discharge (conjunctival injection and mild mucopurulent discharge left eye) present.  Cardiovascular:      Rate and Rhythm: Normal rate and regular rhythm.      Pulses: Normal pulses.      Heart sounds: Normal heart sounds.   Pulmonary:      Effort: Pulmonary effort is normal. No respiratory distress.      Breath sounds: Normal breath sounds. No wheezing or rales.   Lymphadenopathy:      Cervical: No cervical adenopathy.   Neurological:      General: No focal deficit present.      Mental Status: He is alert.   Psychiatric:         Mood and Affect: Mood normal.         Behavior: Behavior normal.         Procedures    Point of Care Test & Imaging Results from this visit  Results for orders  placed or performed in visit on 01/16/25   POCT Covid-19 Rapid Antigen   Result Value Ref Range    POC ELISHA-COV-2 AG  Presumptive negative test for SARS-CoV-2 (no antigen detected)     Presumptive negative test for SARS-CoV-2 (no antigen detected)   POCT Influenza A/B manually resulted   Result Value Ref Range    POC Rapid Influenza A Negative Negative    POC Rapid Influenza B Negative Negative      No results found.    Diagnostic study results (if any) were reviewed by Naye Oliveira.    Assessment/Plan   Allergies, medications, history, and pertinent labs/EKGs/Imaging reviewed by Naye Oliveira.     Medical Decision Making  34-year-old with acute viral illness causing conjunctivitis, cough, treatment with topical drops, Mucinex, Tessalon Perle.  Negative rapid flu and COVID.  If new or additional symptoms please return for evaluation or follow-up with PCP    Orders and Diagnoses  Diagnoses and all orders for this visit:  Conjunctivitis of left eye, unspecified conjunctivitis type  -     tobramycin (Tobrex) 0.3 % ophthalmic solution; Administer 1 drop into the left eye every 4 hours for 5 days.  Viral upper respiratory tract infection  -     guaiFENesin (Mucinex) 600 mg 12 hr tablet; Take 1 tablet (600 mg) by mouth 2 times a day for 5 days. Do not crush, chew, or split.  -     POCT Covid-19 Rapid Antigen  -     POCT Influenza A/B manually resulted  Acute cough  -     benzonatate (Tessalon) 200 mg capsule; Take 1 capsule (200 mg) by mouth 3 times a day as needed for cough for up to 7 days. Do not crush or chew.      Medical Admin Record      Patient disposition: Home    Electronically signed by Naye Oliveira  6:30 PM

## 2025-01-16 NOTE — PATIENT INSTRUCTIONS
34-year-old with acute viral illness causing conjunctivitis, cough, treatment with topical drops, Mucinex, Tessalon Perle.  Negative rapid flu and COVID.  If new or additional symptoms please return for evaluation or follow-up with PCP

## 2025-01-17 ENCOUNTER — PHARMACY VISIT (OUTPATIENT)
Dept: PHARMACY | Facility: CLINIC | Age: 35
End: 2025-01-17
Payer: COMMERCIAL

## 2025-01-24 ENCOUNTER — APPOINTMENT (OUTPATIENT)
Dept: BEHAVIORAL HEALTH | Facility: CLINIC | Age: 35
End: 2025-01-24
Payer: MEDICAID

## 2025-01-24 VITALS
WEIGHT: 143.5 LBS | TEMPERATURE: 96.8 F | RESPIRATION RATE: 18 BRPM | DIASTOLIC BLOOD PRESSURE: 60 MMHG | SYSTOLIC BLOOD PRESSURE: 103 MMHG | BODY MASS INDEX: 29.99 KG/M2 | HEART RATE: 77 BPM

## 2025-01-24 DIAGNOSIS — F90.2 ATTENTION DEFICIT HYPERACTIVITY DISORDER (ADHD), COMBINED TYPE: ICD-10-CM

## 2025-01-24 DIAGNOSIS — F43.10 PTSD (POST-TRAUMATIC STRESS DISORDER): ICD-10-CM

## 2025-01-24 DIAGNOSIS — F06.31 MOOD DISORDER WITH DEPRESSIVE FEATURES DUE TO GENERAL MEDICAL CONDITION: ICD-10-CM

## 2025-01-24 PROCEDURE — 99214 OFFICE O/P EST MOD 30 MIN: CPT | Performed by: NURSE PRACTITIONER

## 2025-01-24 RX ORDER — SERTRALINE HYDROCHLORIDE 50 MG/1
50 TABLET, FILM COATED ORAL DAILY
Qty: 30 TABLET | Refills: 3 | Status: SHIPPED | OUTPATIENT
Start: 2025-01-24 | End: 2025-05-24

## 2025-01-24 RX ORDER — SERTRALINE HYDROCHLORIDE 100 MG/1
200 TABLET, FILM COATED ORAL DAILY
Qty: 60 TABLET | Refills: 3 | Status: SHIPPED | OUTPATIENT
Start: 2025-01-24 | End: 2025-05-24

## 2025-01-24 RX ORDER — DEXTROAMPHETAMINE SULFATE, DEXTROAMPHETAMINE SACCHARATE, AMPHETAMINE SULFATE AND AMPHETAMINE ASPARTATE 1.25; 1.25; 1.25; 1.25 MG/1; MG/1; MG/1; MG/1
5 CAPSULE, EXTENDED RELEASE ORAL EVERY MORNING
Qty: 30 CAPSULE | Refills: 0 | Status: SHIPPED | OUTPATIENT
Start: 2025-02-23 | End: 2025-03-25

## 2025-01-24 RX ORDER — DEXTROAMPHETAMINE SULFATE, DEXTROAMPHETAMINE SACCHARATE, AMPHETAMINE SULFATE AND AMPHETAMINE ASPARTATE 7.5; 7.5; 7.5; 7.5 MG/1; MG/1; MG/1; MG/1
30 CAPSULE, EXTENDED RELEASE ORAL EVERY MORNING
Qty: 30 CAPSULE | Refills: 0 | Status: SHIPPED | OUTPATIENT
Start: 2025-03-25 | End: 2025-04-24

## 2025-01-24 RX ORDER — DEXTROAMPHETAMINE SULFATE, DEXTROAMPHETAMINE SACCHARATE, AMPHETAMINE SULFATE AND AMPHETAMINE ASPARTATE 1.25; 1.25; 1.25; 1.25 MG/1; MG/1; MG/1; MG/1
5 CAPSULE, EXTENDED RELEASE ORAL EVERY MORNING
Qty: 30 CAPSULE | Refills: 0 | Status: SHIPPED | OUTPATIENT
Start: 2025-03-25 | End: 2025-04-24

## 2025-01-24 RX ORDER — PRAZOSIN HYDROCHLORIDE 2 MG/1
4 CAPSULE ORAL NIGHTLY
Qty: 60 CAPSULE | Refills: 3 | Status: SHIPPED | OUTPATIENT
Start: 2025-01-24

## 2025-01-24 RX ORDER — DEXTROAMPHETAMINE SULFATE, DEXTROAMPHETAMINE SACCHARATE, AMPHETAMINE SULFATE AND AMPHETAMINE ASPARTATE 7.5; 7.5; 7.5; 7.5 MG/1; MG/1; MG/1; MG/1
30 CAPSULE, EXTENDED RELEASE ORAL EVERY MORNING
Qty: 30 CAPSULE | Refills: 0 | Status: SHIPPED | OUTPATIENT
Start: 2025-01-24 | End: 2025-02-23

## 2025-01-24 RX ORDER — DEXTROAMPHETAMINE SULFATE, DEXTROAMPHETAMINE SACCHARATE, AMPHETAMINE SULFATE AND AMPHETAMINE ASPARTATE 7.5; 7.5; 7.5; 7.5 MG/1; MG/1; MG/1; MG/1
30 CAPSULE, EXTENDED RELEASE ORAL EVERY MORNING
Qty: 30 CAPSULE | Refills: 0 | Status: SHIPPED | OUTPATIENT
Start: 2025-02-23 | End: 2025-03-25

## 2025-01-24 RX ORDER — DEXTROAMPHETAMINE SULFATE, DEXTROAMPHETAMINE SACCHARATE, AMPHETAMINE SULFATE AND AMPHETAMINE ASPARTATE 1.25; 1.25; 1.25; 1.25 MG/1; MG/1; MG/1; MG/1
5 CAPSULE, EXTENDED RELEASE ORAL EVERY MORNING
Qty: 30 CAPSULE | Refills: 0 | Status: SHIPPED | OUTPATIENT
Start: 2025-01-24 | End: 2025-02-23

## 2025-01-24 RX ORDER — ARIPIPRAZOLE 400 MG
400 KIT INTRAMUSCULAR
Qty: 1 EACH | Refills: 3 | Status: SHIPPED | OUTPATIENT
Start: 2025-01-24

## 2025-01-24 ASSESSMENT — COLUMBIA-SUICIDE SEVERITY RATING SCALE - C-SSRS
1. HAVE YOU WISHED YOU WERE DEAD OR WISHED YOU COULD GO TO SLEEP AND NOT WAKE UP?: NO
2. HAVE YOU ACTUALLY HAD ANY THOUGHTS OF KILLING YOURSELF?: NO
TOTAL  NUMBER OF INTERRUPTED ATTEMPTS SINCE LAST CONTACT: NO
TOTAL  NUMBER OF ABORTED OR SELF INTERRUPTED ATTEMPTS LIFETIME: NO
TOTAL  NUMBER OF ABORTED OR SELF INTERRUPTED ATTEMPTS SINCE LAST CONTACT: NO
1. SINCE LAST CONTACT, HAVE YOU WISHED YOU WERE DEAD OR WISHED YOU COULD GO TO SLEEP AND NOT WAKE UP?: NO
2. HAVE YOU ACTUALLY HAD ANY THOUGHTS OF KILLING YOURSELF?: NO
SUICIDE, SINCE LAST CONTACT: NO
TOTAL  NUMBER OF INTERRUPTED ATTEMPTS LIFETIME: NO
ATTEMPT LIFETIME: NO
6. HAVE YOU EVER DONE ANYTHING, STARTED TO DO ANYTHING, OR PREPARED TO DO ANYTHING TO END YOUR LIFE?: NO
ATTEMPT SINCE LAST CONTACT: NO
6. HAVE YOU EVER DONE ANYTHING, STARTED TO DO ANYTHING, OR PREPARED TO DO ANYTHING TO END YOUR LIFE?: NO

## 2025-01-24 ASSESSMENT — PAIN SCALES - GENERAL: PAINLEVEL_OUTOF10: 0-NO PAIN

## 2025-01-24 NOTE — PATIENT INSTRUCTIONS
Patient Discussion/Summary  ASSESSMENT: Mr. Andrews presents at baseline mental health wise.  Requesting 3 month motivation of daily exercising to return to lead clinic meeting in exercise.  YouTube channel [employer]  See treatment plan below.    PLAN:           problems treated   f/u requested to prevent relapse   medications renewed/re-ordered    1. Continue Adderall XR 5 mg by mouth at noon as booster for ADHD. Not able to get name brand in IR. Therefore, XR ordered.  2. Continue Adderall XR 30 mg by mouth daily for ADHD- must be name brand. I have personally reviewed the OARRS report 12/27/24. I have considered the risks of abuse, dependence, addiction and diversion.  Stim agreement signed 7/3/2024  Urine amphetamine and drug screen 12/27/24 in office   3. Continue sertraline (Zoloft) 250 mg by mouth daily for PTSD and skin excoriation  4. Continue Prazosin 4 mg by mouth at bedtime for nightmares related to PTSD.   5. Continue Abilify Maintena 400 mg IM Q 4 weeks for moods   6. Risks, benefits, alternatives, off-label uses, and side effects of medications have been discussed with patient/caregiver. There is no report of signs/symptoms consistent with medication-induced impairment in daily functioning. At this time, benefits of medication felt to outweigh potential risks. Will continue to reassess need for psychotropic medication at regular 3 month intervals.  7. Return to clinic 3 months for an in-person appointment or earlier if needed. Call [phone] to reschedule.  8. Mom will obtain pharmacogenomics testing (PGT) results from previous MH & scan to [email or web address] or bring to office     Thank you for seeing me today. If you have any questions, do not hesitate to contact my office.  Gwendolyn Anthony    TREATMENT TYPE         counseling and coordination of care; addressing signs and symptoms of illness; risks/benefits and side effects of medications; and behavioral approaches to  illness.  This note was created using electronic dictation. There may be errors in syntax and meaning. Please contact the office with any questions.
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
## 2025-01-27 ENCOUNTER — TELEPHONE (OUTPATIENT)
Dept: GENETICS | Facility: CLINIC | Age: 35
End: 2025-01-27
Payer: MEDICAID

## 2025-01-27 NOTE — TELEPHONE ENCOUNTER
Genetics note:    No change in skin picking. Will increase the dose to 1200mg AM and 600mg PM. Mother reports some increasing nightmares and urinary incontinence. There is no recent change in other meds. These two symptoms are not a known side effect of NAC that I'm aware of; it is a relatively safe medication. We could stop the medication and see if symptoms improve. We could continue the medication and observe, as these two symptoms are not new to him. Mother would like to continue the drug for now. Will call in 2 weeks.     Carrington Forrester MD  Medical Geneticist

## 2025-01-30 ENCOUNTER — APPOINTMENT (OUTPATIENT)
Dept: BEHAVIORAL HEALTH | Facility: CLINIC | Age: 35
End: 2025-01-30
Payer: MEDICAID

## 2025-01-30 VITALS
DIASTOLIC BLOOD PRESSURE: 61 MMHG | TEMPERATURE: 97.9 F | WEIGHT: 146.3 LBS | RESPIRATION RATE: 16 BRPM | HEART RATE: 68 BPM | BODY MASS INDEX: 30.58 KG/M2 | SYSTOLIC BLOOD PRESSURE: 103 MMHG

## 2025-01-30 DIAGNOSIS — F06.31 MOOD DISORDER WITH DEPRESSIVE FEATURES DUE TO GENERAL MEDICAL CONDITION: Primary | ICD-10-CM

## 2025-01-30 ASSESSMENT — PAIN SCALES - GENERAL: PAINLEVEL_OUTOF10: 0-NO PAIN

## 2025-01-30 NOTE — PROGRESS NOTES
Patient here at the clinic today to receive his monthly Abilify Maintena 400mg for Mood disorder with depressive features      Patient identified by full name and  and vitals obtained. patient last seen by provider 25 , last seen by nurse on 24  Medication verified by providers note and medication order       Appetite: no change  Sleep: No change  Appearance: clean, age appropriate, well-groomed  Build: average  Attitude: cooperative, calm, pleasant, friendly, open  Eye Contact: normal  Activity: alert, attentive, appropriate  Speech: appropriate & spontaneous, normal  Delusions: patient denies   Thought Content: logical  Thought Process: logical  Judgement/insight: Fair  Mood: calm happy  Affect: appropriate  AH/VH/SI/HI: patient denies  Access to firearms/weapons: No  Depression: patient denies  Thoughts of hopelessness: Patient denies  Anxiety: patient denies  Self-injurious behavior: patient denies  Cravings/Urges: NA  Last use: NA  Tobacco Use: non-smoker  Spiritual or cultural practices that may affect your care or impact your health care decisions: no  Living situation lives with mom whom is his guardian   Employed: No        Patient here at the clinic today to receive his monthly Abilify Maintena for Mood disorder accompanied by his mother. Patient was cooperative and engaged during this visit. Per mom there has been no issues with previous injection and denies any issues with SI/HI, VH/AH, depression and no recent hospitalizations      Patient received injection of Abilify Maintena 400mg/2ml via 23 gauge w/o incident into right   deltoid area. Patient tolerated injection well, no reaction at injection site.     RN provided education on medications sided effects, the importance of reporting any changes in the injection site, for minor arm pain utilization of ibuprofen and a warm compress should relive any discomfort.  RN has provided patient and mom direct office number for future questions and  the mobile crisis number for any emergent concerns that may arise.      LOT # [id number]  EXP:  MAY 2027  NDC: 32098-291-58        Patient will RTC in 4 weeks     VIANNEY SiddiquiN

## 2025-02-01 ENCOUNTER — HOSPITAL ENCOUNTER (EMERGENCY)
Facility: HOSPITAL | Age: 35
Discharge: HOME | End: 2025-02-01
Payer: MEDICAID

## 2025-02-01 VITALS
TEMPERATURE: 98.4 F | HEIGHT: 60 IN | OXYGEN SATURATION: 100 % | BODY MASS INDEX: 28.72 KG/M2 | WEIGHT: 146.3 LBS | DIASTOLIC BLOOD PRESSURE: 66 MMHG | SYSTOLIC BLOOD PRESSURE: 129 MMHG | HEART RATE: 67 BPM | RESPIRATION RATE: 18 BRPM
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
## 2025-02-01 PROCEDURE — 99283 EMERGENCY DEPT VISIT LOW MDM: CPT

## 2025-02-01 PROCEDURE — 2500000001 HC RX 250 WO HCPCS SELF ADMINISTERED DRUGS (ALT 637 FOR MEDICARE OP): Performed by: PHYSICIAN ASSISTANT

## 2025-02-01 RX ORDER — AMOXICILLIN AND CLAVULANATE POTASSIUM 875; 125 MG/1; MG/1
1 TABLET, FILM COATED ORAL ONCE
Status: COMPLETED | OUTPATIENT
Start: 2025-02-01 | End: 2025-02-01

## 2025-02-01 RX ORDER — AMOXICILLIN AND CLAVULANATE POTASSIUM 875; 125 MG/1; MG/1
1 TABLET, FILM COATED ORAL 2 TIMES DAILY
Qty: 20 TABLET | Refills: 0 | Status: SHIPPED | OUTPATIENT
Start: 2025-02-01 | End: 2025-02-11

## 2025-02-01 RX ADMIN — AMOXICILLIN AND CLAVULANATE POTASSIUM 1 TABLET: 875; 125 TABLET, FILM COATED ORAL at 17:04

## 2025-02-01 ASSESSMENT — PAIN DESCRIPTION - PAIN TYPE: TYPE: ACUTE PAIN

## 2025-02-01 ASSESSMENT — COLUMBIA-SUICIDE SEVERITY RATING SCALE - C-SSRS
6. HAVE YOU EVER DONE ANYTHING, STARTED TO DO ANYTHING, OR PREPARED TO DO ANYTHING TO END YOUR LIFE?: NO
2. HAVE YOU ACTUALLY HAD ANY THOUGHTS OF KILLING YOURSELF?: NO
1. IN THE PAST MONTH, HAVE YOU WISHED YOU WERE DEAD OR WISHED YOU COULD GO TO SLEEP AND NOT WAKE UP?: NO

## 2025-02-01 ASSESSMENT — PAIN SCALES - GENERAL: PAINLEVEL_OUTOF10: 10 - WORST POSSIBLE PAIN

## 2025-02-01 ASSESSMENT — PAIN DESCRIPTION - LOCATION: LOCATION: OTHER (COMMENT)

## 2025-02-01 ASSESSMENT — PAIN - FUNCTIONAL ASSESSMENT: PAIN_FUNCTIONAL_ASSESSMENT: 0-10

## 2025-02-01 NOTE — ED PROVIDER NOTES
HPI     CC: Animal Bite     HPI: Lonny Andrews is a 34 y.o. male with past medical history of Prader-Willi syndrome, ADHD, PTSD, skin picking, mood disorder, oppositional defiant disorder, and self injures behavior presents with mother with concern for dog bite.  Mom reports that the patient was scratched by their own dog.  She recently rescued the dog and the dog is attached to her.  The patient was yelling due to the above past medical history and the dog became provoked and scratched him on the right earlobe, abdomen, and back.  This occurred just a few hours prior to arrival.  They did not cleanse the wounds at the time.  Mom immediately called animal control and the dog was taken away and she has 2 young children in the home.  Dog is up-to-date on immunizations.  Patient is up-to-date on tetanus.    ROS: 10-point review of systems was performed and is otherwise negative except as noted in HPI.      Past Medical History: Noncontributory except per HPI     Past Surgical History: Noncontributory except per HPI     Family History: Reviewed and noncontributory     Social History:  Noncontributory except per HPI       Allergies   Allergen Reactions    Clonazepam Unknown    Dextroamphetamine-Amphetamine Unknown    Ziprasidone Hcl Other and Hallucinations       Past Medical History:   Diagnosis Date    Decreased white blood cell count, unspecified 09/20/2021    Leukopenia    Encounter for immunization 07/19/2016    Diphtheria-tetanus-pertussis (DTP) vaccination    Personal history of other infectious and parasitic diseases 09/29/2020    History of herpes simplex infection       Home Meds:   Current Outpatient Medications   Medication Instructions    Abilify Maintena 400 mg, intramuscular, Every 28 days    acetylcysteine 600 mg capsule capsule Take 1 capsule (600 mg) by mouth daily. (To increase the dose every few weeks /clinical response, Mother to contact Dr Forrester for adjustments (MAX dose is 2,400mg/day)  "   Adderall XR 30 mg 24 hr capsule 30 mg, oral, Every morning, Do not crush or chew.    Adderall XR 30 mg 24 hr capsule 30 mg, oral, Every morning, Do not crush or chew.    Adderall XR 30 mg 24 hr capsule 30 mg, oral, Every morning, Do not crush or chew.    [START ON 2/25/2025] Adderall XR 30 mg 24 hr capsule 30 mg, oral, Every morning, Do not crush or chew.    Adderall XR 30 mg 24 hr capsule 30 mg, oral, Every morning, Do not crush or chew.    [START ON 2/23/2025] Adderall XR 30 mg 24 hr capsule 30 mg, oral, Every morning, Do not crush or chew.    [START ON 3/25/2025] Adderall XR 30 mg 24 hr capsule 30 mg, oral, Every morning, Do not crush or chew.    Adderall XR 5 mg 24 hr capsule 5 mg, oral, Daily with lunch, Do not crush or chew.    Adderall XR 5 mg 24 hr capsule 5 mg, oral, Daily with lunch, Do not crush or chew.    [START ON 2/25/2025] Adderall XR 5 mg 24 hr capsule 5 mg, oral, Daily with lunch, Do not crush or chew.    Adderall XR 5 mg 24 hr capsule 5 mg, oral, Every morning, Do not crush or chew.    [START ON 2/23/2025] Adderall XR 5 mg 24 hr capsule 5 mg, oral, Every morning, Do not crush or chew.    [START ON 3/25/2025] Adderall XR 5 mg 24 hr capsule 5 mg, oral, Every morning, Do not crush or chew.    amoxicillin-pot clavulanate (Augmentin) 875-125 mg tablet 1 tablet, oral, 2 times daily    coQ10, ubiquinol, 100 mg capsule 1 capsule, Daily    ergocalciferol (Vitamin D-2) 1.25 MG (89022 UT) capsule 1 capsule, Weekly    ibuprofen (IBU) 600 mg tablet oral, Every 6 hours PRN    insulin syringe-needle U-100 31G X 5/16\" 0.5 mL syringe USE AS DIRECTED.    ketoconazole (NIZOral) 2 % shampoo  APPLY 1 APPLICATION EXTERNALLY TO SCALP 2-3 TIMES A WEEK FOR 2 WEEKS THEN ONCE WEEKLY FOR 2 WEEKS THEN AS NEEDED    latanoprost (Xalatan) 0.005 % ophthalmic solution 1 drop, Both Eyes, Nightly    multivitamin with minerals (MULTIPLE VITAMIN-MINERALS ORAL) 1 tablet, Daily    mupirocin (Bactroban) 2 % cream  APPLY THIN LAYER TO " "AFFECTED AREA(S) 2-3 TIMES DAILY.    prazosin (MINIPRESS) 4 mg, oral, Nightly    semaglutide (OZEMPIC) 1 mg, subcutaneous, Weekly    sertraline (ZOLOFT) 200 mg, oral, Daily    sertraline (ZOLOFT) 50 mg, oral, Daily        ED Triage Vitals [02/01/25 1604]   Temperature Heart Rate Respirations BP   36.9 °C (98.4 °F) 67 18 129/66      Pulse Ox Temp Source Heart Rate Source Patient Position   100 % Temporal Monitor Sitting      BP Location FiO2 (%)     Right arm --         Heart Rate:  [67]   Temperature:  [36.9 °C (98.4 °F)]   Respirations:  [18]   BP: (129)/(66)   Height:  [144.8 cm (4' 9\")]   Weight:  [66.4 kg (146 lb 4.8 oz)]   Pulse Ox:  [100 %]      Physical Exam:  Physical Exam  Vitals and nursing note reviewed.   Constitutional:       General: He is not in acute distress.     Appearance: Normal appearance. He is not ill-appearing.   HENT:      Head: Normocephalic and atraumatic.      Right Ear: External ear normal.      Left Ear: External ear normal.      Ears:        Nose: Nose normal.      Mouth/Throat:      Mouth: Mucous membranes are moist.   Eyes:      Extraocular Movements: Extraocular movements intact.      Conjunctiva/sclera: Conjunctivae normal.      Pupils: Pupils are equal, round, and reactive to light.   Cardiovascular:      Rate and Rhythm: Normal rate and regular rhythm.      Pulses: Normal pulses.   Pulmonary:      Effort: Pulmonary effort is normal. No respiratory distress.      Breath sounds: Normal breath sounds.   Abdominal:      General: Abdomen is flat.      Palpations: Abdomen is soft.      Tenderness: There is no abdominal tenderness.          Comments: Abrasion and the above area.  No drainage, foreign body, or significant depth.  Very superficial.   Musculoskeletal:         General: Normal range of motion.        Arms:       Cervical back: Normal range of motion and neck supple.      Comments: Small abrasion to the left lower back.  Wound is not gaping.  No drainage.  No foreign body.  " No surrounding erythema.   Skin:     General: Skin is warm and dry.      Capillary Refill: Capillary refill takes less than 2 seconds.   Neurological:      General: No focal deficit present.      Mental Status: He is alert and oriented to person, place, and time.   Psychiatric:         Mood and Affect: Mood normal.          Diagnostic Results        Labs Reviewed - No data to display      No orders to display                 No data recorded                Procedure  Procedures    ED Course & MDM   Assessment/Plan:     Medications   amoxicillin-pot clavulanate (Augmentin) 875-125 mg per tablet 1 tablet (1 tablet oral Given 2/1/25 1704)        Diagnoses as of 02/01/25 1945   Dog bite, initial encounter       Medical Decision Making    Lonny Andrews is a 34 y.o. male with past medical history of Prader-Willi syndrome, ADHD, PTSD, skin picking, mood disorder, oppositional defiant disorder, and self injures behavior presents with mother with concern for dog bite.  Patient is nontoxic-appearing and vital signs are normal.  Based on symptoms and presentation, differential diagnosis includes dog bite, retained foreign body, early infection.  Patient's wounds were cleansed by myself at bedside.  No dressings applied as they are already dry and crusting.  He was given first dose of Augmentin in the emergency department.  Given that there were no puncture wounds, obvious retained foreign bodies, or deep lacerations, no further workup or closure of wounds was indicated.    Dog scratches: Patient and mom were educated about this bite.  We discussed the dog bites are not fully closed to allow for drainage of any bacteria in the wound.  Antibiotics were ordered for infection prophylaxis and need to be taken in their entirety.  We reviewed wound care instructions which include cleansing the wound twice a day with soap and water, monitoring for signs and symptoms of infection (redness, warmth, swelling, or drainage), and not  soaking the wound in water or soap.  Recommended returning to the emergency department for any of the above symptoms or any new symptoms.  Patient agreeable to plan of care and felt comfortable returning home.     Disposition: Home    ED Prescriptions       Medication Sig Dispense Start Date End Date Auth. Provider    amoxicillin-pot clavulanate (Augmentin) 875-125 mg tablet Take 1 tablet by mouth 2 times a day for 10 days. 20 tablet 2/1/2025 2/11/2025 Latoya Haynes PA-C            Social Determinants Affecting Care: none     Latoya Haynes PA-C    This note was dictated by speech recognition. Minor errors in transcription may be present.     Latoya Haynes PA-C  02/01/25 7891

## 2025-02-05 ENCOUNTER — HOSPITAL ENCOUNTER (OUTPATIENT)
Dept: RADIOLOGY | Facility: CLINIC | Age: 35
Discharge: HOME | End: 2025-02-05
Payer: MEDICAID

## 2025-02-05 DIAGNOSIS — Q87.11 PRADER-WILLI SYNDROME (HHS-HCC): ICD-10-CM

## 2025-02-05 DIAGNOSIS — F43.10 PTSD (POST-TRAUMATIC STRESS DISORDER): ICD-10-CM

## 2025-02-05 DIAGNOSIS — E29.1 HYPOGONADISM IN MALE: ICD-10-CM

## 2025-02-05 PROCEDURE — 77080 DXA BONE DENSITY AXIAL: CPT

## 2025-02-05 PROCEDURE — 77080 DXA BONE DENSITY AXIAL: CPT | Performed by: RADIOLOGY

## 2025-02-10 NOTE — PROGRESS NOTES
Reviewed DEXA results with Dr. Pena. They are not concerning fro osteoporosis or osteopenia right now. I spoke to the patient's mother, and reassured regarding the same as well.    I hd also received a message about GLP-1 not being covered, and I spoke to patient's mother about an alternative medication called Topiramate, and explained how that could be of help here. She was under the weather herself, and asked me if we could discuss this in detail at his visit in 03/2025. I reassured her that we will.

## 2025-02-14 ENCOUNTER — APPOINTMENT (OUTPATIENT)
Dept: BEHAVIORAL HEALTH | Facility: CLINIC | Age: 35
End: 2025-02-14
Payer: MEDICAID

## 2025-02-17 ENCOUNTER — TELEPHONE (OUTPATIENT)
Dept: GENETICS | Facility: CLINIC | Age: 35
End: 2025-02-17
Payer: MEDICAID

## 2025-02-17 NOTE — TELEPHONE ENCOUNTER
Genetics note:    Called mother and discussed. Skin picking is improving slowly. Will increase the dose from 1800mg/day to 2400mg/day (2 capsules po BID) which is the maximum dose. Mother reports no behavioral changes. Will follow up on this in late March.     Carrington Forrester MD  Medical Geneticist

## 2025-02-21 ENCOUNTER — APPOINTMENT (OUTPATIENT)
Dept: BEHAVIORAL HEALTH | Facility: CLINIC | Age: 35
End: 2025-02-21
Payer: MEDICAID

## 2025-02-21 ENCOUNTER — CONSULT (OUTPATIENT)
Dept: PHYSICAL MEDICINE AND REHAB | Facility: CLINIC | Age: 35
End: 2025-02-21
Payer: MEDICAID

## 2025-02-21 VITALS — RESPIRATION RATE: 18 BRPM | HEART RATE: 60 BPM | DIASTOLIC BLOOD PRESSURE: 66 MMHG | SYSTOLIC BLOOD PRESSURE: 100 MMHG

## 2025-02-21 DIAGNOSIS — M25.50 GENERALIZED JOINT PAIN: Primary | ICD-10-CM

## 2025-02-21 DIAGNOSIS — Q87.11 PRADER-WILLI SYNDROME (HHS-HCC): ICD-10-CM

## 2025-02-21 DIAGNOSIS — R26.81 UNSTEADY GAIT WHEN WALKING: ICD-10-CM

## 2025-02-21 DIAGNOSIS — R79.89 LOW VITAMIN D LEVEL: ICD-10-CM

## 2025-02-21 PROCEDURE — 99244 OFF/OP CNSLTJ NEW/EST MOD 40: CPT | Performed by: PHYSICAL MEDICINE & REHABILITATION

## 2025-02-21 RX ORDER — DICLOFENAC SODIUM 10 MG/G
4 GEL TOPICAL 3 TIMES DAILY PRN
Qty: 100 G | Refills: 3 | Status: SHIPPED | OUTPATIENT
Start: 2025-02-21

## 2025-02-21 ASSESSMENT — PAIN SCALES - GENERAL: PAINLEVEL_OUTOF10: 10-WORST PAIN EVER

## 2025-02-21 NOTE — PROGRESS NOTES
"Physical Medicine and Rehabilitation MSK Consult  02/21/25       Chief Complaint:  Low back pain     HPI:  Lonny Andrews is a  34 y.o. F who presents to the clinic today  for evaluation of arthralgias.    Referred by genetics \"Has Prader-Willi Syndrome and hypotonia, unsteady gait, poor posture - assess to see if could benefit from certain exercises, braces, etc \"    Onset: generalized joint pain that has been for years'; but more so in the last year.  States elbow pain is new in the last week, left.   He also has hypotonia due to Prader Barry syndrome.  He also has unsteady gait for at least a year.   Sometimes bumps into the aponte, staggers.     Feels dizzy and light headedness. Uses the wall for balances. Happens when gets up too fast.   This occurs once or twice a day.     Hold on to railing when doing steps.     He has been ding exercises video daily for about ten years ago and after 2018 was hard to do the video due to pain.  Mom had  a TBI and this took them off track w the exercise program. States that he sometimes is able to walk 5 miles and sometimes not.  Some days feel good and some not so good. States he has more bad days.   Sleep is not always great.     States joint pains come and go. Elbow pain is new,. Complains more lower extremity and upper pain.  States pain is sharp.  Has an appt for sleep mediine.       Imaging  Mri brain 7/2022  FINDINGS:  There is no evidence of a sellar or suprasellar mass.  The pituitary  gland enhances homogenously. Pituitary gland is slightly smaller in  size/shows mild hypoplasia. Trace deviation of the infundibulum to  the left. The optic chiasm is unremarkable.  Bilateral cavernous  sinuses demonstrate symmetric enhancement. Visualized internal  carotid arteries demonstrate expected flow voids. Visualized portions  of the intracranial structures on T2 weighted axial images are  unremarkable.     IMPRESSION:  No evidence of a sellar or suprasellar mass.  Past " Medical History:   Diagnosis Date    Decreased white blood cell count, unspecified 09/20/2021    Leukopenia    Encounter for immunization 07/19/2016    Diphtheria-tetanus-pertussis (DTP) vaccination    Personal history of other infectious and parasitic diseases 09/29/2020    History of herpes simplex infection        No past surgical history on file.     Patient Active Problem List    Diagnosis Date Noted    Ocular hypertension, bilateral 12/19/2024    Myopia of both eyes 12/19/2024    Abnormal liver enzymes 08/26/2023    Acute electrocardiography changes 08/26/2023    Adjustment disorder with mixed disturbance of emotions and conduct 08/26/2023    Allergic rhinitis 08/26/2023    Anemia 08/26/2023    Ataxia 08/26/2023    Attention-deficit/hyperactivity disorder 08/26/2023    Bilateral hip pain 08/26/2023    Bilateral knee pain 08/26/2023    Pain in both lower extremities 08/26/2023    Canker sore 08/26/2023    Central obesity 08/26/2023    Cervical lymphadenopathy 08/26/2023    Daytime somnolence 08/26/2023    Dental disorder 08/26/2023    Dog bite of face 08/26/2023    Dorsal cervical fat pad 08/26/2023    Foot sprain, left, initial encounter 08/26/2023    Elevated creatine kinase level 08/26/2023    Gastric dysmotility 08/26/2023    Gastroparesis 08/26/2023    Hypercholesterolemia 08/26/2023    Hyperglycemia 08/26/2023    Hypogonadism in male 08/26/2023    Impairment of balance 08/26/2023    Injury of ankle and foot, left, initial encounter 08/26/2023    Intractable chronic migraine without aura and without status migrainosus 08/26/2023    Kyphosis, acquired 08/26/2023    Low testosterone in male 08/26/2023    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 08/26/2023    Muscle cramps 08/26/2023    Nasal dryness 08/26/2023    Nightmare disorder 08/26/2023    Obsessive compulsive disorder 08/26/2023    PTSD (post-traumatic stress disorder) 08/26/2023    Nocturnal leg movements 08/26/2023     Mental disorder 08/26/2023    Obesity 08/26/2023    Oppositional defiant disorder 08/26/2023    Poor sleep pattern 08/26/2023    Prader-Willi syndrome ([facility]) 08/26/2023    Prediabetes 08/26/2023    Psychological factor affecting physical condition 08/26/2023    Recurrent HSV (herpes simplex virus) 08/26/2023    Sleep-disordered breathing 08/26/2023    Superficial bacterial infection of skin 08/26/2023    Tinea corporis 08/26/2023    Tinea cruris 08/26/2023    Unsteady gait when walking 08/26/2023    Urinary incontinence 08/26/2023    Vitamin D deficiency 08/26/2023    Weight gain 08/26/2023    Witnessed episode of apnea 08/26/2023        Family History   Problem Relation Name Age of Onset    Asthma Mother      Hyperlipidemia Mother      Hypertension Mother      Migraines Mother      Asthma Father      Hypertension Father      Diabetes Father      Asthma Sister      Anxiety disorder Sister      Depression Sister      Migraines Sister      Diabetes Other          Current Outpatient Medications   Medication Sig Dispense Refill    acetylcysteine 600 mg capsule capsule Take 1 capsule (600 mg) by mouth daily. (To increase the dose every few weeks /clinical response, Mother to contact Dr Forrester for adjustments (MAX dose is 2,400mg/day) 60 capsule 3    Adderall XR 30 mg 24 hr capsule Take 1 capsule (30 mg) by mouth once daily in the morning. Do not crush or chew. Do not fill before January 26, 2025. 30 capsule 0    [START ON 2/25/2025] Adderall XR 30 mg 24 hr capsule Take 1 capsule (30 mg) by mouth once daily in the morning. Do not crush or chew. Do not fill before February 25, 2025. 30 capsule 0    Adderall XR 30 mg 24 hr capsule Take 1 capsule (30 mg) by mouth once daily in the morning. Do not crush or chew. 30 capsule 0    [START ON 2/23/2025] Adderall XR 30 mg 24 hr capsule Take 1 capsule (30 mg) by mouth once daily in the morning. Do not crush or chew. Do not fill before February 23, 2025. 30 capsule 0     "[START ON 3/25/2025] Adderall XR 30 mg 24 hr capsule Take 1 capsule (30 mg) by mouth once daily in the morning. Do not crush or chew. Do not fill before March 25, 2025. 30 capsule 0    Adderall XR 5 mg 24 hr capsule Take 1 capsule (5 mg) by mouth once daily at noon. Take with meals. Do not crush or chew. Do not fill before January 26, 2025. 30 capsule 0    [START ON 2/25/2025] Adderall XR 5 mg 24 hr capsule Take 1 capsule (5 mg) by mouth once daily at noon. Take with meals. Do not crush or chew. Do not fill before February 25, 2025. 30 capsule 0    Adderall XR 5 mg 24 hr capsule Take 1 capsule (5 mg) by mouth once daily in the morning. Do not crush or chew. 30 capsule 0    [START ON 2/23/2025] Adderall XR 5 mg 24 hr capsule Take 1 capsule (5 mg) by mouth once daily in the morning. Do not crush or chew. Do not fill before February 23, 2025. 30 capsule 0    [START ON 3/25/2025] Adderall XR 5 mg 24 hr capsule Take 1 capsule (5 mg) by mouth once daily in the morning. Do not crush or chew. Do not fill before March 25, 2025. 30 capsule 0    ARIPiprazole ER (Abilify Maintena) 400 mg injection Inject 400 mg into the muscle every 28 (twenty-eight) days. 1 each 3    coQ10, ubiquinol, 100 mg capsule Take 1 capsule (100 mg) by mouth once daily.      ergocalciferol (Vitamin D-2) 1.25 MG (92709 UT) capsule Take 1 capsule (1,250 mcg) by mouth once a week.      ibuprofen (IBU) 600 mg tablet Take by mouth every 6 hours if needed.      insulin syringe-needle U-100 31G X 5/16\" 0.5 mL syringe USE AS DIRECTED.      ketoconazole (NIZOral) 2 % shampoo APPLY 1 APPLICATION EXTERNALLY TO SCALP 2-3 TIMES A WEEK FOR 2 WEEKS THEN ONCE WEEKLY FOR 2 WEEKS THEN AS NEEDED 120 mL 3    latanoprost (Xalatan) 0.005 % ophthalmic solution Administer 1 drop into both eyes once daily at bedtime. 7.5 mL 3    multivitamin with minerals (MULTIPLE VITAMIN-MINERALS ORAL) Take 1 tablet by mouth once daily.      mupirocin (Bactroban) 2 % cream APPLY THIN LAYER TO " AFFECTED AREA(S) 2-3 TIMES DAILY. 30 g 1    prazosin (Minipress) 2 mg capsule Take 2 capsules (4 mg) by mouth once daily at bedtime. 60 capsule 3    semaglutide (Ozempic) 1 mg/dose (2 mg/1.5 mL) pen injector Inject 1 mg under the skin 1 (one) time per week. 0.02 g 2    sertraline (Zoloft) 100 mg tablet Take 2 tablets (200 mg) by mouth once daily. 60 tablet 3    sertraline (Zoloft) 50 mg tablet Take 1 tablet (50 mg) by mouth once daily. 30 tablet 3    Adderall XR 30 mg 24 hr capsule Take 1 capsule (30 mg) by mouth once daily in the morning. Do not crush or chew. 30 capsule 0    Adderall XR 30 mg 24 hr capsule Take 1 capsule (30 mg) by mouth once daily in the morning. Do not crush or chew. 30 capsule 0    Adderall XR 5 mg 24 hr capsule Take 1 capsule (5 mg) by mouth once daily at noon. Take with meals. Do not crush or chew. 30 capsule 0     No current facility-administered medications for this visit.        Allergies   Allergen Reactions    Clonazepam Unknown    Dextroamphetamine-Amphetamine Unknown    Ziprasidone Hcl Other and Hallucinations        Social History     Socioeconomic History    Marital status: Single   Tobacco Use    Smoking status: Never    Smokeless tobacco: Never   Substance and Sexual Activity    Alcohol use: Never    Drug use: Never   He was in a work program but difficulty w bahaviour issues,.  Has services through [facility]  Lives w mom and sisters and nephews.      Review of Systems:  Constitutional:  Denies fever or chills, malaise, weight changes.   Eyes:  Denies change in visual acuity   HENT:  Denies nasal congestion or sore throat   Respiratory:  Denies cough or shortness of breath   Cardiovascular:  Denies chest pain or edema   GI:  as per hpi  :  Denies dysuria   Integument:  Denies rash   Neurologic:  As per HPI  MSK: Per above HPI  Endocrine:  Denies polyuria or polydipsia   Lymphatic:  Denies swollen glands   Psychiatric:  as per hpi           PHYSICAL EXAM:  /66 (BP Location: Left  arm, Patient Position: Sitting)   Pulse 60   Resp 18     General:  NAD, well developed    Psychiatric: appropriate mood & affect.   Cardiovascular:  Normal pedal pulses; no LE edema.  Respiratory:  Normal rate; unlabored breathing.  Skin:  Intact; no erythema; no ecchymosis or rash.  Lymphatic:  No lymphadenopathy or lymphedema.  NEURO:  Alert and appropriate. Speech fluent, conversing appropriately.   Motor:    Rt: HF 5/5, KE 5/5, KF 5/5, DF 5/5, EHL 5/5, PF 5/5.    Lt: HF 5/5, KE 5/5, KF 5/5, DF 5/5, EHL 5/5, PF 5/5.  Sensation:     Light touch: intact in the b/l L3-S1 dermatomes.    Reflexes:   Brisk ue and le reflexes  No joint redness or warmth to palpation    Babinski's downgoing b/l; no clonus  Gait: bl knee valgus deformity, bl pes planus, wide based gait    Full rom elbow and knees,    R leg intermittently appears to give out  Some mild recurvatum       Impression: Lonny Andrews is a 34 y.o. M w pmh of Has Prader-Willi Syndrome, adhd, OCD, cognitive impairment presenting with diffuse joint pains that seem to come and go that affect his ability to exercise. Appears sometimes some of this is behavioral, however his mom state it is happening more frequently and due to diffuseness of symptoms need to rule out an autoimmune process.      Encounter Diagnoses   Name Primary?    Prader-Willi syndrome ([facility])     Unsteady gait when walking     Generalized joint pain Yes    Low vitamin D level        Plan:  Orders Placed This Encounter   Procedures    XR elbow left 3+ views    XR ankle right 2 views    XR ankle left 2 views    XR lumbar spine 2-3 views    ANGELO with Reflex to DEMETRIUS    Sedimentation Rate    C-reactive protein    Rheumatoid factor    Cyclic citrul peptide antibody, IgG    Vitamin D 25-Hydroxy,Total (for eval of Vitamin D levels)    Referral to Physical Therapy      Diffuse joint pain/arthralgia  - xr left elbow. Bk ankles, l spine  - angelo, esr, crp. Ra, ccp, vitamin d  - Pt water therapy for  strengthening conditioning and gait   - voltaren gel prn for joint pain  - can trial either ankle support braces low profile vs high top shoes for more ankle stability    Thank you for the consultation.     Santa Lawler MD  Physical Medicine and Rehabilitation

## 2025-02-25 ENCOUNTER — APPOINTMENT (OUTPATIENT)
Dept: BEHAVIORAL HEALTH | Facility: CLINIC | Age: 35
End: 2025-02-25
Payer: MEDICAID

## 2025-02-25 VITALS
TEMPERATURE: 97.9 F | RESPIRATION RATE: 18 BRPM | DIASTOLIC BLOOD PRESSURE: 75 MMHG | BODY MASS INDEX: 31.79 KG/M2 | SYSTOLIC BLOOD PRESSURE: 124 MMHG | WEIGHT: 146.9 LBS | HEART RATE: 80 BPM

## 2025-02-25 DIAGNOSIS — F06.31 MOOD DISORDER WITH DEPRESSIVE FEATURES DUE TO GENERAL MEDICAL CONDITION: Primary | ICD-10-CM

## 2025-02-25 ASSESSMENT — COLUMBIA-SUICIDE SEVERITY RATING SCALE - C-SSRS
TOTAL  NUMBER OF ABORTED OR SELF INTERRUPTED ATTEMPTS SINCE LAST CONTACT: NO
1. HAVE YOU WISHED YOU WERE DEAD OR WISHED YOU COULD GO TO SLEEP AND NOT WAKE UP?: NO
TOTAL  NUMBER OF INTERRUPTED ATTEMPTS LIFETIME: NO
SUICIDE, SINCE LAST CONTACT: NO
ATTEMPT LIFETIME: NO
2. HAVE YOU ACTUALLY HAD ANY THOUGHTS OF KILLING YOURSELF?: NO
ATTEMPT SINCE LAST CONTACT: NO
1. SINCE LAST CONTACT, HAVE YOU WISHED YOU WERE DEAD OR WISHED YOU COULD GO TO SLEEP AND NOT WAKE UP?: NO
6. HAVE YOU EVER DONE ANYTHING, STARTED TO DO ANYTHING, OR PREPARED TO DO ANYTHING TO END YOUR LIFE?: NO
TOTAL  NUMBER OF ABORTED OR SELF INTERRUPTED ATTEMPTS LIFETIME: NO
6. HAVE YOU EVER DONE ANYTHING, STARTED TO DO ANYTHING, OR PREPARED TO DO ANYTHING TO END YOUR LIFE?: NO
2. HAVE YOU ACTUALLY HAD ANY THOUGHTS OF KILLING YOURSELF?: NO
TOTAL  NUMBER OF INTERRUPTED ATTEMPTS SINCE LAST CONTACT: NO

## 2025-02-25 ASSESSMENT — PAIN SCALES - GENERAL: PAINLEVEL_OUTOF10: 0-NO PAIN

## 2025-02-25 NOTE — PROGRESS NOTES
Patient here at the clinic today to receive his monthly Abilify Maintena 400mg for Mood disorder with depressive features      Patient identified by full name and  and vitals obtained. patient last seen by provider 25 , last seen by nurse on 25  Medication verified by providers note and medication order       Appetite: no change  Sleep: No change  Appearance: clean, age appropriate, well-groomed  Build: average  Attitude: cooperative, calm, pleasant, friendly, open  Eye Contact: normal  Activity: alert, attentive, appropriate  Speech: appropriate & spontaneous, normal  Delusions: patient denies   Thought Content: logical  Thought Process: logical  Judgement/insight: Fair  Mood: calm happy  Affect: appropriate  AH/VH/SI/HI: patient denies  Access to firearms/weapons: No  Depression: patient denies  Thoughts of hopelessness: Patient denies  Anxiety: patient denies  Self-injurious behavior: patient denies  Cravings/Urges: NA  Last use: NA  Tobacco Use: non-smoker  Spiritual or cultural practices that may affect your care or impact your health care decisions: no  Living situation lives with mom whom is his guardian   Employed: No        Patient here at the clinic today to receive his monthly Abilify Maintena for Mood disorder accompanied by his mother. Patient was cooperative and engaged during this visit. Per mom there has been no issues with previous injection and denies any issues with SI/HI, VH/AH, depression and no recent hospitalizations      Patient received injection of Abilify Maintena 400mg/2ml via 23 gauge w/o incident into left   deltoid area. Patient tolerated injection well, no reaction at injection site.     RN provided education on medications sided effects, the importance of reporting any changes in the injection site, for minor arm pain utilization of ibuprofen and a warm compress should relive any discomfort.  RN has provided patient and mom direct office number for future questions and  the mobile crisis number for any emergent concerns that may arise.      LOT # [id number]  EXP:  AUG 2027  NDC: 41916-425-21        Patient will RTC in 4 weeks     VIANNEY SiddiquiN          no diabetes and no thyroid trouble.

## 2025-03-01 ENCOUNTER — HOSPITAL ENCOUNTER (OUTPATIENT)
Dept: RADIOLOGY | Facility: CLINIC | Age: 35
Discharge: HOME | End: 2025-03-01
Payer: MEDICAID

## 2025-03-01 ENCOUNTER — OFFICE VISIT (OUTPATIENT)
Dept: URGENT CARE | Age: 35
End: 2025-03-01
Payer: MEDICAID

## 2025-03-01 VITALS
RESPIRATION RATE: 15 BRPM | SYSTOLIC BLOOD PRESSURE: 124 MMHG | DIASTOLIC BLOOD PRESSURE: 83 MMHG | OXYGEN SATURATION: 99 % | TEMPERATURE: 98.1 F | HEART RATE: 68 BPM

## 2025-03-01 DIAGNOSIS — S00.31XA ABRASION OF NOSE, INITIAL ENCOUNTER: ICD-10-CM
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
## 2025-03-01 DIAGNOSIS — R26.81 UNSTEADY GAIT WHEN WALKING: ICD-10-CM

## 2025-03-01 DIAGNOSIS — S01.25XA: Primary | ICD-10-CM

## 2025-03-01 DIAGNOSIS — M25.50 GENERALIZED JOINT PAIN: ICD-10-CM

## 2025-03-01 DIAGNOSIS — S00.81XA: ICD-10-CM

## 2025-03-01 DIAGNOSIS — Q87.11 PRADER-WILLI SYNDROME (HHS-HCC): ICD-10-CM

## 2025-03-01 PROCEDURE — 72100 X-RAY EXAM L-S SPINE 2/3 VWS: CPT

## 2025-03-01 PROCEDURE — 73080 X-RAY EXAM OF ELBOW: CPT | Mod: LT

## 2025-03-01 PROCEDURE — 73080 X-RAY EXAM OF ELBOW: CPT | Mod: LEFT SIDE | Performed by: STUDENT IN AN ORGANIZED HEALTH CARE EDUCATION/TRAINING PROGRAM

## 2025-03-01 PROCEDURE — 73610 X-RAY EXAM OF ANKLE: CPT | Mod: LT

## 2025-03-01 PROCEDURE — 72100 X-RAY EXAM L-S SPINE 2/3 VWS: CPT | Performed by: STUDENT IN AN ORGANIZED HEALTH CARE EDUCATION/TRAINING PROGRAM

## 2025-03-01 PROCEDURE — 73610 X-RAY EXAM OF ANKLE: CPT | Mod: RT

## 2025-03-01 RX ORDER — MUPIROCIN 20 MG/G
OINTMENT TOPICAL 3 TIMES DAILY
Qty: 22 G | Refills: 0 | Status: SHIPPED | OUTPATIENT
Start: 2025-03-01 | End: 2025-03-11

## 2025-03-01 RX ORDER — DOXYCYCLINE 100 MG/1
100 CAPSULE ORAL 2 TIMES DAILY
Qty: 14 CAPSULE | Refills: 0 | Status: SHIPPED | OUTPATIENT
Start: 2025-03-01 | End: 2025-03-08
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
## 2025-03-06 ENCOUNTER — PHARMACY VISIT (OUTPATIENT)
Dept: PHARMACY | Facility: CLINIC | Age: 35
End: 2025-03-06
Payer: COMMERCIAL

## 2025-03-06 PROCEDURE — RXMED WILLOW AMBULATORY MEDICATION CHARGE

## 2025-03-07 ENCOUNTER — APPOINTMENT (OUTPATIENT)
Dept: ENDOCRINOLOGY | Facility: CLINIC | Age: 35
End: 2025-03-07
Payer: MEDICAID

## 2025-03-07 VITALS
HEART RATE: 71 BPM | SYSTOLIC BLOOD PRESSURE: 113 MMHG | WEIGHT: 161 LBS | DIASTOLIC BLOOD PRESSURE: 74 MMHG | BODY MASS INDEX: 31.61 KG/M2 | HEIGHT: 60 IN

## 2025-03-07 DIAGNOSIS — E29.1 HYPOGONADISM MALE: Primary | ICD-10-CM

## 2025-03-07 DIAGNOSIS — E29.1 HYPOGONADISM IN MALE: ICD-10-CM

## 2025-03-07 DIAGNOSIS — Q87.11 PRADER-WILLI SYNDROME (HHS-HCC): ICD-10-CM

## 2025-03-07 PROCEDURE — 3008F BODY MASS INDEX DOCD: CPT | Performed by: INTERNAL MEDICINE

## 2025-03-07 PROCEDURE — 99214 OFFICE O/P EST MOD 30 MIN: CPT | Performed by: INTERNAL MEDICINE

## 2025-03-07 RX ORDER — TESTOSTERONE GEL, 1% 10 MG/G
25 GEL TRANSDERMAL DAILY
Qty: 90 PACKET | Refills: 1 | Status: SHIPPED | OUTPATIENT
Start: 2025-03-07 | End: 2025-06-05

## 2025-03-07 NOTE — PROGRESS NOTES
Subjective   Patient ID: Lonny Andrews is a 34 y.o. male who presents for Hypogonadism.    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     Background Hx:  Patient was born prematurely ~36 weeks, with significant for, lower weight and progressive hypotonic along with difficulty feeding and decrease appetite. His initial care was done in [facility] but shortly after transferred to [facility]. 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 Childhood and adulthood has been marked by neurocognitive delayed, behavioral issues such as PTDS, OCD, anxiety, violent mood, hyperphagia and obesity, and crypocorchisism. He used to follow in the pediatric department of [facility] and from [id number] was treated with GH analogs. No other hormone replacement therapy.      She reports that hyperphagia is still a problem needing to lock down the food and hide it, but given strict diet and exercise activity he has never been above 200 lbs. He does have craving for sweets but no salt. In the past, he has been enrolled PWS dedicated program from [facility].      During the past year patient's mother have noticed him more fatigued. Requiring more frequent naps during the day. Sleep study was done but resulted inconclusive since patient was not able to follow instructions. Also was referred to urology to evaluate hypogonadism as cause of fatigue. Biochemical labs showed Testosterone totoal : 95, testosterone free: 12.2, prolactin <1.0, estradiol: 3.5, FSH: 7.1, LH: 1.7, TSH: 0.74 . In addition, they have recently consulted neurology due to new onset of worsening headaches for last 2 months. CT head 05/2022 was unremarkable.      Pituitary labs were repeated and were consistent with above, low testotrone , low FSH/LH and undectable  "prolactin (confirmed with diluted sample. MRI sella 7/2022 evidenced a really small pituitary gland with a thin pituitary stalk. Optic chiasma preserved. 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         Last visit: 09/2024, he was prescribed GLP-1 agonist, however, insurance did not approve.     Interval Hx:  The patient states he is doing fine, he never took  HCG and clomiphene , or GLP-1 agonist, as all were denied by the insurance as a prior authorization was needed  per the patient's mom.     He is doing ok. Noted with total of 6 lbs weight gain over the past 6 months. He is following a strict diet but not doing exercises. Reports generalized fatigue.   Energy wise, it is variable and the patient sleeps for the most part. He does not get any erections. He gets frustrated easily and that comes across verbally, however no aggression since 2019.    Compliant with vitD3 50.000 units weekly.    He had DEXA scan on 2/5/25-> normal bone density.    Review of Systems  Negative except what mentioned above    Objective   Vitals:    03/07/25 1044   BP: 113/74   Pulse: 71   Weight: 73 kg (161 lb)   Height: 1.473 m (4' 10\")      Physical Exam  General: short stature, cognitively delayed young male  HEENT: AT/NC  Neck: trachea in midline, mildly enlarged thyroid  Resp: CTA B/L  CVS: normal s1 and s2  Abdomen: soft and non tender to palpation, BS+  Skin: warm, dry and intact  Neuro: DTR 2 delayed relaxation, CN 2-12 grossly intact  Psych: cooperative but slow      Labs:   Latest Reference Range & Units 09/06/24 11:30   CORTISOL 2.5 - 20.0 ug/dL 6.9   FOLLICLE STIMULATING HORMONE IU/L 6.8   Parathyroid Hormone, Intact 18.5 - 88.0 pg/mL 21.7   Thyroxine, Free 0.78 - 1.48 ng/dL 0.97   LH IU/L 1.5   PROLACTIN 2.0 - 18.0 ug/L <1.0 (L)   Thyroid Stimulating Hormone 0.44 - 3.98 mIU/L 0.79   Vitamin D, 25-Hydroxy, Total 30 - 100 ng/mL " 10 (L)   DHEA Sulfate 95 - 530 ug/dL 319   Estradiol pg/mL <19   17-Hydroxypregnenolone <=442 ng/dL 76   Sex Hormone Binding Globulin 17 - 56 nmol/L 29     Component  Ref Range & Units 3/6/2025   VITAMIN D,25-OH,TOTAL,IA  30 - 100 ng/mL 61         DEXA 2/5/2025:  TECHNIQUE:  DEXA BONE DENSITY      FINDINGS:  SPINE L1-L4  Bone Mineral Density: 1.131  T-Score -0.4  Z-Score -1.5  Bone Mineral Density change vs baseline:  -3.1%  Bone Mineral Density change vs previous: Not reported      LEFT FEMUR -TOTAL  Bone Mineral Density: 1.007  T-Score 0.0   Z-Score  -1.5  Bone Mineral Density change vs baseline: -3.6%  Bone Mineral Density change vs previous: Not reported      LEFT FEMUR -NECK  Bone Mineral Density: 0.956  T-Score -0.6  Z-Score -1.8  Bone Mineral Density change vs baseline: -5.0%  Bone Mineral Density change vs previous: Not reported          World Health Organization (WHO) criteria for post-menopausal,   Women:  Normal:         T-score at or above -1 SD  Osteopenia:   T-score between -1 and -2.5 SD  Osteoporosis: T-score at or below -2.5 SD          10-year Fracture Risk:  Major Osteoporotic Fracture  Not reported  Hip Fracture                        Not reported      Note:  If no FRAX score is reported, it is because:  Some T-score for Spine Total or Hip Total or Femoral Neck at or below  -2.5          IMPRESSION:  DEXA:  According to World Health Organization criteria,  classification is normal.      Followup recommended in 2 years or sooner as clinically warranted.    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  Discussed with the patient and his mother regarding the denial of HCG and Clomiphene, and Ozempic.    #Hypogonadism:  [ ]labs today including LH, FSH, Prolactin, TSH, FT4, DHEAS, Testosterone,  IGF-1  [ ] will start patient testosterone gel ,  patient and his mom were educated on how to apply the testosterone gel.      #Vit D deficiency:  -most recent vitD level improved at 61 on 3/6/25  C/w cholecalciferol 50.000 units daily  [ ] labs today RFP, PTH      RTC in 6 months    Case seen, examined, and discussed with Dr. Arafah Diana Sawass Najjar, MD 03/07/25 11:08 AM

## 2025-03-08 LAB
25(OH)D3+25(OH)D2 SERPL-MCNC: 61 NG/ML (ref 30–100)
ALBUMIN SERPL-MCNC: 4.3 G/DL (ref 3.6–5.1)
ANA PAT SER IF-IMP: ABNORMAL
ANA SER QL IF: POSITIVE
ANA TITR SER IF: ABNORMAL TITER
BUN SERPL-MCNC: 16 MG/DL (ref 7–25)
BUN/CREAT SERPL: NORMAL (CALC) (ref 6–22)
CALCIUM SERPL-MCNC: 9.7 MG/DL (ref 8.6–10.3)
CCP IGG SERPL-ACNC: <16 UNITS
CENTROMERE B AB SER-ACNC: ABNORMAL AI
CHLORIDE SERPL-SCNC: 103 MMOL/L (ref 98–110)
CO2 SERPL-SCNC: 25 MMOL/L (ref 20–32)
CREAT SERPL-MCNC: 0.74 MG/DL (ref 0.6–1.26)
CRP SERPL-MCNC: <3 MG/L
DHEA-S SERPL-MCNC: 321 MCG/DL (ref 93–415)
DSDNA AB SER-ACNC: 1 IU/ML
EGFRCR SERPLBLD CKD-EPI 2021: 123 ML/MIN/1.73M2
ENA JO1 AB SER IA-ACNC: ABNORMAL AI
ENA RNP AB SER-ACNC: ABNORMAL AI
ENA SCL70 AB SER IA-ACNC: ABNORMAL AI
ENA SM AB SER IA-ACNC: ABNORMAL AI
ENA SM+RNP AB SER IA-ACNC: ABNORMAL AI
ENA SS-A AB SER IA-ACNC: ABNORMAL AI
ENA SS-B AB SER IA-ACNC: ABNORMAL AI
ERYTHROCYTE [SEDIMENTATION RATE] IN BLOOD BY WESTERGREN METHOD: 6 MM/H
FSH SERPL-ACNC: 8 MIU/ML (ref 1.4–12.8)
GLUCOSE SERPL-MCNC: 85 MG/DL (ref 65–139)
IGF-I SERPL-MCNC: NORMAL NG/ML
IGF-I Z-SCORE SERPL: NORMAL
IGF-I Z-SCORE SERPL: NORMAL
LABORATORY COMMENT REPORT: ABNORMAL
LH SERPL-ACNC: 0.6 MIU/ML (ref 1.5–9.3)
NUCLEOSOME AB SER IA-ACNC: ABNORMAL AI
PHOSPHATE SERPL-MCNC: 3.7 MG/DL (ref 2.5–4.5)
POTASSIUM SERPL-SCNC: 4.7 MMOL/L (ref 3.5–5.3)
PROLACTIN SERPL-MCNC: 1 NG/ML (ref 2–18)
PTH-INTACT SERPL-MCNC: 31 PG/ML (ref 16–77)
RHEUMATOID FACT SERPL-ACNC: <10 IU/ML
RIBOSOMAL P AB SER-ACNC: ABNORMAL AI
SHBG SERPL-SCNC: 33 NMOL/L (ref 10–50)
SODIUM SERPL-SCNC: 140 MMOL/L (ref 135–146)
T4 FREE SERPL-MCNC: 1 NG/DL (ref 0.8–1.8)
TESTOST FREE SERPL-MCNC: NORMAL PG/ML
TESTOST SERPL-MCNC: NORMAL NG/DL
TSH SERPL-ACNC: 0.72 MIU/L (ref 0.4–4.5)

## 2025-03-13 DIAGNOSIS — M25.50 GENERALIZED JOINT PAIN: Primary | ICD-10-CM

## 2025-03-13 DIAGNOSIS — Q87.11 PRADER-WILLI SYNDROME (HHS-HCC): ICD-10-CM

## 2025-03-13 LAB
ALBUMIN SERPL-MCNC: 4.3 G/DL (ref 3.6–5.1)
BUN SERPL-MCNC: 16 MG/DL (ref 7–25)
BUN/CREAT SERPL: NORMAL (CALC) (ref 6–22)
CALCIUM SERPL-MCNC: 9.7 MG/DL (ref 8.6–10.3)
CHLORIDE SERPL-SCNC: 103 MMOL/L (ref 98–110)
CO2 SERPL-SCNC: 25 MMOL/L (ref 20–32)
CREAT SERPL-MCNC: 0.74 MG/DL (ref 0.6–1.26)
DHEA-S SERPL-MCNC: 321 MCG/DL (ref 93–415)
EGFRCR SERPLBLD CKD-EPI 2021: 123 ML/MIN/1.73M2
FSH SERPL-ACNC: 8 MIU/ML (ref 1.4–12.8)
GLUCOSE SERPL-MCNC: 85 MG/DL (ref 65–139)
IGF-I SERPL-MCNC: 218 NG/ML (ref 53–331)
IGF-I Z-SCORE SERPL: 0.9 SD
LH SERPL-ACNC: 0.6 MIU/ML (ref 1.5–9.3)
PHOSPHATE SERPL-MCNC: 3.7 MG/DL (ref 2.5–4.5)
POTASSIUM SERPL-SCNC: 4.7 MMOL/L (ref 3.5–5.3)
PROLACTIN SERPL-MCNC: 1 NG/ML (ref 2–18)
PTH-INTACT SERPL-MCNC: 31 PG/ML (ref 16–77)
SHBG SERPL-SCNC: 33 NMOL/L (ref 10–50)
SODIUM SERPL-SCNC: 140 MMOL/L (ref 135–146)
T4 FREE SERPL-MCNC: 1 NG/DL (ref 0.8–1.8)
TESTOST FREE SERPL-MCNC: 4 PG/ML (ref 35–155)
TESTOST SERPL-MCNC: 32 NG/DL (ref 250–1100)
TSH SERPL-ACNC: 0.72 MIU/L (ref 0.4–4.5)

## 2025-03-17 ENCOUNTER — DOCUMENTATION (OUTPATIENT)
Dept: ENDOCRINOLOGY | Facility: HOSPITAL | Age: 35
End: 2025-03-17
Payer: MEDICAID

## 2025-03-17 NOTE — PROGRESS NOTES
Labs obtained after last visit resulted in:   Latest Reference Range & Units 03/07/25 12:15   FSH 1.4 - 12.8 mIU/mL 8.0   PARATHYROID HORMONE, INTACT 16 - 77 pg/mL 31   T4, FREE 0.8 - 1.8 ng/dL 1.0   LH 1.5 - 9.3 mIU/mL 0.6 (L)   PROLACTIN 2.0 - 18.0 ng/mL 1.0 (L)   TSH 0.40 - 4.50 mIU/L 0.72   DHEA SULFATE 93 - 415 mcg/dL 321      Latest Reference Range & Units 03/07/25 12:15   SEX HORMONE BINDING GLOBULIN 10 - 50 nmol/L 33      Latest Reference Range & Units 03/07/25 12:15   TESTOSTERONE, TOTAL,  - 1,100 ng/dL 32 (L)   TESTOSTERONE, FREE 35.0 - 155.0 pg/mL 4.0 (L)       Will continue same plan. Testosterone order is in place.   Discussed with dr. Pena

## 2025-03-19 ENCOUNTER — APPOINTMENT (OUTPATIENT)
Dept: BEHAVIORAL HEALTH | Facility: CLINIC | Age: 35
End: 2025-03-19
Payer: MEDICAID

## 2025-03-19 VITALS
HEART RATE: 60 BPM | SYSTOLIC BLOOD PRESSURE: 94 MMHG | BODY MASS INDEX: 29.7 KG/M2 | WEIGHT: 151.25 LBS | HEIGHT: 60 IN | TEMPERATURE: 97.7 F | DIASTOLIC BLOOD PRESSURE: 60 MMHG

## 2025-03-19 DIAGNOSIS — Z79.899 ENCOUNTER FOR LONG-TERM (CURRENT) USE OF HIGH-RISK MEDICATION: ICD-10-CM

## 2025-03-19 DIAGNOSIS — Q87.11 PRADER-WILLI SYNDROME (HHS-HCC): ICD-10-CM

## 2025-03-19 DIAGNOSIS — F90.2 ATTENTION DEFICIT HYPERACTIVITY DISORDER (ADHD), COMBINED TYPE: ICD-10-CM

## 2025-03-19 DIAGNOSIS — G47.8 POOR SLEEP PATTERN: ICD-10-CM

## 2025-03-19 DIAGNOSIS — F06.31 MOOD DISORDER WITH DEPRESSIVE FEATURES DUE TO GENERAL MEDICAL CONDITION: Primary | ICD-10-CM

## 2025-03-19 DIAGNOSIS — F43.10 PTSD (POST-TRAUMATIC STRESS DISORDER): ICD-10-CM

## 2025-03-19 PROCEDURE — 3008F BODY MASS INDEX DOCD: CPT | Performed by: NURSE PRACTITIONER

## 2025-03-19 PROCEDURE — 99215 OFFICE O/P EST HI 40 MIN: CPT | Performed by: NURSE PRACTITIONER

## 2025-03-19 RX ORDER — DEXTROAMPHETAMINE SULFATE, DEXTROAMPHETAMINE SACCHARATE, AMPHETAMINE SULFATE AND AMPHETAMINE ASPARTATE 7.5; 7.5; 7.5; 7.5 MG/1; MG/1; MG/1; MG/1
30 CAPSULE, EXTENDED RELEASE ORAL EVERY MORNING
Qty: 30 CAPSULE | Refills: 0 | Status: SHIPPED | OUTPATIENT
Start: 2025-04-18 | End: 2025-05-18

## 2025-03-19 RX ORDER — DEXTROAMPHETAMINE SULFATE, DEXTROAMPHETAMINE SACCHARATE, AMPHETAMINE SULFATE AND AMPHETAMINE ASPARTATE 7.5; 7.5; 7.5; 7.5 MG/1; MG/1; MG/1; MG/1
30 CAPSULE, EXTENDED RELEASE ORAL EVERY MORNING
Qty: 30 CAPSULE | Refills: 0 | Status: SHIPPED | OUTPATIENT
Start: 2025-05-18 | End: 2025-06-17

## 2025-03-19 RX ORDER — DEXTROAMPHETAMINE SULFATE, DEXTROAMPHETAMINE SACCHARATE, AMPHETAMINE SULFATE AND AMPHETAMINE ASPARTATE 7.5; 7.5; 7.5; 7.5 MG/1; MG/1; MG/1; MG/1
30 CAPSULE, EXTENDED RELEASE ORAL EVERY MORNING
Qty: 30 CAPSULE | Refills: 0 | Status: SHIPPED | OUTPATIENT
Start: 2025-03-19 | End: 2025-04-18

## 2025-03-19 RX ORDER — ARIPIPRAZOLE 400 MG
400 KIT INTRAMUSCULAR
Qty: 1 EACH | Refills: 3 | Status: SHIPPED | OUTPATIENT
Start: 2025-03-19

## 2025-03-19 RX ORDER — SERTRALINE HYDROCHLORIDE 50 MG/1
50 TABLET, FILM COATED ORAL DAILY
Qty: 30 TABLET | Refills: 3 | Status: SHIPPED | OUTPATIENT
Start: 2025-03-19 | End: 2025-07-17

## 2025-03-19 RX ORDER — SERTRALINE HYDROCHLORIDE 100 MG/1
200 TABLET, FILM COATED ORAL DAILY
Qty: 60 TABLET | Refills: 3 | Status: SHIPPED | OUTPATIENT
Start: 2025-03-19 | End: 2025-07-17

## 2025-03-19 RX ORDER — DEXTROAMPHETAMINE SULFATE, DEXTROAMPHETAMINE SACCHARATE, AMPHETAMINE SULFATE AND AMPHETAMINE ASPARTATE 1.25; 1.25; 1.25; 1.25 MG/1; MG/1; MG/1; MG/1
5 CAPSULE, EXTENDED RELEASE ORAL EVERY MORNING
Qty: 30 CAPSULE | Refills: 0 | Status: SHIPPED | OUTPATIENT
Start: 2025-03-19 | End: 2025-04-18

## 2025-03-19 RX ORDER — PRAZOSIN HYDROCHLORIDE 2 MG/1
4 CAPSULE ORAL NIGHTLY
Qty: 60 CAPSULE | Refills: 3 | Status: SHIPPED | OUTPATIENT
Start: 2025-03-19

## 2025-03-19 RX ORDER — DEXTROAMPHETAMINE SULFATE, DEXTROAMPHETAMINE SACCHARATE, AMPHETAMINE SULFATE AND AMPHETAMINE ASPARTATE 1.25; 1.25; 1.25; 1.25 MG/1; MG/1; MG/1; MG/1
5 CAPSULE, EXTENDED RELEASE ORAL EVERY MORNING
Qty: 30 CAPSULE | Refills: 0 | Status: SHIPPED | OUTPATIENT
Start: 2025-05-18 | End: 2025-06-17

## 2025-03-19 RX ORDER — DEXTROAMPHETAMINE SULFATE, DEXTROAMPHETAMINE SACCHARATE, AMPHETAMINE SULFATE AND AMPHETAMINE ASPARTATE 1.25; 1.25; 1.25; 1.25 MG/1; MG/1; MG/1; MG/1
5 CAPSULE, EXTENDED RELEASE ORAL EVERY MORNING
Qty: 30 CAPSULE | Refills: 0 | Status: SHIPPED | OUTPATIENT
Start: 2025-04-18 | End: 2025-05-18

## 2025-03-19 ASSESSMENT — ABNORMAL INVOLUNTARY MOVEMENT SCALE (AIMS)
CURRENT_PROBLEMS_TEETH_DENTURES: NO
AIMS_PATIENT_AWARENESS: NO AWARENESS
LOWER_BODY_EXTREMITIES: NONE, NORMAL
FACIAL_EXPRESSION_MUSCLES: NONE, NORMAL
NECK_SHOULDER_HIPS: NONE, NORMAL
UPPER_BODY_EXTREMITIES: NONE, NORMAL
LIPS_PARIETAL: NONE, NORMAL
TONGUE: NONE, NORMAL
AIMS_PATIENT_INCAPACITATION: NONE, NORMAL
AIMS_SEVERITY: 0
PATIENT_WEARS_DENTURES: NO
JAW: NONE, NORMAL

## 2025-03-19 NOTE — PATIENT INSTRUCTIONS
1. Continue Adderall XR 5 mg by mouth at noon as booster for ADHD. Not able to get name brand in IR. Therefore, XR ordered.  2. Continue Adderall XR 30 mg by mouth daily for ADHD- must be name brand. I have personally reviewed the OARRS report 3/19/25. I have considered the risks of abuse, dependence, addiction and diversion.  Stim agreement signed 7/3/2024  Urine amphetamine and drug screen 12/27/24 in office   3. Continue sertraline (Zoloft) 250 mg by mouth daily for PTSD and skin excoriation  4. Continue Prazosin 4 mg by mouth at bedtime for nightmares related to PTSD.   5. Continue Abilify Maintena 400 mg IM Q 4 weeks for moods   6. Risks, benefits, alternatives, off-label uses, and side effects of medications have been discussed with patient/caregiver. There is no report of signs/symptoms consistent with medication-induced impairment in daily functioning. At this time, benefits of medication felt to outweigh potential risks. Will continue to reassess need for psychotropic medication at regular 3 month intervals.  7. Return to clinic 3 months for an in-person appointment or earlier if needed. Call [phone] to reschedule.  8. Mom will obtain pharmacogenomics testing (PGT) results from previous MH & scan to [email or web address] or bring to office     Thank you for seeing me today. If you have any questions, do not hesitate to contact my office.  Gwendolyn Anthony    TREATMENT TYPE         counseling and coordination of care; addressing signs and symptoms of illness; risks/benefits and side effects of medications; and behavioral approaches to illness.  This note was created using electronic dictation. There may be errors in syntax and meaning. Please contact the office with any questions.
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
## 2025-03-25 ENCOUNTER — APPOINTMENT (OUTPATIENT)
Dept: BEHAVIORAL HEALTH | Facility: CLINIC | Age: 35
End: 2025-03-25
Payer: MEDICAID

## 2025-03-25 VITALS
TEMPERATURE: 97.8 F | RESPIRATION RATE: 18 BRPM | HEART RATE: 80 BPM | SYSTOLIC BLOOD PRESSURE: 105 MMHG | DIASTOLIC BLOOD PRESSURE: 69 MMHG

## 2025-03-25 DIAGNOSIS — F06.31 MOOD DISORDER WITH DEPRESSIVE FEATURES DUE TO GENERAL MEDICAL CONDITION: Primary | ICD-10-CM

## 2025-03-25 ASSESSMENT — COLUMBIA-SUICIDE SEVERITY RATING SCALE - C-SSRS
TOTAL  NUMBER OF INTERRUPTED ATTEMPTS SINCE LAST CONTACT: NO
1. SINCE LAST CONTACT, HAVE YOU WISHED YOU WERE DEAD OR WISHED YOU COULD GO TO SLEEP AND NOT WAKE UP?: NO
TOTAL  NUMBER OF ABORTED OR SELF INTERRUPTED ATTEMPTS SINCE LAST CONTACT: NO
ATTEMPT SINCE LAST CONTACT: NO
6. HAVE YOU EVER DONE ANYTHING, STARTED TO DO ANYTHING, OR PREPARED TO DO ANYTHING TO END YOUR LIFE?: NO
2. HAVE YOU ACTUALLY HAD ANY THOUGHTS OF KILLING YOURSELF?: NO
SUICIDE, SINCE LAST CONTACT: NO
6. HAVE YOU EVER DONE ANYTHING, STARTED TO DO ANYTHING, OR PREPARED TO DO ANYTHING TO END YOUR LIFE?: NO
TOTAL  NUMBER OF INTERRUPTED ATTEMPTS LIFETIME: NO
2. HAVE YOU ACTUALLY HAD ANY THOUGHTS OF KILLING YOURSELF?: NO
TOTAL  NUMBER OF ABORTED OR SELF INTERRUPTED ATTEMPTS LIFETIME: NO
1. HAVE YOU WISHED YOU WERE DEAD OR WISHED YOU COULD GO TO SLEEP AND NOT WAKE UP?: NO
ATTEMPT LIFETIME: NO

## 2025-03-25 ASSESSMENT — PAIN SCALES - GENERAL: PAINLEVEL_OUTOF10: 0-NO PAIN

## 2025-03-26 ENCOUNTER — APPOINTMENT (OUTPATIENT)
Dept: PRIMARY CARE | Facility: CLINIC | Age: 35
End: 2025-03-26
Payer: MEDICAID

## 2025-03-26 DIAGNOSIS — G47.19 EXCESSIVE DAYTIME SLEEPINESS: ICD-10-CM

## 2025-03-26 DIAGNOSIS — L21.9 SEBORRHEIC DERMATITIS: ICD-10-CM

## 2025-03-26 DIAGNOSIS — D64.9 ANEMIA, UNSPECIFIED TYPE: ICD-10-CM

## 2025-03-26 DIAGNOSIS — R73.03 PREDIABETES: ICD-10-CM

## 2025-03-26 DIAGNOSIS — R27.0 ATAXIA: ICD-10-CM

## 2025-03-26 DIAGNOSIS — Q87.11 PRADER-WILLI SYNDROME (HHS-HCC): ICD-10-CM

## 2025-03-26 DIAGNOSIS — E78.00 HYPERCHOLESTEROLEMIA: Primary | ICD-10-CM

## 2025-03-26 DIAGNOSIS — B00.9 RECURRENT HSV (HERPES SIMPLEX VIRUS): ICD-10-CM

## 2025-03-26 DIAGNOSIS — E55.9 VITAMIN D DEFICIENCY: ICD-10-CM

## 2025-03-26 PROCEDURE — 99214 OFFICE O/P EST MOD 30 MIN: CPT | Performed by: STUDENT IN AN ORGANIZED HEALTH CARE EDUCATION/TRAINING PROGRAM

## 2025-03-26 PROCEDURE — 1036F TOBACCO NON-USER: CPT | Performed by: STUDENT IN AN ORGANIZED HEALTH CARE EDUCATION/TRAINING PROGRAM

## 2025-03-26 RX ORDER — ERGOCALCIFEROL 1.25 MG/1
50000 CAPSULE ORAL WEEKLY
Qty: 12 CAPSULE | Refills: 1 | Status: SHIPPED | OUTPATIENT
Start: 2025-03-26 | End: 2025-09-10

## 2025-03-26 RX ORDER — VALACYCLOVIR HYDROCHLORIDE 500 MG/1
500 TABLET, FILM COATED ORAL DAILY
Qty: 90 TABLET | Refills: 1 | Status: SHIPPED | OUTPATIENT
Start: 2025-03-26 | End: 2025-09-22

## 2025-03-26 RX ORDER — KETOCONAZOLE 20 MG/ML
SHAMPOO, SUSPENSION TOPICAL
Qty: 120 ML | Refills: 3 | Status: SHIPPED | OUTPATIENT
Start: 2025-03-26

## 2025-03-26 NOTE — PROGRESS NOTES
35-year-old male presenting for virtual visit for 6-month follow-up of multiple concerns.  Stable, doing relatively well.  Currently having gastroenteritis, did not want to come into our office.    Hyperlipidemia  Stable, currently not medicated     Hyperglycemia  Stable, not currently medicated     Anemia  Stable     Vitamin D deficiency  Currently taking 50,000 units daily     Prader-Willi syndrome  Follows with [facility]    Seborrheic dermatitis  Stable on current regimen    Recurrent HSV  Stable on current regimen    Excessive daytime sleepiness  Snores daily.  Sleep study about 5 years ago was negative.  Status worsening.    Ataxia  Mother reports abnormal gait like he is tripping over his feet for over a month.  Stable.  She does have some concern that it is behavioral.    12 point ROS reviewed and negative other than as stated in HPI     General: Alert, oriented, pleasant, in no acute distress  HEENT:      Head: normocephalic, atraumatic;      eyes: EOMI, no scleral icterus;   Respiratory: No labored breathing  Psych: Appropriate mood and affect  Derm: No rashes visualized    PE limited by video conference     # Hyperlipidemia  -no medication currently  - Recent lipids satisfactory     #Prediabetes  -A1c 5.7     #History of anemia  - Borderline, stable     #Vitamin D deficiency  - Recommend OTC vitamin D3, 2000 units daily     # [facility]Willi syndrome  [facility]    #Seborrheic dermatitis  - Rx ketoconazole shampoo    #Excoriations  - Rx mupirocin     # Recurrent HSV  - Continue valacyclovir     #Ataxia  -Neurology referral     F/U 6 months, sooner if indicated     Chris D'Amico, DO    This visit was completed via virtual visit. All issues as above were discussed and addressed but no/some physical exam was performed. If it was felt that the patient should be evaluated in clinic, then they were directed there. The patient verbally consented to visit.

## 2025-03-30 NOTE — PROGRESS NOTES
Physical Therapy  Physical Therapy Orthopedic Evaluation    Patient Name: Lonny Andrews  MRN: [id number]  Today's Date: 4/1/2025  Time Calculation  Start Time: 0915  Stop Time: 1000  Time Calculation (min): 45 min    Referring Physician: Dr. Santa Oconnor  Visit #: 1 of 30  Insurance: Medicaid, no auth, no copay      Current Problem  1. Prader-Willi syndrome (HHS-HCC)  Referral to Physical Therapy    Follow Up In Physical Therapy      2. Unsteady gait when walking  Referral to Physical Therapy    Follow Up In Physical Therapy      3. Generalized joint pain  Referral to Physical Therapy    Follow Up In Physical Therapy          Medical history form reviewed: Yes  DOI: 2/21/25    Subjective:   Patient with hx of Prader-Armaan syndrome(genetic, chromosomal disorder causing hypotonia, unsteady gait, diffuse joint pain and behavioral issues). Patient's mother states symptoms have been worse in the last year. Most recent complaint of pain B ankles, lumbar spine and L elbow. Referred to PT for aquatic therapy.   Currently, symptoms due to fatigue, weakness and non-use.    Precautions  STEADI Fall Risk Score (The score of 4 or more indicates an increased risk of falling): 7  Pain:  0-10 (Numeric) Pain Score: 8    Pain Exacerbating Factors: sit to stand, going down stairs    Pain Relieving Factors: Tumeric, kumbucha    Imaging completed: X-ray: ankle, elbow negative, Lumbar spine: mild DJD L5-S1    Exercise:  exercise with videos 2-3x/wk    Patient Goals for Treatment: decrease pain    Work Status: not working  Current status (improving, unchanged, worsening): unchanged    Current Level of Function: 100% with some pain    Patient aware of diagnosis and prognosis: Yes    Living Environment: home  Stairs: slowly  Social Support: Lives with mom, sister and nephews    Language: English  Medical History Form: Reviewed (scanned into chart)          Objective:  Posture: R shoulder higher, pronation B  Palpation: No significant  pain to palpation over spine, shoulders, hips or knees  Gait: proper heel strike/toe off. Pronation  Balance: SLS x10 sec R/L  C-AROM: WNL  UE AROM: WNL  UE Strength: 4 proximal, 4+ distal  Scapular strength: 4 B  L-AROM: WNL  LE AROM: WNL  LE Strength: 4 proximal, 4+ distal  LE flexibility: tight hamstring and gastroc B  Core Strength: 3+    Outcome Measures:  Other Measures  Lower Extremity Funtional Score (LEFS): 63/80             EDUCATION: home exercise program, plan of care, activity modifications, pain management, and injury pathology       Goals:  Active       PT Problem       STG       Start:  04/01/25    Expected End:  06/30/25       STG's to be achieved in 6 weeks    1. Decrease joint pain 50% with activity  2. Increase  LEFS by 6 points to help improve ADL's  3. Increase strength 1/2 muscle grade to help improve endurance  4. Increase hamstring/calf ROM by 10% for improved daily function  5. Patient able to participate in physical activity x45 min without increase in symptoms  6. Demonstrate proper posture with exercise           LTG       Start:  04/01/25    Expected End:  06/30/25       LTG's to be achieved in 12 weeks    1. Decrease joint pain to tolerable with activity  2. Increase LEFS by 9 points to help improve ADL's  3. Increase strength 1 muscle grade to help improve endurance  4. Increase hamstring/calf ROM by 50% for improved daily function  5. Patient able to ambulate >30 min with 50% less pain  6. Patient to go to local gym at least 1x/wk  7. Patient to be independent in daily HEP                      Treatments:   Patient instructed in HEP.   Access Code: [email or web address]  URL: [email or web address]  Date: 04/01/2025  Prepared by: Darleen Dow    Exercises  - hamstring stretch long sit  - 1 x daily - 7 x weekly - 2 reps - 20 hold  - Gastroc Stretch on Wall  - 1 x daily - 7 x weekly - 2 reps - 20 hold  - Scapular rows  - 1 x daily - 7 x weekly - 20 reps  - scapular extension  -  1 x daily - 7 x weekly - 3 sets - 20 reps  Patient provided with written HEP.  Patient shown pool area and locker room. Explained cancellation and no show policy.     Charges: 1 eval-low, 1 TE  Clinical presentation: stable    Assessment: Patient is a 35 y.o. y/o male  with complaint of diffuse joint pain and unsteady gait. Patient presents with impaired posture, weakness and decreased endurance. Pt would benefit from physical therapy to address the impairments found & listed previously in the objective section in order to return to safe and pain-free ADLs and prior level of function.       Plan:   Rehab Potential: Good  Plan of Care Agreement: Patient, Parent  Planned Interventions include: therapeutic exercise, self-care home management, manual therapy, therapeutic activities, gait training, neuromuscular coordination, aquatic therapy  Frequency: 1x/wk  Duration: 12 wks        Darleen Dow, PT, OCS

## 2025-04-01 ENCOUNTER — EVALUATION (OUTPATIENT)
Dept: PHYSICAL THERAPY | Facility: CLINIC | Age: 35
End: 2025-04-01
Payer: MEDICAID

## 2025-04-01 DIAGNOSIS — R26.81 UNSTEADY GAIT WHEN WALKING: ICD-10-CM

## 2025-04-01 DIAGNOSIS — Q87.11 PRADER-WILLI SYNDROME (HHS-HCC): ICD-10-CM

## 2025-04-01 DIAGNOSIS — M25.50 GENERALIZED JOINT PAIN: ICD-10-CM

## 2025-04-01 PROCEDURE — 97110 THERAPEUTIC EXERCISES: CPT | Mod: GP

## 2025-04-01 PROCEDURE — 97161 PT EVAL LOW COMPLEX 20 MIN: CPT | Mod: GP

## 2025-04-01 ASSESSMENT — ENCOUNTER SYMPTOMS
OCCASIONAL FEELINGS OF UNSTEADINESS: 1
LOSS OF SENSATION IN FEET: 0
DEPRESSION: 1

## 2025-04-01 ASSESSMENT — PAIN SCALES - GENERAL: PAINLEVEL_OUTOF10: 8

## 2025-04-08 ENCOUNTER — TELEPHONE (OUTPATIENT)
Dept: OTHER | Age: 35
End: 2025-04-08

## 2025-04-08 ENCOUNTER — TREATMENT (OUTPATIENT)
Dept: PHYSICAL THERAPY | Facility: CLINIC | Age: 35
End: 2025-04-08
Payer: MEDICAID

## 2025-04-08 DIAGNOSIS — Q87.11 PRADER-WILLI SYNDROME (HHS-HCC): Primary | ICD-10-CM

## 2025-04-08 DIAGNOSIS — R26.81 UNSTEADY GAIT WHEN WALKING: ICD-10-CM

## 2025-04-08 DIAGNOSIS — M25.50 GENERALIZED JOINT PAIN: ICD-10-CM

## 2025-04-08 PROCEDURE — 97113 AQUATIC THERAPY/EXERCISES: CPT | Mod: GP,CQ | Performed by: SPECIALIST/TECHNOLOGIST

## 2025-04-08 ASSESSMENT — PAIN - FUNCTIONAL ASSESSMENT: PAIN_FUNCTIONAL_ASSESSMENT: 0-10

## 2025-04-08 ASSESSMENT — PAIN SCALES - GENERAL: PAINLEVEL_OUTOF10: 8

## 2025-04-08 NOTE — PROGRESS NOTES
Physical Therapy  Physical Therapy Treatment    Patient Name: Lonny Andrews  MRN: [id number]  Today's Date: 4/8/2025  Time Calculation  Start Time: 0950  Stop Time: 1040  Time Calculation (min): 50 min    Insurance:  Visit number: 2 of med nec  Authorization info: No Auth needed  Insurance Type: Medicaid              General       Current Problem  1. Prader-Willi syndrome (HHS-HCC)        2. Unsteady gait when walking        3. Generalized joint pain            Precautions  STEADI Fall Risk Score (The score of 4 or more indicates an increased risk of falling): 7    Pain Assessment: 0-10  0-10 (Numeric) Pain Score: 8    Subjective:   Patient arrived full weight bearing without a limp.    HEP Performed:  Yes    Objective:   Slightly forward      Treatments:   Pool Depth: 4 foot, Temperature 92°  Forward/retro walk 5'   Side Step 4'   MIP 5'  Alt Hams 4'  HR/TR 3'  Hip ext R/L 2' each   Hip abd/add R/L 2' each   1/4 squats 2'    Floats: Shoulder extension R/L - 2' wide base 2' narrow base    Standing HF stretch R/L 1'  Standing Hams stretch on stair R/L 1'    Access Code: [email or web address]     Charges: AQ x3    Access Code: [email or web address]  URL: [email or web address]  Date: 04/08/2025  Prepared by: Win Loja    Exercises  - hamstring stretch long sit  - 1 x daily - 7 x weekly - 2 reps - 20 hold  - Gastroc Stretch on Wall  - 1 x daily - 7 x weekly - 2 reps - 20 hold  - Scapular rows  - 1 x daily - 7 x weekly - 20 reps  - scapular extension  - 1 x daily - 7 x weekly - 3 sets - 20 reps  - Water walking Fwd and Bkwd   - Lateral side step walking   - March in Place   - Alternating leg lifts out to side   - Hamstring curls    - Squat   - Standing Hip Circles at Pool Wall   - Hamstring stretch at pool wall or on pool step     Assessment: Patient tolerated intensity noting zero pain post treatment.        Plan: Continue aquatic therapy 4-6 weeks followed by land re-check.      Win Loja, PTA

## 2025-04-11 ENCOUNTER — TELEPHONE (OUTPATIENT)
Dept: ENDOCRINOLOGY | Facility: CLINIC | Age: 35
End: 2025-04-11
Payer: MEDICAID

## 2025-04-13 NOTE — PROGRESS NOTES
Physical Therapy  Physical Therapy Treatment    Patient Name: Lonny Andrews  MRN: [id number]  Today's Date: 4/15/2025  Time Calculation  Start Time: 1045  Stop Time: 1130  Time Calculation (min): 45 min    Insurance:  Visit number: 3 of [facility]  Authorization info: No Auth needed  Insurance Type: Medicaid              General       Current Problem  1. Prader-Willi syndrome (HHS-HCC)        2. Unsteady gait when walking        3. Generalized joint pain              Precautions  STEADI Fall Risk Score (The score of 4 or more indicates an increased risk of falling): 7    0-10 (Numeric) Pain Score: 7    Subjective:   Patient states he has pain B lower legs.  HEP Performed:  Yes, with encouragement from mom.    Objective:   Alternate feet up/down stairs of pool  Decreased heel strike/toe off     Treatments:   Pool Depth: 4 foot, Temperature 92°  Forward/retro walk 5'   Side Step 4'   MIP 5'  Pool Depth 3 foot  HR/TR 3'  Hip abd/add R/L 5' each   1/4 squats 3'  Floats: Shoulder extension R/L -  2' narrow base  Floats: horizontal ABD B- 2' ea tandem stance R/L     Standing HF stretch R/L 1'  Standing Hams stretch on stair R/L 1'    Access Code: [email or web address]     Charges: AQ x3    Access Code: [email or web address]  URL: [email or web address]  Date: 04/08/2025  Prepared by: Win Loja    Exercises  - hamstring stretch long sit  - 1 x daily - 7 x weekly - 2 reps - 20 hold  - Gastroc Stretch on Wall  - 1 x daily - 7 x weekly - 2 reps - 20 hold  - Scapular rows  - 1 x daily - 7 x weekly - 20 reps  - scapular extension  - 1 x daily - 7 x weekly - 3 sets - 20 reps    Assessment: Patient had a little more control in the 3ft depth, and had no increase pain. Recommend performing all exercise in 3 feet.      Plan:   Patient to try and resume [employer] video workouts at home. Continue aquatic exercise as tolerated.    Darleen Dow, PT

## 2025-04-14 ENCOUNTER — TELEPHONE (OUTPATIENT)
Dept: BEHAVIORAL HEALTH | Facility: CLINIC | Age: 35
End: 2025-04-14
Payer: MEDICAID

## 2025-04-14 NOTE — PROGRESS NOTES
Medication has been approved and is ready for  today mom has been informed    PROVIDER: [facility]  MEDICATION/DOSAGE: brand adderall 30mg XR  QTY/SUPPLY: 30/30  PRIOR AUTH COMPLETED VIA:  INSURANCE:  [employer]  REF #/KEY:   STATUS pending  BIN# [id number]  ID# [id number]      PROVIDER: [facility]  MEDICATION/DOSAGE: Adderall 5mg XR  QTY/SUPPLY:  PRIOR AUTH COMPLETED VIA:  INSURANCE:  [employer]   REF #/KEY:  STATUS pending  BIN# [id number]  ID# [id number]

## 2025-04-15 ENCOUNTER — TREATMENT (OUTPATIENT)
Dept: PHYSICAL THERAPY | Facility: CLINIC | Age: 35
End: 2025-04-15
Payer: MEDICAID

## 2025-04-15 DIAGNOSIS — R26.81 UNSTEADY GAIT WHEN WALKING: ICD-10-CM

## 2025-04-15 DIAGNOSIS — Q87.11 PRADER-WILLI SYNDROME (HHS-HCC): Primary | ICD-10-CM

## 2025-04-15 DIAGNOSIS — M25.50 GENERALIZED JOINT PAIN: ICD-10-CM

## 2025-04-15 PROCEDURE — 97113 AQUATIC THERAPY/EXERCISES: CPT | Mod: GP

## 2025-04-15 ASSESSMENT — PAIN SCALES - GENERAL: PAINLEVEL_OUTOF10: 7

## 2025-04-17 DIAGNOSIS — F90.2 ATTENTION DEFICIT HYPERACTIVITY DISORDER (ADHD), COMBINED TYPE: ICD-10-CM

## 2025-04-17 DIAGNOSIS — F06.31 MOOD DISORDER WITH DEPRESSIVE FEATURES DUE TO GENERAL MEDICAL CONDITION: ICD-10-CM

## 2025-04-17 DIAGNOSIS — F43.10 PTSD (POST-TRAUMATIC STRESS DISORDER): ICD-10-CM

## 2025-04-17 RX ORDER — DEXTROAMPHETAMINE SULFATE, DEXTROAMPHETAMINE SACCHARATE, AMPHETAMINE SULFATE AND AMPHETAMINE ASPARTATE 7.5; 7.5; 7.5; 7.5 MG/1; MG/1; MG/1; MG/1
30 CAPSULE, EXTENDED RELEASE ORAL EVERY MORNING
Qty: 30 CAPSULE | Refills: 0 | Status: SHIPPED | OUTPATIENT
Start: 2025-05-17 | End: 2025-06-16

## 2025-04-17 RX ORDER — DEXTROAMPHETAMINE SULFATE, DEXTROAMPHETAMINE SACCHARATE, AMPHETAMINE SULFATE AND AMPHETAMINE ASPARTATE 7.5; 7.5; 7.5; 7.5 MG/1; MG/1; MG/1; MG/1
30 CAPSULE, EXTENDED RELEASE ORAL EVERY MORNING
Qty: 30 CAPSULE | Refills: 0 | Status: SHIPPED | OUTPATIENT
Start: 2025-04-17 | End: 2025-05-17

## 2025-04-17 RX ORDER — DEXTROAMPHETAMINE SULFATE, DEXTROAMPHETAMINE SACCHARATE, AMPHETAMINE SULFATE AND AMPHETAMINE ASPARTATE 1.25; 1.25; 1.25; 1.25 MG/1; MG/1; MG/1; MG/1
CAPSULE, EXTENDED RELEASE ORAL
Qty: 30 CAPSULE | Refills: 0 | Status: SHIPPED | OUTPATIENT
Start: 2025-04-17

## 2025-04-17 RX ORDER — DEXTROAMPHETAMINE SULFATE, DEXTROAMPHETAMINE SACCHARATE, AMPHETAMINE SULFATE AND AMPHETAMINE ASPARTATE 7.5; 7.5; 7.5; 7.5 MG/1; MG/1; MG/1; MG/1
30 CAPSULE, EXTENDED RELEASE ORAL EVERY MORNING
Qty: 30 CAPSULE | Refills: 0 | Status: SHIPPED | OUTPATIENT
Start: 2025-06-16 | End: 2025-07-16

## 2025-04-17 RX ORDER — SERTRALINE HYDROCHLORIDE 50 MG/1
50 TABLET, FILM COATED ORAL DAILY
Qty: 30 TABLET | Refills: 3 | Status: SHIPPED | OUTPATIENT
Start: 2025-04-17 | End: 2025-08-15

## 2025-04-17 RX ORDER — PRAZOSIN HYDROCHLORIDE 2 MG/1
4 CAPSULE ORAL NIGHTLY
Qty: 60 CAPSULE | Refills: 3 | Status: SHIPPED | OUTPATIENT
Start: 2025-04-17

## 2025-04-17 RX ORDER — DEXTROAMPHETAMINE SULFATE, DEXTROAMPHETAMINE SACCHARATE, AMPHETAMINE SULFATE AND AMPHETAMINE ASPARTATE 1.25; 1.25; 1.25; 1.25 MG/1; MG/1; MG/1; MG/1
CAPSULE, EXTENDED RELEASE ORAL
Qty: 30 CAPSULE | Refills: 0 | Status: SHIPPED | OUTPATIENT
Start: 2025-05-17

## 2025-04-17 RX ORDER — DEXTROAMPHETAMINE SULFATE, DEXTROAMPHETAMINE SACCHARATE, AMPHETAMINE SULFATE AND AMPHETAMINE ASPARTATE 1.25; 1.25; 1.25; 1.25 MG/1; MG/1; MG/1; MG/1
CAPSULE, EXTENDED RELEASE ORAL
Qty: 30 CAPSULE | Refills: 0 | Status: SHIPPED | OUTPATIENT
Start: 2025-06-16

## 2025-04-17 RX ORDER — SERTRALINE HYDROCHLORIDE 100 MG/1
200 TABLET, FILM COATED ORAL DAILY
Qty: 60 TABLET | Refills: 3 | Status: SHIPPED | OUTPATIENT
Start: 2025-04-17 | End: 2025-08-15

## 2025-04-17 RX ORDER — ARIPIPRAZOLE 400 MG
400 KIT INTRAMUSCULAR
Qty: 1 EACH | Refills: 3 | Status: SHIPPED | OUTPATIENT
Start: 2025-04-17

## 2025-04-17 NOTE — PROGRESS NOTES
1. Continue Adderall XR 5 mg by mouth at noon as booster for ADHD. Not able to get name brand in IR. Therefore, XR ordered.  2. Continue Adderall XR 30 mg by mouth daily for ADHD- must be name brand. I have personally reviewed the OARRS report 4/17/25. I have considered the risks of abuse, dependence, addiction and diversion.  Stim agreement signed 7/3/2024  Urine amphetamine and drug screen 12/27/24 in office

## 2025-04-22 ENCOUNTER — APPOINTMENT (OUTPATIENT)
Dept: BEHAVIORAL HEALTH | Facility: CLINIC | Age: 35
End: 2025-04-22
Payer: MEDICAID

## 2025-04-22 VITALS
SYSTOLIC BLOOD PRESSURE: 125 MMHG | TEMPERATURE: 97.8 F | RESPIRATION RATE: 18 BRPM | WEIGHT: 150.2 LBS | DIASTOLIC BLOOD PRESSURE: 82 MMHG | BODY MASS INDEX: 31.39 KG/M2 | HEART RATE: 80 BPM

## 2025-04-22 DIAGNOSIS — F06.31 MOOD DISORDER WITH DEPRESSIVE FEATURES DUE TO GENERAL MEDICAL CONDITION: Primary | ICD-10-CM

## 2025-04-22 ASSESSMENT — COLUMBIA-SUICIDE SEVERITY RATING SCALE - C-SSRS
6. HAVE YOU EVER DONE ANYTHING, STARTED TO DO ANYTHING, OR PREPARED TO DO ANYTHING TO END YOUR LIFE?: NO
1. HAVE YOU WISHED YOU WERE DEAD OR WISHED YOU COULD GO TO SLEEP AND NOT WAKE UP?: NO
ATTEMPT LIFETIME: NO
2. HAVE YOU ACTUALLY HAD ANY THOUGHTS OF KILLING YOURSELF?: NO
TOTAL  NUMBER OF INTERRUPTED ATTEMPTS SINCE LAST CONTACT: NO
TOTAL  NUMBER OF INTERRUPTED ATTEMPTS LIFETIME: NO
SUICIDE, SINCE LAST CONTACT: NO
6. HAVE YOU EVER DONE ANYTHING, STARTED TO DO ANYTHING, OR PREPARED TO DO ANYTHING TO END YOUR LIFE?: NO
ATTEMPT SINCE LAST CONTACT: NO
TOTAL  NUMBER OF ABORTED OR SELF INTERRUPTED ATTEMPTS LIFETIME: NO
1. SINCE LAST CONTACT, HAVE YOU WISHED YOU WERE DEAD OR WISHED YOU COULD GO TO SLEEP AND NOT WAKE UP?: NO
2. HAVE YOU ACTUALLY HAD ANY THOUGHTS OF KILLING YOURSELF?: NO
TOTAL  NUMBER OF ABORTED OR SELF INTERRUPTED ATTEMPTS SINCE LAST CONTACT: NO

## 2025-04-22 ASSESSMENT — PAIN SCALES - GENERAL: PAINLEVEL_OUTOF10: 0-NO PAIN

## 2025-04-22 NOTE — PROGRESS NOTES
Patient here at the clinic today to receive his monthly Abilify Maintena 400mg for Mood disorder with depressive features      Patient identified by full name and  and vitals obtained. patient last seen by provider 3/19/25 , last seen by nurse on 3/25/25  Medication verified by providers note and medication order       Appetite: no change  Sleep: No change  Appearance: clean, age appropriate, well-groomed  Build: average  Attitude: cooperative, calm, pleasant, friendly, open  Eye Contact: normal  Activity: alert, attentive, appropriate  Speech: appropriate & spontaneous, normal  Delusions: patient denies   Thought Content: logical  Thought Process: logical  Judgement/insight: Fair  Mood: calm happy  Affect: appropriate  AH/VH/SI/HI: patient denies  Access to firearms/weapons: No  Depression: patient denies  Thoughts of hopelessness: Patient denies  Anxiety: patient denies  Self-injurious behavior: patient denies  Cravings/Urges: NA  Last use: NA  Tobacco Use: non-smoker  Spiritual or cultural practices that may affect your care or impact your health care decisions: no  Living situation lives with mom whom is his guardian   Employed: No        Patient here at the clinic today to receive his monthly Abilify Maintena for Mood disorder accompanied by his mother. Patient was cooperative and engaged during this visit. Per mom there has been no issues with previous injection and denies any issues with SI/HI, VH/AH, depression and no recent hospitalizations      Patient received injection of Abilify Maintena 400mg/2ml via 23 gauge w/o incident into left   deltoid area. Patient tolerated injection well, no reaction at injection site.     RN provided education on medications sided effects, the importance of reporting any changes in the injection site, for minor arm pain utilization of ibuprofen and a warm compress should relive any discomfort.  RN has provided patient and mom direct office number for future questions and  the mobile crisis number for any emergent concerns that may arise.      LOT # [id number]  EXP:  SEP 2027  NDC: 85698-211-30        Patient will RTC in 4 weeks     VIANNEY SiddiquiN

## 2025-04-23 ENCOUNTER — APPOINTMENT (OUTPATIENT)
Dept: OPHTHALMOLOGY | Facility: CLINIC | Age: 35
End: 2025-04-23
Payer: MEDICAID

## 2025-04-23 DIAGNOSIS — H40.053 OCULAR HYPERTENSION, BILATERAL: Primary | ICD-10-CM

## 2025-04-23 PROCEDURE — 76514 ECHO EXAM OF EYE THICKNESS: CPT | Performed by: STUDENT IN AN ORGANIZED HEALTH CARE EDUCATION/TRAINING PROGRAM

## 2025-04-23 PROCEDURE — 99213 OFFICE O/P EST LOW 20 MIN: CPT | Performed by: STUDENT IN AN ORGANIZED HEALTH CARE EDUCATION/TRAINING PROGRAM

## 2025-04-23 PROCEDURE — 92083 EXTENDED VISUAL FIELD XM: CPT | Performed by: STUDENT IN AN ORGANIZED HEALTH CARE EDUCATION/TRAINING PROGRAM

## 2025-04-23 RX ORDER — NETARSUDIL AND LATANOPROST OPHTHALMIC SOLUTION, 0.02%/0.005% .2; .05 MG/ML; MG/ML
1 SOLUTION/ DROPS OPHTHALMIC; TOPICAL NIGHTLY
Qty: 2.5 ML | Refills: 3 | Status: SHIPPED | OUTPATIENT
Start: 2025-04-23 | End: 2025-07-22

## 2025-04-23 ASSESSMENT — VISUAL ACUITY
OD_SC: 20/40
OD_PH_SC: 20/30
OS_SC: 20/40
METHOD: SNELLEN - LINEAR
OD_SC+: +2
OS_PH_SC: 20/30

## 2025-04-23 ASSESSMENT — PACHYMETRY
OD_CT(UM): 524
OS_CT(UM): 452

## 2025-04-23 ASSESSMENT — REFRACTION_MANIFEST
OD_CYLINDER: -0.75
OS_AXIS: 060
OD_AXIS: 160
OS_SPHERE: -1.00
OS_CYLINDER: -0.50
OD_SPHERE: -0.50

## 2025-04-23 ASSESSMENT — EXTERNAL EXAM - LEFT EYE: OS_EXAM: NORMAL

## 2025-04-23 ASSESSMENT — SLIT LAMP EXAM - LIDS
COMMENTS: NORMAL
COMMENTS: NORMAL

## 2025-04-23 ASSESSMENT — TONOMETRY
IOP_METHOD: GOLDMANN APPLANATION
OS_IOP_MMHG: 27
OD_IOP_MMHG: 23

## 2025-04-23 ASSESSMENT — CUP TO DISC RATIO
OD_RATIO: .50
OS_RATIO: .50

## 2025-04-23 ASSESSMENT — EXTERNAL EXAM - RIGHT EYE: OD_EXAM: NORMAL

## 2025-04-23 NOTE — PROGRESS NOTES
Assessment/Plan   Diagnoses and all orders for this visit:  Ocular hypertension, bilateral  -IOP today 23/27 c TMAX 25/42 c thinner PACHS 524/490  -latanoprost QHS OU started at exam 12/19/24-pt mother reports compliance  -gonio 12/19/24 does not show signs of angle closure or previous angle closure attacks  -no anterior chamber reaction (-)glaucomatocyclitc crisis  -ONH appearance with distinct NRR  -Baseline OCT RNFL today 12/19/24 within normal limits for both eyes  -Baseline HVF 24-2 today with high fixation losses, ? Reliability OD: SN/IN defects OS: IN and central defects   -Tgoal OD 20 OS 26  -will switch latanoprost QHS OU to Rocklatan QHS OU  -RTC IOP check 3 months     RTC 3 months for IOP check

## 2025-04-24 ENCOUNTER — APPOINTMENT (OUTPATIENT)
Dept: GENETICS | Facility: CLINIC | Age: 35
End: 2025-04-24
Payer: MEDICAID

## 2025-04-24 ENCOUNTER — TELEPHONE (OUTPATIENT)
Dept: GENETICS | Facility: CLINIC | Age: 35
End: 2025-04-24

## 2025-04-24 ENCOUNTER — TELEMEDICINE CLINICAL SUPPORT (OUTPATIENT)
Dept: GENETICS | Facility: CLINIC | Age: 35
End: 2025-04-24
Payer: MEDICAID

## 2025-04-24 ENCOUNTER — TREATMENT (OUTPATIENT)
Dept: PHYSICAL THERAPY | Facility: CLINIC | Age: 35
End: 2025-04-24
Payer: MEDICAID

## 2025-04-24 VITALS
HEIGHT: 60 IN | SYSTOLIC BLOOD PRESSURE: 113 MMHG | HEART RATE: 70 BPM | WEIGHT: 154 LBS | TEMPERATURE: 97.3 F | DIASTOLIC BLOOD PRESSURE: 67 MMHG | BODY MASS INDEX: 30.23 KG/M2

## 2025-04-24 DIAGNOSIS — F42.4 SKIN PICKING HABIT: ICD-10-CM

## 2025-04-24 DIAGNOSIS — R73.03 PREDIABETES: ICD-10-CM

## 2025-04-24 DIAGNOSIS — K31.84 GASTROPARESIS: ICD-10-CM

## 2025-04-24 DIAGNOSIS — Q87.11 PRADER-WILLI SYNDROME (HHS-HCC): ICD-10-CM

## 2025-04-24 DIAGNOSIS — E78.00 HYPERCHOLESTEROLEMIA: ICD-10-CM

## 2025-04-24 DIAGNOSIS — E66.9 OBESITY (BMI 30-39.9): ICD-10-CM

## 2025-04-24 DIAGNOSIS — R26.81 UNSTEADY GAIT WHEN WALKING: ICD-10-CM

## 2025-04-24 DIAGNOSIS — Q87.11 PRADER-WILLI SYNDROME (HHS-HCC): Primary | ICD-10-CM

## 2025-04-24 DIAGNOSIS — R63.2 HYPERPHAGIA: Primary | ICD-10-CM

## 2025-04-24 DIAGNOSIS — R63.5 WEIGHT GAIN: ICD-10-CM

## 2025-04-24 DIAGNOSIS — M25.50 GENERALIZED JOINT PAIN: Primary | ICD-10-CM

## 2025-04-24 DIAGNOSIS — K31.89 GASTRIC DYSMOTILITY: ICD-10-CM

## 2025-04-24 PROCEDURE — 97113 AQUATIC THERAPY/EXERCISES: CPT | Mod: GP,CQ | Performed by: SPECIALIST/TECHNOLOGIST

## 2025-04-24 PROCEDURE — 3008F BODY MASS INDEX DOCD: CPT | Performed by: INTERNAL MEDICINE

## 2025-04-24 PROCEDURE — RXMED WILLOW AMBULATORY MEDICATION CHARGE

## 2025-04-24 PROCEDURE — 97803 MED NUTRITION INDIV SUBSEQ: CPT

## 2025-04-24 PROCEDURE — 99215 OFFICE O/P EST HI 40 MIN: CPT | Performed by: INTERNAL MEDICINE

## 2025-04-24 PROCEDURE — 1036F TOBACCO NON-USER: CPT | Performed by: INTERNAL MEDICINE

## 2025-04-24 ASSESSMENT — PATIENT HEALTH QUESTIONNAIRE - PHQ9
2. FEELING DOWN, DEPRESSED OR HOPELESS: NEARLY EVERY DAY
1. LITTLE INTEREST OR PLEASURE IN DOING THINGS: NOT AT ALL
SUM OF ALL RESPONSES TO PHQ9 QUESTIONS 1 AND 2: 3
9. THOUGHTS THAT YOU WOULD BE BETTER OFF DEAD, OR OF HURTING YOURSELF: NOT AT ALL

## 2025-04-24 ASSESSMENT — ENCOUNTER SYMPTOMS
OCCASIONAL FEELINGS OF UNSTEADINESS: 0
DEPRESSION: 0
LOSS OF SENSATION IN FEET: 0

## 2025-04-24 ASSESSMENT — PAIN SCALES - GENERAL: PAINLEVEL_OUTOF10: 8

## 2025-04-24 NOTE — PROGRESS NOTES
[facility]    Patient full name: Lonny Andrews  MRN: [id number]  YOB: 1990  Present during this visit: The patient and mother    Primary care provider: Christopher D'Amico, DO    Medical history was obtained from the patient and mother. Prior to this appointment, I reviewed medical records from outpatient medical records and scanned documents, if available.     Interval history:  Mr. Andrews is a 35 y.o. male who returned to Genetics clinic for PWS management. Since the last visit:  -N-acetylcystine was prescribed and the dose was titrated up slowly. The skin picking is considerably improving. Mother is satisfied.   -Saw PM&R 2/21/2025 - labs ordered  -Seeing PT later today. Has pain when walking.   -HCG, clomiphene, ozempic were denied by insurance.     The goal of today's visit is to discuss newly approved medication for hyperphagia in PWS.      Current med list:  Adderall  Rocklatan eye drop  Zoloft  Prazosin  Abilify  Valtrex  Vitamin D  N-acetylcysteine  Testosterone - not started    No drug allergy but reports some adverse reactions, mainly worsening behavioral issues, with some psychiatric medications.     Focussed physical exam:  No significant leg swelling. No pitting edema.   Lungs are clear.     Assessment:  Mr. Andrews is a 35 y.o. male who returned to Genetics clinic for PWS. The purpose of today's visit is to prescribe the newly approved medication, Vykat.       Information regarding Vykat XR discussed today:  -Vykat XR helps decrease hyperphagia. It is not a weight loss medication. We hope that with decreased hyperphagia, weight loss will be more effective and food-related behavioral challenges will be less observed.   -We will follow the HCQT scores as an objective marker of hyperphagia.   -It is taken once a day.   -Target dose based on his weight is 525mg. Will need to titrate the dose up slowly.   -Contraindication: Allergy to thiazide directics and  diazoxide.   -Common side effects based on the study:  1) Hypertrichosis (36%)  2) Edema (27%) including leg edema, pulmonary edema, and fluid retention at other body parts. Individuals with PWS may have baseline lymphedema due to obesity. Symptoms of pulmonary edema were discussed.   3) Hyperglycemia and development of diabetes (17%). It is recommended that blood sugar being monitored closely initially. A baseline HbA1C level is needed. Symptoms of hyperglycemia (polyphagia, polyuria, polydipsia) and DKA (nausea, vomiting, severe fatigue, dyspnea, abdominal pain) were discussed.   4) Rash (12%)  Management of these side effects include adjusting or temporarily stopping Vykat or adjusting the dose of anti-diabetic medications.   -Vykat has drug interactions with following medications: Strong CY Inhibitors, CY Substrates, Strong CY Inhibitors, Dual Strong CY / Moderate 1A2 Inducers. There are additional considerations if taken with warfarin and thiazide or other diuretics.      There is no drug interactions from the current med list.     He does not have DM and last HbA1C (2024) was 5.7. HbA1C and FBS need to be monitored and obtained at baseline. Mom has a glucose monitoring device at home.     Mother asked if there is any significant rebound hyperphagia if the drug is discontinued -- I'm not certain if that information is available from the clinical trial. Will reach out to the pharmaceutical company.     He has significant hyperphagia which complicates obesity, diabetes management, and results in significant food-related behavioral challenges. Therefore Vykat is clinically indicated for Mr. Andrews.     Plan:  -After the discussion, Mr. Andrews's mother provided a consent to proceed with getting this drug. Info re: the drug provided.   -The target dose is 375mg po daily.   150mg every day x2 weeks  225mg every day x2 weeks  300mg every day x2 weeks  375mg every day x2 weeks and onwards   -He  has a glucose monitoring device at home. Will need a baseline HbA1C and FSB. I have discussed with Endocrinology re: this medication and a potential side effect of hyperglycemia.   -A letter re: drug interactions for other providers to be given.   -[employer] application to be submitted for coverage. Once he has the medication, to get HbA1C/FBS and obtain a baseline HQCT score. To provide info on how to monitor blood sugar.      Follow-up 3 mo after the medication is started.      Carrington Forrester MD     Medical Geneticist  [facility]  Phone: [phone]  Fax: [phone]   Address: [address] [place] [address]  Time spent (this information is required by insurance): face-to-face 30min; preparation 5min; documentation 20min; completing the [employer] application form 10min; total time spent 65min

## 2025-04-24 NOTE — PROGRESS NOTES
Physical Therapy  Physical Therapy Treatment    Patient Name: Lonny Andrews  MRN: [id number]  Today's Date: 4/24/2025  Time Calculation  Start Time: 1615  Stop Time: 1700  Time Calculation (min): 45 min    Insurance:  Visit number: 4 of med nec  Authorization info: No Auth needed  Insurance Type: Medicaid              General       Current Problem  1. Generalized joint pain        2. Unsteady gait when walking        3. Prader-Willi syndrome (HHS-HCC)                Precautions  STEADI Fall Risk Score (The score of 4 or more indicates an increased risk of falling): 7    0-10 (Numeric) Pain Score: 8    Subjective:   Patient arrived full weight bearing without a limp.  Patient noted pain 8 of 10 on arrival.  Patient's mother notes  encouraged   HEP Performed:  Yes, with encouragement from mom.    Objective:   Alternate feet up/down stairs of pool  Decreased heel strike/toe off     Treatments:   Pool Depth: 4 foot, Temperature 92°  Forward/retro walk 5'   Side Step 4'   MIP 5'  Pool Depth 3 foot  HR/TR 3'  Hip abd/add R/L 5' each   1/4 squats 3'  Floats: Shoulder extension R/L -  2' narrow base  Floats: horizontal ABD B- 2' ea tandem stance R/L     Standing HF stretch R/L 1'  Standing Hams stretch on stair R/L 1'    Access Code: [email or web address]     Charges: AQ x3    Assessment: Patient tolerated intensity without problems.  Patient in 4 foot water (midway to lowest cross bar) and was able to perform exercises without problems.  Patient noted pain 0 of 10 post treatment.        Plan:   Continue aquatic exercise as tolerated.     Win Loja, PTA

## 2025-04-24 NOTE — PROGRESS NOTES
"[facility]   Nutrition Assessment     Date of Visit:  4/24/2025    Out-Patient Clinic:  Prader-Willi Syndrome   Type of OP Visit:  Follow-up via real-time TeleHealth Video Visit (pt in clinic with doctor and RD on video). Pts mom gave consent and pt ID  Reason for Nutrition Assessment:  Nutrition management for medical nutrition therapy for PWS complications      PROBLEM LIST/DX   Prader-Willi Syndrome                 (DX: Q87.11)  Hyperphagia   Obesity                Past Medical HX: reviewed and discussed history with team (see problem list)   Allergies: drug/food allergies reviewed and discussed with team (see allergy list)   Current Medications: medications/supplements reviewed and discussed with team (see medication list)   Biochemical and Diagnostic Testing: testing was reviewed and discussed with team      NUTRITION HISTORY AND INTAKE:   Pt and mom present in out-patient PWS clinic for a scheduled f/u clinic visit. Mom said that he has a strong food seeking behavior, no vitamins, isnt in a day or work program. Behavior has increased. Pt starts that he is in a \"bad mood\". [place]  with county. Telling mom to keep working at it. Mom can't find him a day program due to his behavior. Mom states that Lonny is stubborn. Going to PT.      Food Seeking Behavior:  strong food seeking behavior   Food Access Secured:  yes     DIET HISTORY AND DAILY NUTRITIONAL INTAKE:    Dietary Restrictions: 3461-0310 kcals - due to \"food seeking\" from nephews in the house   Vitamin/Mineral/Supplements: MVI   Feeding Route: all PO intake   Enteral Feedings: none    Kcal Counting: goal is 1500 daily     24 hr Diet Recall:   Location of Meals:  living room   Breakfast: cereal and milk, sausage, biscuit   Lunch:  salad, water   Dinner: turkey burger, sweet potato, green beans   Snacks/Beverages: carrots, celery sticks, apples, peaches, grapes     Daily Activities:  stopped exercise \"refused\"   "   Anthropometrics:    Weight:  69.9 kg  - 154 lbs  (70.8 kg -155 lbs  (61 kg - 135 lbs) in 17 months)    Height:  4'11   BMI:  32    IBW: 50 kg = 140% above IBW     Classifications of Obesity:    *Class I: BMI >95% or BMI >30    Class II: BMI >120% or BMI >35    Class III: BMI >140% or BMI >40     Obesity Classification Assessment:   overweight      Prader-Willi Syndrome Energy Requirements:   (Height: 147 cm)    Wgt Maintenance: 10-11 kcals/cm height = 6360-4997 kcals/day/wgt maintenance    Wgt Slow Reduction: 8.5 kcals/cm height = 9531-2538 kcals/day/slow wgt loss   Wgt Speeder Reduction: 7 kcals/cm height = 800-906 kcals/faster wgt loss      Est. Daily DRI Nutrition/Disease Reference Point/Standard Needs:    FLUID Needs: 2200 mL    ENERGY Needs: 4578-5927 kg    DRI PROTEIN Needs: 70 grams     Adequate Nutrient Intake/Growth and Assessment Compared to Standards/Needs:   Meeting intake compared to needs by >100%, pt continually working on goals by next nutrition reassessment as outlined in the Nutrition Plan of Care Process/Disease Standards per age/disease processes standard needs      PES/DX Statement: Excessive energy intake related to PWS as evidenced by genetic testing      NUTRITION ASSESSMENT   Lonny is a 36 yo with PWS. Pt have lost 1 lb in 6 months due to not exercising.        Nutrition Status:  High nutritional risk related to obesity health complications. RD will continue to monitor, evaluate and reassess nutritional status as needed. Nutrition Plan of Care was discussed with the team      NUTRITION INTERVENTION AND PLAN:   Consider Vykat XR for hyperphagia   Limit daily calories: 1200 calories   Diet Principals:  higher protein, carb control, calorie restriction   Protein: 70 grams protein and 120 grams Carbs daily   Fluids: 2124 ounces daily (72 oz)  Continue Complete Multi-Vitamins with iron daily  (no gummy)   Add 1000 IU Vitamin D daily and 500 mg Calcium, twice daily   Continue to keep a daily  food log to count calories   Increase Physical Activity to 60 mins daily (15 mins/4 times daily) with resistance training   Fiber Goal: 20 grams daily   Check weight at home weekly and record          Contact the dietitian with any questions or concerns with diet [phone]  Follow up in Prader-Willi Clinic in 12 months or when recommended by PWS Team      NUTRITION MONITORING, EVALUATION AND GOALS   Nutrition related clinical history with intake compared to needs    Evaluate anthropometric measures and z-scores with previous status and reference standards    Biochemical data, medical tests and procedure findings    Nutrition Related malnutrition indicators and NFPE status      After Visit Summary: all patient/families questions were answered; plan, goals and follow-up were discussed and agreed upon with care/treatment team and patient/family.   Dietitian Follow-up: dietitian will continue to follow and reassess as needed or requested  Face-to-Face Time and Units: Time: 30 minutes/Units: 2     Felipa Weiss MS, RD, LDN l Sr. Metabolic Genetics Dietitian l Clinical Dietitian, Advance Practice l NPI: [id number] l [facility]   [facility]  Genetics Dietitian's Office: [phone]; Fax: [phone]; Email: [email or web address]   Genetics/Scheduling: [phone]; Fax: [phone]; Urgent Doctor On-Call: [phone] [address] [place] [address]   Clinical  Provider: Carrington Forrester MD  .

## 2025-04-24 NOTE — LETTER
04/24/25    [place]  [address] [address]  [place] [address]      Dear Dr. Christopher D'Amico, DO,    I am writing to confirm that your patient, Lonny Andrews  received care in my office on 04/24/25. I have enclosed a summary of the care provided to Lonny for your reference.    Please contact me with any questions you may have regarding the visit.    Sincerely,         Carrington oFrrester [place]  [address] [address]  [place] [address]    CC: No Recipients

## 2025-04-25 ENCOUNTER — APPOINTMENT (OUTPATIENT)
Dept: BEHAVIORAL HEALTH | Facility: CLINIC | Age: 35
End: 2025-04-25
Payer: MEDICAID

## 2025-04-25 ENCOUNTER — PHARMACY VISIT (OUTPATIENT)
Dept: PHARMACY | Facility: CLINIC | Age: 35
End: 2025-04-25
Payer: COMMERCIAL

## 2025-04-25 VITALS
WEIGHT: 155.25 LBS | DIASTOLIC BLOOD PRESSURE: 74 MMHG | BODY MASS INDEX: 30.48 KG/M2 | SYSTOLIC BLOOD PRESSURE: 110 MMHG | HEART RATE: 74 BPM | TEMPERATURE: 97.6 F | HEIGHT: 60 IN

## 2025-04-25 DIAGNOSIS — F43.10 PTSD (POST-TRAUMATIC STRESS DISORDER): ICD-10-CM

## 2025-04-25 DIAGNOSIS — F90.2 ATTENTION DEFICIT HYPERACTIVITY DISORDER (ADHD), COMBINED TYPE: Primary | ICD-10-CM

## 2025-04-25 DIAGNOSIS — F06.31 MOOD DISORDER WITH DEPRESSIVE FEATURES DUE TO GENERAL MEDICAL CONDITION: ICD-10-CM

## 2025-04-25 PROCEDURE — 99215 OFFICE O/P EST HI 40 MIN: CPT | Performed by: NURSE PRACTITIONER

## 2025-04-25 PROCEDURE — 3008F BODY MASS INDEX DOCD: CPT | Performed by: NURSE PRACTITIONER

## 2025-04-25 RX ORDER — DEXTROAMPHETAMINE SULFATE, DEXTROAMPHETAMINE SACCHARATE, AMPHETAMINE SULFATE AND AMPHETAMINE ASPARTATE 7.5; 7.5; 7.5; 7.5 MG/1; MG/1; MG/1; MG/1
30 CAPSULE, EXTENDED RELEASE ORAL EVERY MORNING
Qty: 30 CAPSULE | Refills: 0 | Status: SHIPPED | OUTPATIENT
Start: 2025-04-25 | End: 2025-05-25

## 2025-04-25 RX ORDER — DEXTROAMPHETAMINE SULFATE, DEXTROAMPHETAMINE SACCHARATE, AMPHETAMINE SULFATE AND AMPHETAMINE ASPARTATE 7.5; 7.5; 7.5; 7.5 MG/1; MG/1; MG/1; MG/1
30 CAPSULE, EXTENDED RELEASE ORAL EVERY MORNING
Qty: 30 CAPSULE | Refills: 0 | Status: SHIPPED | OUTPATIENT
Start: 2025-06-24 | End: 2025-07-24

## 2025-04-25 RX ORDER — SERTRALINE HYDROCHLORIDE 100 MG/1
200 TABLET, FILM COATED ORAL DAILY
Qty: 60 TABLET | Refills: 3 | Status: SHIPPED | OUTPATIENT
Start: 2025-04-25 | End: 2025-08-23

## 2025-04-25 RX ORDER — DEXTROAMPHETAMINE SULFATE, DEXTROAMPHETAMINE SACCHARATE, AMPHETAMINE SULFATE AND AMPHETAMINE ASPARTATE 7.5; 7.5; 7.5; 7.5 MG/1; MG/1; MG/1; MG/1
30 CAPSULE, EXTENDED RELEASE ORAL EVERY MORNING
Qty: 30 CAPSULE | Refills: 0 | Status: SHIPPED | OUTPATIENT
Start: 2025-05-25 | End: 2025-06-24

## 2025-04-25 RX ORDER — DEXTROAMPHETAMINE SULFATE, DEXTROAMPHETAMINE SACCHARATE, AMPHETAMINE SULFATE AND AMPHETAMINE ASPARTATE 1.25; 1.25; 1.25; 1.25 MG/1; MG/1; MG/1; MG/1
5 CAPSULE, EXTENDED RELEASE ORAL
Qty: 30 CAPSULE | Refills: 0 | Status: SHIPPED | OUTPATIENT
Start: 2025-05-25 | End: 2025-06-24

## 2025-04-25 RX ORDER — ARIPIPRAZOLE 400 MG
400 KIT INTRAMUSCULAR
Qty: 1 EACH | Refills: 3 | Status: SHIPPED | OUTPATIENT
Start: 2025-04-25

## 2025-04-25 RX ORDER — DEXTROAMPHETAMINE SULFATE, DEXTROAMPHETAMINE SACCHARATE, AMPHETAMINE SULFATE AND AMPHETAMINE ASPARTATE 1.25; 1.25; 1.25; 1.25 MG/1; MG/1; MG/1; MG/1
5 CAPSULE, EXTENDED RELEASE ORAL
Qty: 30 CAPSULE | Refills: 0 | Status: SHIPPED | OUTPATIENT
Start: 2025-04-25 | End: 2025-05-25

## 2025-04-25 RX ORDER — SERTRALINE HYDROCHLORIDE 50 MG/1
50 TABLET, FILM COATED ORAL DAILY
Qty: 30 TABLET | Refills: 3 | Status: SHIPPED | OUTPATIENT
Start: 2025-04-25 | End: 2025-08-23

## 2025-04-25 RX ORDER — DEXTROAMPHETAMINE SULFATE, DEXTROAMPHETAMINE SACCHARATE, AMPHETAMINE SULFATE AND AMPHETAMINE ASPARTATE 1.25; 1.25; 1.25; 1.25 MG/1; MG/1; MG/1; MG/1
5 CAPSULE, EXTENDED RELEASE ORAL
Qty: 30 CAPSULE | Refills: 0 | Status: SHIPPED | OUTPATIENT
Start: 2025-06-24 | End: 2025-07-24

## 2025-04-25 RX ORDER — PRAZOSIN HYDROCHLORIDE 2 MG/1
4 CAPSULE ORAL NIGHTLY
Qty: 60 CAPSULE | Refills: 3 | Status: SHIPPED | OUTPATIENT
Start: 2025-04-25
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
## 2025-04-25 NOTE — PATIENT INSTRUCTIONS
1. Continue Adderall XR 5 mg by mouth at noon as booster for ADHD. Not able to get name brand in IR. Therefore, XR ordered.  2. Continue Adderall XR 30 mg by mouth daily for ADHD- must be name brand. I have personally reviewed the OARRS report 4/25/25. I have considered the risks of abuse, dependence, addiction and diversion.  Stim agreement signed 7/3/2024  Urine amphetamine and drug screen 12/27/24 in office   3. Continue sertraline (Zoloft) 250 mg by mouth daily for PTSD and skin excoriation  4. Continue Prazosin 4 mg by mouth at bedtime for nightmares related to PTSD.   5. Continue Abilify Maintena 400 mg IM Q 4 weeks for moods   6. Risks, benefits, alternatives, off-label uses, and side effects of medications have been discussed with patient/caregiver. There is no report of signs/symptoms consistent with medication-induced impairment in daily functioning. At this time, benefits of medication felt to outweigh potential risks. Will continue to reassess need for psychotropic medication at regular 3 month intervals.  7. Return to clinic 3 months for an in-person appointment or earlier if needed. Call [phone] to reschedule.  8. Mom will obtain pharmacogenomics testing (PGT) results from previous MH & scan to [email or web address] or bring to office   9. Discussed  recommendation yesterday: Increase Physical Activity to 60 mins daily (15 mins/4 times daily) with resistance training. Worked on routine schedule: 8 AM, 12 PM, 4 PM, & 9 PM. Motivation: will go to [place]. Includes walking the dog or cat.    Thank you for seeing me today. If you have any questions, do not hesitate to contact my office.  Gwendolyn Anthony    TREATMENT TYPE         counseling and coordination of care; addressing signs and symptoms of illness; risks/benefits and side effects of medications; and behavioral approaches to illness.  This note was created using electronic dictation. There may be errors in syntax and  meaning. Please contact the office with any questions.

## 2025-04-30 ENCOUNTER — TREATMENT (OUTPATIENT)
Dept: PHYSICAL THERAPY | Facility: CLINIC | Age: 35
End: 2025-04-30
Payer: MEDICAID

## 2025-04-30 DIAGNOSIS — M25.50 GENERALIZED JOINT PAIN: Primary | ICD-10-CM

## 2025-04-30 DIAGNOSIS — R26.81 UNSTEADY GAIT WHEN WALKING: ICD-10-CM

## 2025-04-30 DIAGNOSIS — Q87.11 PRADER-WILLI SYNDROME (HHS-HCC): ICD-10-CM

## 2025-04-30 PROCEDURE — 97113 AQUATIC THERAPY/EXERCISES: CPT | Mod: GP,CQ | Performed by: SPECIALIST/TECHNOLOGIST

## 2025-04-30 ASSESSMENT — PAIN - FUNCTIONAL ASSESSMENT: PAIN_FUNCTIONAL_ASSESSMENT: 0-10

## 2025-04-30 ASSESSMENT — PAIN SCALES - GENERAL: PAINLEVEL_OUTOF10: 3

## 2025-04-30 NOTE — PROGRESS NOTES
Physical Therapy  Physical Therapy Treatment    Patient Name: Lonny Andrews  MRN: [id number]  Today's Date: 4/30/2025  Time Calculation  Start Time: 1113  Stop Time: 1200  Time Calculation (min): 47 min    Insurance:  Visit number: 4 of med nec  Authorization info: No Auth needed  Insurance Type: Medicaid              General  Reason for Referral: general joint pain  Referred By: PENNY Lawler MD    Current Problem  1. Generalized joint pain            Precautions  STEADI Fall Risk Score (The score of 4 or more indicates an increased risk of falling): 7  Precautions Comment: none    Pain Assessment: 0-10  0-10 (Numeric) Pain Score: 3    Subjective:   Patient arrived full weight bearing without a limp.  Patient noted pain 3 of 10 on arrival. Patient noted relief lasted a long time throughout the day of last session.    HEP Performed:  Yes, with encouragement from mom.    Objective:   Alternate feet up/down stairs of pool  Decreased heel strike/toe off     Treatments:   Pool Depth: 4 foot, Temperature 92°  Forward/retro walk 5'   Side Step 4'   MIP 5'  HR/TR 3'  Hip abd/add R/L 5' each   Hip flex/ext R/L 2' each   Floats: Shoulder extension R/L -  2' narrow base  Floats: horizontal ABD B- 2' ea tandem stance R/L     Standing HF stretch R/L 1'  Standing Hams stretch on stair R/L 1'    1/4 squats 3'    Access Code: [email or web address]     Charges: AQ x3    Assessment: Patient tolerated intensity without problems.  Patient noted feeling much better post treatment.      Plan:   Continue aquatic exercise as tolerated. Patient has two aquatic sessions prior to land re-check with Darleen Dow, PT, OCS    Win Loja PTA

## 2025-05-06 ENCOUNTER — TREATMENT (OUTPATIENT)
Dept: PHYSICAL THERAPY | Facility: CLINIC | Age: 35
End: 2025-05-06
Payer: MEDICAID

## 2025-05-06 DIAGNOSIS — Q87.11 PRADER-WILLI SYNDROME (HHS-HCC): ICD-10-CM

## 2025-05-06 DIAGNOSIS — M25.50 GENERALIZED JOINT PAIN: ICD-10-CM

## 2025-05-06 DIAGNOSIS — R26.81 UNSTEADY GAIT WHEN WALKING: ICD-10-CM

## 2025-05-06 PROCEDURE — 97113 AQUATIC THERAPY/EXERCISES: CPT | Mod: GP,CQ | Performed by: SPECIALIST/TECHNOLOGIST

## 2025-05-06 NOTE — PROGRESS NOTES
Physical Therapy  Physical Therapy Treatment    Patient Name: Lonny Andrews  MRN: [id number]  Today's Date: 5/6/2025       Insurance:  Visit number: 4 of med nec  Authorization info: No Auth needed  Insurance Type: Medicaid              General       Current Problem  1. Prader-Willi syndrome (HHS-HCC)  Follow Up In Physical Therapy      2. Unsteady gait when walking  Follow Up In Physical Therapy      3. Generalized joint pain  Follow Up In Physical Therapy                    Subjective:   Patient arrived full weight bearing without a limp.  Patient noted pain 8 of 10 on arrival. Patient noted a lot of soreness after last session.     HEP Performed:  Yes, with encouragement from mom.    Objective:   Alternate feet up/down stairs of pool  Decreased heel strike/toe off     Treatments:   Pool Depth: 4 foot, Temperature 92°  Forward/retro walk 5'   Side Step 5'   MIP 5'  HR/TR 3'  Hip abd/add R/L 5' each   Hip flex/ext R/L 2' each   Alt hams 4'   Floats: Shoulder extension R/L -  2' narrow base  Floats: horizontal ABD B- 2' ea tandem stance R/L     Standing HF stretch R/L 1'  Standing Hams stretch on stair R/L 1'    1/4 squats 3'    Access Code: [email or web address]     Charges: AQ x3    Assessment: Patient tolerated intensity without problems.  Patient noted pain 0 of 10 post treatment    Plan:   Continue aquatic exercise as tolerated. Patient has one aquatic session prior to land re-check with Darleen Dow, PT, OCS    Win Loja, PTA

## 2025-05-08 ENCOUNTER — TELEPHONE (OUTPATIENT)
Dept: GENETICS | Facility: CLINIC | Age: 35
End: 2025-05-08
Payer: MEDICAID

## 2025-05-08 NOTE — TELEPHONE ENCOUNTER
Received notification from pharmacy prescription for Vykat requires prior authorization.  Request submitted on 5/5/2025 on CM.  Return notification on [facility] NDC not covered.  Reached out to patient plan and spoke with YAMILEX Chamorro.  She indicated the request for prior authorization approved.  PA # [id number] Effective 4/30/2025 through 04/29/2025.  Corey Chamorro at [facility] got a paid claim on 5/7/2025.  Approval letter scanned to patient chart.   Mireya Gusman RN

## 2025-05-08 NOTE — TELEPHONE ENCOUNTER
[facility] called and left VM in regards to the prior auth for pt.  Asking that they get a call back as they have some questions.    Can be reached at [phone]      Shireen Beckett MA

## 2025-05-09 ENCOUNTER — TELEPHONE (OUTPATIENT)
Dept: GENETICS | Facility: CLINIC | Age: 35
End: 2025-05-09
Payer: MEDICAID

## 2025-05-09 DIAGNOSIS — Q87.11 PRADER-WILLI SYNDROME (HHS-HCC): Primary | ICD-10-CM

## 2025-05-12 ENCOUNTER — DOCUMENTATION WITH CHARGES (OUTPATIENT)
Dept: PHYSICAL THERAPY | Facility: CLINIC | Age: 35
End: 2025-05-12
Payer: MEDICAID

## 2025-05-12 NOTE — PROGRESS NOTES
Therapy Communication Note    Patient Name: Lonny Andrews  MRN: [id number]  Department:   Room: Room/bed info not found  Today's Date: 5/12/2025     Discipline: Physical Therapy          Missed Visit Reason:  phoned patient's mother. States she forgot appointment, as she was used to Tuesday appointments. Confirmed next session, which will be on land.    Missed Time: No Show

## 2025-05-13 DIAGNOSIS — F42.4 COMPULSIVE SKIN PICKING: Primary | ICD-10-CM

## 2025-05-14 ENCOUNTER — PHARMACY VISIT (OUTPATIENT)
Dept: PHARMACY | Facility: CLINIC | Age: 35
End: 2025-05-14
Payer: COMMERCIAL

## 2025-05-14 LAB
ANION GAP SERPL CALCULATED.4IONS-SCNC: 9 MMOL/L (CALC) (ref 7–17)
BUN SERPL-MCNC: 16 MG/DL (ref 7–25)
BUN/CREAT SERPL: NORMAL (CALC) (ref 6–22)
CALCIUM SERPL-MCNC: 9.4 MG/DL (ref 8.6–10.3)
CHLORIDE SERPL-SCNC: 101 MMOL/L (ref 98–110)
CO2 SERPL-SCNC: 26 MMOL/L (ref 20–32)
CREAT SERPL-MCNC: 0.69 MG/DL (ref 0.6–1.26)
EGFRCR SERPLBLD CKD-EPI 2021: 125 ML/MIN/1.73M2
EST. AVERAGE GLUCOSE BLD GHB EST-MCNC: 123 MG/DL
EST. AVERAGE GLUCOSE BLD GHB EST-SCNC: 6.8 MMOL/L
GLUCOSE SERPL-MCNC: 81 MG/DL (ref 65–99)
HBA1C MFR BLD: 5.9 %
POTASSIUM SERPL-SCNC: 4.7 MMOL/L (ref 3.5–5.3)
SODIUM SERPL-SCNC: 136 MMOL/L (ref 135–146)

## 2025-05-14 PROCEDURE — RXMED WILLOW AMBULATORY MEDICATION CHARGE

## 2025-05-14 RX ORDER — ACETYLCYSTEINE 600 MG
CAPSULE ORAL
Qty: 60 CAPSULE | Refills: 3 | Status: SHIPPED | OUTPATIENT
Start: 2025-05-14

## 2025-05-15 ENCOUNTER — TELEPHONE (OUTPATIENT)
Dept: GENETICS | Facility: CLINIC | Age: 35
End: 2025-05-15
Payer: MEDICAID

## 2025-05-16 PROCEDURE — RXMED WILLOW AMBULATORY MEDICATION CHARGE

## 2025-05-16 NOTE — TELEPHONE ENCOUNTER
New PA approval #[id number]. Should be valid until 4/29/26.    There seems to be an issue with Pharmacy -- they will discuss with Medicaid.     Ordered glucometer, lancets, strips per mother's request.  Requested that she call my office when they receive the mailed medications.     Will check on the PA approval in 2 weeks.

## 2025-05-19 ENCOUNTER — PHARMACY VISIT (OUTPATIENT)
Dept: PHARMACY | Facility: CLINIC | Age: 35
End: 2025-05-19
Payer: MEDICAID

## 2025-05-20 ENCOUNTER — TELEPHONE (OUTPATIENT)
Dept: GENETICS | Facility: CLINIC | Age: 35
End: 2025-05-20

## 2025-05-20 ENCOUNTER — PHARMACY VISIT (OUTPATIENT)
Dept: PHARMACY | Facility: CLINIC | Age: 35
End: 2025-05-20

## 2025-05-20 ENCOUNTER — APPOINTMENT (OUTPATIENT)
Dept: BEHAVIORAL HEALTH | Facility: CLINIC | Age: 35
End: 2025-05-20
Payer: MEDICAID

## 2025-05-20 VITALS
TEMPERATURE: 97.8 F | SYSTOLIC BLOOD PRESSURE: 112 MMHG | RESPIRATION RATE: 18 BRPM | HEART RATE: 67 BPM | BODY MASS INDEX: 33.52 KG/M2 | DIASTOLIC BLOOD PRESSURE: 62 MMHG | WEIGHT: 160.4 LBS

## 2025-05-20 DIAGNOSIS — F06.31 MOOD DISORDER WITH DEPRESSIVE FEATURES DUE TO GENERAL MEDICAL CONDITION: Primary | ICD-10-CM

## 2025-05-20 ASSESSMENT — COLUMBIA-SUICIDE SEVERITY RATING SCALE - C-SSRS
6. HAVE YOU EVER DONE ANYTHING, STARTED TO DO ANYTHING, OR PREPARED TO DO ANYTHING TO END YOUR LIFE?: NO
TOTAL  NUMBER OF ABORTED OR SELF INTERRUPTED ATTEMPTS LIFETIME: NO
ATTEMPT SINCE LAST CONTACT: NO
TOTAL  NUMBER OF INTERRUPTED ATTEMPTS LIFETIME: NO
2. HAVE YOU ACTUALLY HAD ANY THOUGHTS OF KILLING YOURSELF?: NO
SUICIDE, SINCE LAST CONTACT: NO
TOTAL  NUMBER OF ABORTED OR SELF INTERRUPTED ATTEMPTS SINCE LAST CONTACT: NO
ATTEMPT LIFETIME: NO
1. SINCE LAST CONTACT, HAVE YOU WISHED YOU WERE DEAD OR WISHED YOU COULD GO TO SLEEP AND NOT WAKE UP?: NO
6. HAVE YOU EVER DONE ANYTHING, STARTED TO DO ANYTHING, OR PREPARED TO DO ANYTHING TO END YOUR LIFE?: NO
2. HAVE YOU ACTUALLY HAD ANY THOUGHTS OF KILLING YOURSELF?: NO
TOTAL  NUMBER OF INTERRUPTED ATTEMPTS SINCE LAST CONTACT: NO
1. HAVE YOU WISHED YOU WERE DEAD OR WISHED YOU COULD GO TO SLEEP AND NOT WAKE UP?: NO

## 2025-05-20 ASSESSMENT — PAIN SCALES - GENERAL: PAINLEVEL_OUTOF10: 0-NO PAIN

## 2025-05-20 NOTE — PROGRESS NOTES
Patient here at the clinic today to receive his monthly Abilify Maintena 400mg for Mood disorder with depressive features      Patient identified by full name and  and vitals obtained. patient last seen by provider 25, last seen by nurse on 25  Medication verified by providers note and medication order       Appetite: no change  Sleep: No change  Appearance: clean, age appropriate, well-groomed  Build: average  Attitude: cooperative, calm, pleasant, friendly, open  Eye Contact: normal  Activity: alert, attentive, appropriate  Speech: appropriate & spontaneous, normal  Delusions: patient denies   Thought Content: logical  Thought Process: logical  Judgement/insight: Fair  Mood: calm happy  Affect: appropriate  AH/VH/SI/HI: patient denies  Access to firearms/weapons: No  Depression: patient denies  Thoughts of hopelessness: Patient denies  Anxiety: patient denies  Self-injurious behavior: patient denies  Cravings/Urges: NA  Last use: NA  Tobacco Use: non-smoker  Spiritual or cultural practices that may affect your care or impact your health care decisions: no  Living situation lives with mom whom is his guardian   Employed: No        Patient here at the clinic today to receive his monthly Abilify Maintena for Mood disorder accompanied by his mother. Patient was cooperative and engaged during this visit. Per mom there has been no issues with previous injection and denies any issues with SI/HI, VH/AH, depression and no recent hospitalizations      Patient received injection of Abilify Maintena 400mg/2ml via 23 gauge w/o incident into right deltoid   deltoid area. Patient tolerated injection well, no reaction at injection site.     RN provided education on medications sided effects, the importance of reporting any changes in the injection site, for minor arm pain utilization of ibuprofen and a warm compress should relive any discomfort.  RN has provided patient and mom direct office number for future  questions and the mobile crisis number for any emergent concerns that may arise.      LOT # [id number]  EXP:  MAR 2027  NDC: 20859-029-45        Patient will RTC in 4 weeks     VIANNEY SiddiquiN

## 2025-05-22 ENCOUNTER — TELEPHONE (OUTPATIENT)
Dept: PRIMARY CARE | Facility: CLINIC | Age: 35
End: 2025-05-22
Payer: MEDICAID

## 2025-05-22 DIAGNOSIS — G47.19 EXCESSIVE DAYTIME SLEEPINESS: Primary | ICD-10-CM

## 2025-05-22 NOTE — TELEPHONE ENCOUNTER
Patients mom called back today requesting that you order another Sleep Study but it has to state that he needs to have a caregiver with him due to his mental health situation; once that is put on the req. She will be able to schedule him

## 2025-05-22 NOTE — TELEPHONE ENCOUNTER
Called and discussed. Vykat was approved for the 75mg but not 150mg. She received 75mg tablets delivered to home. We should wait until the 150mg is approved. To keep her updated on this.

## 2025-05-23 ENCOUNTER — APPOINTMENT (OUTPATIENT)
Dept: PHYSICAL MEDICINE AND REHAB | Facility: CLINIC | Age: 35
End: 2025-05-23
Payer: MEDICAID

## 2025-05-23 VITALS — HEART RATE: 85 BPM | RESPIRATION RATE: 20 BRPM | DIASTOLIC BLOOD PRESSURE: 71 MMHG | SYSTOLIC BLOOD PRESSURE: 121 MMHG

## 2025-05-23 DIAGNOSIS — Q87.11 PRADER-WILLI SYNDROME (HHS-HCC): ICD-10-CM

## 2025-05-23 DIAGNOSIS — M25.50 GENERALIZED JOINT PAIN: Primary | ICD-10-CM

## 2025-05-23 DIAGNOSIS — R26.81 UNSTEADY GAIT WHEN WALKING: ICD-10-CM

## 2025-05-23 DIAGNOSIS — R79.89 LOW VITAMIN D LEVEL: ICD-10-CM

## 2025-05-23 PROCEDURE — 99214 OFFICE O/P EST MOD 30 MIN: CPT | Performed by: PHYSICAL MEDICINE & REHABILITATION

## 2025-05-23 RX ORDER — MELOXICAM 7.5 MG/1
TABLET ORAL
Qty: 45 TABLET | Refills: 2 | Status: SHIPPED | OUTPATIENT
Start: 2025-05-23

## 2025-05-23 ASSESSMENT — PAIN SCALES - GENERAL: PAINLEVEL_OUTOF10: 8

## 2025-05-23 NOTE — PROGRESS NOTES
"Physical Medicine and Rehabilitation [facility] Consult  05/23/25       Chief Complaint:  Low back pain     HPI:  Lonny Anderws is a  35 y.o. F who presents to the clinic today  for evaluation of arthralgias.    Referred by genetics \"Has Prader-Willi Syndrome and hypotonia, unsteady gait, poor posture - assess to see if could benefit from certain exercises, braces, etc \"    Onset: generalized joint pain that has been for years'; but more so in the last year.  States elbow pain is new in the last week, left.   He also has hypotonia due to Prader Barry syndrome.  He also has unsteady gait for at least a year.   Sometimes bumps into the aponte, staggers.     Feels dizzy and light headedness. Uses the wall for balances. Happens when gets up too fast.   This occurs once or twice a day.     Hold on to railing when doing steps.     He has been ding exercises video daily for about ten years ago and after 2018 was hard to do the video due to pain.  Mom had  a TBI and this took them off track w the exercise program. States that he sometimes is able to walk 5 miles and sometimes not.  Some days feel good and some not so good. States he has more bad days.   Sleep is not always great.     States joint pains come and go. Elbow pain is new,. Complains more lower extremity and upper pain.  States pain is sharp.  Has an appt for sleep mediine.      He was last seen in march 2025. At that time we discussed:  Diffuse joint pain/arthralgia  - xr left elbow. Bk ankles, l spine  - angelo, esr, crp. Ra, ccp, vitamin d  - Pt water therapy for strengthening conditioning and gait   - voltaren gel prn for joint pain  - can trial either ankle support braces low profile vs high top shoes for more ankle stability    Angelo borderline other blood work negative, pending rheum appt. Water is better but not sustained. He is not doing to much strenuous activity.    Xr ankles and left elbow wnl. Lower back has facet arthropathy.      Imaging  Reviewed " 5/2025  XR R elbow 3/2025  FINDINGS:  Three views of left elbow were performed.      There is no acute fracture or dislocation. Radiocapitellar and  ulnotrochlear joint articulations are maintained. No significant  elbow joint effusion is noted.      IMPRESSION:  No acute osseous findings.      Xr l spine 3/2025  FINDINGS:  Lumbar vertebral body heights are maintained. No acute compression  fracture deformities are noted. The alignment of lumbar vertebral  bodies is intact. There is suggestion of mild discogenic degenerative  changes at L5-S1 level with mild facet arthropathy and mild loss of  disc space height. Prevertebral and paraspinal soft tissues appear  grossly unremarkable.      IMPRESSION:  Mild discogenic degenerative changes at L5-S1 level as described  above.  Bl ankles 3/2025  FINDINGS:  Three views of left ankle were performed.      Right ankle: There is no acute fracture or dislocation. Ankle mortise  is intact. No significant degenerative changes are noted in the  tibiotalar joint. No obvious soft tissue abnormality.      Left ankle: There is no acute fracture or dislocation. Ankle mortise  is intact. No significant degenerative changes are noted in the  tibiotalar joint. No obvious soft tissue abnormality.      IMPRESSION:  No acute osseous findings in bilateral ankles.  Mri brain 7/2022  FINDINGS:  There is no evidence of a sellar or suprasellar mass.  The pituitary  gland enhances homogenously. Pituitary gland is slightly smaller in  size/shows mild hypoplasia. Trace deviation of the infundibulum to  the left. The optic chiasm is unremarkable.  Bilateral cavernous  sinuses demonstrate symmetric enhancement. Visualized internal  carotid arteries demonstrate expected flow voids. Visualized portions  of the intracranial structures on T2 weighted axial images are  unremarkable.     IMPRESSION:  No evidence of a sellar or suprasellar mass.  Past Medical History:   Diagnosis Date    Decreased white  blood cell count, unspecified 09/20/2021    Leukopenia    Encounter for immunization 07/19/2016    Diphtheria-tetanus-pertussis (DTP) vaccination    Personal history of other infectious and parasitic diseases 09/29/2020    History of herpes simplex infection        No past surgical history on file.     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/2023    Allergic rhinitis 08/26/2023    Anemia 08/26/2023    Ataxia 08/26/2023    Attention-deficit/hyperactivity disorder 08/26/2023    Bilateral hip pain 08/26/2023    Bilateral knee pain 08/26/2023    Pain in both lower extremities 08/26/2023    Canker sore 08/26/2023    Central obesity 08/26/2023    Cervical lymphadenopathy 08/26/2023    Daytime somnolence 08/26/2023    Dental disorder 08/26/2023    Dog bite of face 08/26/2023    Dorsal cervical fat pad 08/26/2023    Foot sprain, left, initial encounter 08/26/2023    Elevated creatine kinase level 08/26/2023    Gastric dysmotility 08/26/2023    Gastroparesis 08/26/2023    Hypercholesterolemia 08/26/2023    Hyperglycemia 08/26/2023    Hypogonadism in male 08/26/2023    Impairment of balance 08/26/2023    Injury of ankle and foot, left, initial encounter 08/26/2023    Intractable chronic migraine without aura and without status migrainosus 08/26/2023    Kyphosis, acquired 08/26/2023    Low testosterone in male 08/26/2023    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 08/26/2023    Muscle cramps 08/26/2023    Nasal dryness 08/26/2023    Nightmare disorder 08/26/2023    Obsessive compulsive disorder 08/26/2023    PTSD (post-traumatic stress disorder) 08/26/2023    Nocturnal leg movements 08/26/2023    Mental disorder  08/26/2023    Obesity 08/26/2023    Oppositional defiant disorder 08/26/2023    Poor sleep pattern 08/26/2023    Prader-Willi syndrome ([facility]-H[facility]) 08/26/2023    Prediabetes 08/26/2023    Psychological factor affecting physical condition 08/26/2023    Recurrent HSV (herpes simplex virus) 08/26/2023    Sleep-disordered breathing 08/26/2023    Superficial bacterial infection of skin 08/26/2023    Tinea corporis 08/26/2023    Tinea cruris 08/26/2023    Unsteady gait when walking 08/26/2023    Urinary incontinence 08/26/2023    Vitamin D deficiency 08/26/2023    Weight gain 08/26/2023    Witnessed episode of apnea 08/26/2023        Family History   Problem Relation Name Age of Onset    Asthma Mother      Hyperlipidemia Mother      Hypertension Mother      Migraines Mother      Asthma Father      Hypertension Father      Diabetes Father      Asthma Sister      Anxiety disorder Sister      Depression Sister      Migraines Sister      Diabetes Other          Current Outpatient Medications   Medication Sig Dispense Refill    acetylcysteine 600 mg capsule Take 1 capsule (600 mg) by mouth daily. (To increase the dose every few weeks /clinical response, Mother to contact Dr Forrester for adjustments (MAX dose is 2,400mg/day) 60 capsule 3    Adderall XR 30 mg 24 hr capsule Take 1 capsule (30 mg) by mouth once daily in the morning. Do not crush or chew. Do not fill before May 17, 2025. 30 capsule 0    Adderall XR 5 mg 24 hr capsule Take 5 mg by mouth at noon.  Do not crush or chew. Do not fill before May 17, 2025. 30 capsule 0    ARIPiprazole ER (Abilify Maintena) 400 mg injection Inject 400 mg into the muscle every 28 (twenty-eight) days. 1 each 3    blood sugar diagnostic (True Metrix Glucose Test Strip) Use 1 test strip to test blood glucose daily 100 each 3    blood-glucose meter (True Metrix Glucose Meter) misc use as directed 1 each 1    ergocalciferol (Vitamin D-2) 1250 mcg (50,000 units) capsule Take 1 capsule (50,000  Units) by mouth 1 (one) time per week. 12 capsule 1    ibuprofen (IBU) 600 mg tablet Take by mouth every 6 hours if needed.      ketoconazole (NIZOral) 2 % shampoo APPLY 1 APPLICATION EXTERNALLY TO SCALP 2-3 TIMES A WEEK FOR 2 WEEKS THEN ONCE WEEKLY FOR 2 WEEKS THEN AS NEEDED 120 mL 3    lancets (TRUEplus Lancets) 33 gauge misc Use 1 per day as directed. 100 each 3    multivitamin with minerals (MULTIPLE VITAMIN-MINERALS ORAL) Take 1 tablet by mouth once daily.      netarsudiL-latanoprost (Rocklatan) 0.02-0.005 % drops Administer 1 drop into affected eye(s) once daily at bedtime. 2.5 mL 3    prazosin (Minipress) 2 mg capsule Take 2 capsules (4 mg) by mouth once daily at bedtime. 60 capsule 3    sertraline (Zoloft) 100 mg tablet Take 2 tablets (200 mg) by mouth once daily. 60 tablet 3    sertraline (Zoloft) 50 mg tablet Take 1 tablet (50 mg) by mouth once daily. 30 tablet 3    testosterone (Androgel) 1 % (25 mg/2.5gram) gel in packet Place 1 packet (25 mg) on the skin once daily. 90 packet 1    valACYclovir (Valtrex) 500 mg tablet Take 1 tablet (500 mg) by mouth once daily. 90 tablet 1    Adderall XR 30 mg 24 hr capsule Take 1 capsule (30 mg) by mouth once daily in the morning. Do not crush or chew. Do not fill before March 25, 2025. 30 capsule 0    Adderall XR 30 mg 24 hr capsule Take 1 capsule (30 mg) by mouth once daily in the morning. Do not crush or chew. 30 capsule 0    [START ON 6/16/2025] Adderall XR 30 mg 24 hr capsule Take 1 capsule (30 mg) by mouth once daily in the morning. Do not crush or chew. Do not fill before June 16, 2025. 30 capsule 0    Adderall XR 30 mg 24 hr capsule Take 1 capsule (30 mg) by mouth once daily in the morning. Do not crush or chew. 30 capsule 0    [START ON 5/25/2025] Adderall XR 30 mg 24 hr capsule Take 1 capsule (30 mg) by mouth once daily in the morning. Do not crush or chew. Do not fill before May 25, 2025. 30 capsule 0    [START ON 6/24/2025] Adderall XR 30 mg 24 hr capsule  "Take 1 capsule (30 mg) by mouth once daily in the morning. Do not crush or chew. Do not fill before June 24, 2025. 30 capsule 0    Adderall XR 5 mg 24 hr capsule Take 1 capsule (5 mg) by mouth once daily in the morning. Do not crush or chew. Do not fill before March 25, 2025. 30 capsule 0    Adderall XR 5 mg 24 hr capsule Take 5 mg by mouth at noon.  Do not crush or chew. (Patient not taking: No sig reported) 30 capsule 0    [START ON 6/16/2025] Adderall XR 5 mg 24 hr capsule Take 5 mg by mouth at noon.  Do not crush or chew. Do not fill before June 16, 2025. 30 capsule 0    Adderall XR 5 mg 24 hr capsule Take 1 capsule (5 mg) by mouth once daily at noon. Take with meals. Do not crush or chew. 30 capsule 0    [START ON 5/25/2025] Adderall XR 5 mg 24 hr capsule Take 1 capsule (5 mg) by mouth once daily at noon. Take with meals. Do not crush or chew. Do not fill before May 25, 2025. 30 capsule 0    [START ON 6/24/2025] Adderall XR 5 mg 24 hr capsule Take 1 capsule (5 mg) by mouth once daily at noon. Take with meals. Do not crush or chew. Do not fill before June 24, 2025. 30 capsule 0     No current facility-administered medications for this visit.        Allergies   Allergen Reactions    Clonazepam Unknown    Dextroamphetamine-Amphetamine Unknown     generic Adderall didn't seem like it worked      Oxcarbazepine Psychosis     Mother reports:he had a \"psychotic breaK' (self mutilation behavior, biting of his fingers - self-injurious and aggressive behaviors - impulsivity - wanting to kill himself) on the generic Trileptal -     Ziprasidone Hcl Other and Hallucinations        Social History     Socioeconomic History    Marital status: Single   Tobacco Use    Smoking status: Never     Passive exposure: Never    Smokeless tobacco: Never   Substance and Sexual Activity    Alcohol use: Never    Drug use: Never   He was in a work program but difficulty w bahaviour issues,.  Has services [facility]  Lives w mom and sisters " and nephews.      Review of Systems:  Constitutional:  Denies fever or chills, malaise, weight changes.   Eyes:  Denies change in visual acuity   HENT:  Denies nasal congestion or sore throat   Respiratory:  Denies cough or shortness of breath   Cardiovascular:  Denies chest pain or edema   GI:  as per hpi  :  Denies dysuria   Integument:  Denies rash   Neurologic:  As per HPI  MSK: Per above HPI  Endocrine:  Denies polyuria or polydipsia   Lymphatic:  Denies swollen glands   Psychiatric:  as per hpi           PHYSICAL EXAM:  /71 (BP Location: Left arm, Patient Position: Sitting)   Pulse 85   Resp 20     General:  NAD, well developed    Psychiatric: appropriate mood & affect.   Cardiovascular:  Normal pedal pulses; no LE edema.  Respiratory:  Normal rate; unlabored breathing.  Skin:  Intact; no erythema; no ecchymosis or rash.  Lymphatic:  No lymphadenopathy or lymphedema.  NEURO:  Alert and appropriate. Speech fluent, conversing appropriately.   Motor:    Rt: HF 5/5, KE 5/5, KF 5/5, DF 5/5, EHL 5/5, PF 5/5.    Lt: HF 5/5, KE 5/5, KF 5/5, DF 5/5, EHL 5/5, PF 5/5.  Sensation:     Light touch: intact in the b/l L3-S1 dermatomes.    Reflexes:   Brisk ue and le reflexes  No joint redness or warmth to palpation    Babinski's downgoing b/l; no clonus  Gait: bl knee valgus deformity, bl pes planus, wide based gait    Full rom elbow and knees,    R leg intermittently appears to give out  Some mild recurvatum       Impression: Lonny Andrews is a 35 y.o. M w pmh of Has Prader-Willi Syndrome, adhd, OCD, cognitive impairment presenting with diffuse joint pains that seem to come and go that affect his ability to exercise. Appears sometimes some of this is behavioral, however his mom state it is happening more frequently and due to diffuseness of symptoms need to rule out an autoimmune process. Blood work mostly negative, pending rheum appt. Does well w therapy however pain persists after.      No diagnosis  found.      Plan:     Diffuse joint pain/arthralgia  - xr left elbow. Bk ankles, l spine; mild spondylosis in L spine; normal elbow and ankles; on personal review  - angelo, esr, crp. Ra, ccp, vitamin d (wnl); angelo borderline; other tests negative; pending rheum though less likely inflammatory arthritis  - Pt water therapy for strengthening conditioning and gait; continue can consider ace wrap for ankle stability; ? smo   - voltaren gel prn for joint pain  - can trial either ankle support braces low profile vs high top shoes for more ankle stability however currently difficulty w finances since mom is losing her job       Santa Lawler MD  Physical Medicine and Rehabilitation

## 2025-05-23 NOTE — PATIENT INSTRUCTIONS
Ask psychiatrist regarding cymbalta because can help generalized pain  Mobic is anti inflammatory once or two caps once a day as needed after taking it regularly for ten days w food  Tylenol upto 1000 mg three times a day

## 2025-05-27 ENCOUNTER — TELEPHONE (OUTPATIENT)
Dept: GENETICS | Facility: CLINIC | Age: 35
End: 2025-05-27
Payer: MEDICAID

## 2025-05-28 NOTE — PROGRESS NOTES
Physical Therapy  Physical Therapy Treatment    Patient Name: Lonny Andrews  MRN: [id number]  Today's Date: 5/29/2025  Time Calculation  Start Time: 0915  Stop Time: 1000  Time Calculation (min): 45 min    Referring Physician: Dr. Santa Lawler  Visit number: 5 of [facility]  Authorization info: No Auth needed  Insurance Type: Medicaid      Current Problem  1. Prader-Willi syndrome (HHS-HCC)  Follow Up In Physical Therapy      2. Unsteady gait when walking  Follow Up In Physical Therapy      3. Generalized joint pain  Follow Up In Physical Therapy             Precautions  Precautions  STEADI Fall Risk Score (The score of 4 or more indicates an increased risk of falling): 7    0-10 (Numeric) Pain Score: 8  Performing HEP: No      Subjective   Patient reports increased pain B legs from hip to foot. Patient states he is not complaint with HEP. Patient has a membership to [employer], but has not been recently.      Objective   Posture: FH, RS, slouched sitting  Gait: proper heel strike/toe off    Treatment:        Nustep x5 min  Review and perform HEP (land)  Airex march x2'  DBE 2x2'  Bosu step up alt R/L x2  Tandem balance with 4kg KB pass cw/ccw x10 R/L   Leg press 70# x20  Rebounder on airex tandem stance 4# x10 R/L   Ham curl 30# 2x10    Charges: 2 ex, 1 NME    Assessment:   Patient demonstrated good form and endurance with exercise. Pain decreased to 1/10 at end of session.       Plan:   Progress with land based exercise as tolerated. Encourage patient to perform HEP and go to gym for land or aquatic based exercise. Patient given another handout of HEP.             Darleen Dow, PT

## 2025-05-29 ENCOUNTER — TREATMENT (OUTPATIENT)
Dept: PHYSICAL THERAPY | Facility: CLINIC | Age: 35
End: 2025-05-29
Payer: MEDICAID

## 2025-05-29 DIAGNOSIS — R26.81 UNSTEADY GAIT WHEN WALKING: ICD-10-CM

## 2025-05-29 DIAGNOSIS — Q87.11 PRADER-WILLI SYNDROME (HHS-HCC): ICD-10-CM

## 2025-05-29 DIAGNOSIS — M25.50 GENERALIZED JOINT PAIN: ICD-10-CM

## 2025-05-29 PROCEDURE — 97110 THERAPEUTIC EXERCISES: CPT | Mod: GP

## 2025-05-29 PROCEDURE — 97112 NEUROMUSCULAR REEDUCATION: CPT | Mod: GP

## 2025-05-29 ASSESSMENT — PAIN SCALES - GENERAL: PAINLEVEL_OUTOF10: 8

## 2025-05-31 NOTE — PROGRESS NOTES
Physical Therapy  Physical Therapy Treatment    Patient Name: Lonny Andrews  MRN: [id number]  Today's Date: 6/3/2025  Time Calculation  Start Time: 1130  Stop Time: 1215  Time Calculation (min): 45 min    Referring Physician: Dr. Santa Lawler  Visit number: 6 of med nec  Authorization info: No Auth needed  Insurance Type: Medicaid      Current Problem  1. Prader-Willi syndrome (HHS-HCC)  Follow Up In Physical Therapy      2. Unsteady gait when walking  Follow Up In Physical Therapy      3. Generalized joint pain  Follow Up In Physical Therapy               Precautions  Precautions  STEADI Fall Risk Score (The score of 4 or more indicates an increased risk of falling): 7    0-10 (Numeric) Pain Score: 0 - No pain  Performing HEP: yes      Subjective   Patient reports no pain today. States he is feeling good and has been doing exercises at home. Patient has been walking his dog outside several times/day.      Objective   Posture: FH, RS, slouched sitting  Good endurance with exercise    Treatment:        Nustep x5 min  Hamstring stretch R/L on step  Airex march x2'  DBE 2x2'  Bosu step up alt R/L x2  Tandem balance with 4kg KB pass cw/ccw x12 R/L   Leg press 70# 2x12  Ham curl 30# 2x12  Hip ABD/ADD 25# 2x10 ea  Rebounder SLS 4# x10 R/L leg/hand  Biodex LOS L12 70%, 40%- less hands support 2nd time    Charges: 2 ex, 1 NME    Assessment:   Patient appears to enjoy exercise in the gym and has no complaint of pain.       Plan:   Patient to continue with daily HEP and walking his dog. Progress gym exercises as tolerated for pain control and strength.         Darleen Dow, PT

## 2025-06-03 ENCOUNTER — TREATMENT (OUTPATIENT)
Dept: PHYSICAL THERAPY | Facility: CLINIC | Age: 35
End: 2025-06-03
Payer: MEDICAID

## 2025-06-03 ENCOUNTER — TELEPHONE (OUTPATIENT)
Dept: OTHER | Age: 35
End: 2025-06-03

## 2025-06-03 DIAGNOSIS — M25.50 GENERALIZED JOINT PAIN: ICD-10-CM

## 2025-06-03 DIAGNOSIS — Q87.11 PRADER-WILLI SYNDROME (HHS-HCC): ICD-10-CM

## 2025-06-03 DIAGNOSIS — R26.81 UNSTEADY GAIT WHEN WALKING: ICD-10-CM

## 2025-06-03 PROCEDURE — 97110 THERAPEUTIC EXERCISES: CPT | Mod: GP

## 2025-06-03 PROCEDURE — 97112 NEUROMUSCULAR REEDUCATION: CPT | Mod: GP

## 2025-06-03 ASSESSMENT — PAIN SCALES - GENERAL: PAINLEVEL_OUTOF10: 0 - NO PAIN

## 2025-06-04 DIAGNOSIS — F90.2 ATTENTION DEFICIT HYPERACTIVITY DISORDER (ADHD), COMBINED TYPE: ICD-10-CM

## 2025-06-04 RX ORDER — DEXTROAMPHETAMINE SULFATE, DEXTROAMPHETAMINE SACCHARATE, AMPHETAMINE SULFATE AND AMPHETAMINE ASPARTATE 7.5; 7.5; 7.5; 7.5 MG/1; MG/1; MG/1; MG/1
30 CAPSULE, EXTENDED RELEASE ORAL EVERY MORNING
Qty: 30 CAPSULE | Refills: 0 | Status: SHIPPED | OUTPATIENT
Start: 2025-06-04 | End: 2025-07-04

## 2025-06-04 RX ORDER — DEXTROAMPHETAMINE SULFATE, DEXTROAMPHETAMINE SACCHARATE, AMPHETAMINE SULFATE AND AMPHETAMINE ASPARTATE 1.25; 1.25; 1.25; 1.25 MG/1; MG/1; MG/1; MG/1
CAPSULE, EXTENDED RELEASE ORAL
Qty: 30 CAPSULE | Refills: 0 | Status: SHIPPED | OUTPATIENT
Start: 2025-06-04

## 2025-06-05 NOTE — PROGRESS NOTES
Physical Therapy  Physical Therapy Treatment    Patient Name: Lonny Andrews  MRN: [id number]  Today's Date: 6/10/2025  Time Calculation  Start Time: 1130  Stop Time: 1215  Time Calculation (min): 45 min    Referring Physician: Dr. Santa Lawler  Visit number: 7 of med nec  Authorization info: No Auth needed  Insurance Type: Medicaid      Current Problem  1. Prader-Willi syndrome (HHS-HCC)  Follow Up In Physical Therapy      2. Unsteady gait when walking  Follow Up In Physical Therapy      3. Generalized joint pain  Follow Up In Physical Therapy                 Precautions  Precautions  STEADI Fall Risk Score (The score of 4 or more indicates an increased risk of falling): 7    0-10 (Numeric) Pain Score: 0 - No pain  Performing HEP: yes      Subjective   Patient reports no pain today. States he did an exercise video several times since last visit with no pain. Also walking the dog outside.      Objective   Gait: normal    Treatment:        Nustep x5 min  Hamstring stretch R/L on step  Gastroc stretch on wedge B  Outdoor agilities:   -basketball dribble while walking full path to gate and back down  -side shuffle with basketball chest pass x100'  - side shuffle with basketball bounce pass x100'  -skip x100'  - long jump 2x10  - single leg hop 2x10 R/L  - 8' fwd step up x15 R/L  - HR off 8' step 2x10  - basketball pass off wall x20  - heel to toe walk on curb around front of parking lot  Standing quad stretch R/L  Seated piriformis stretch R/L    Deferred:  Airex march x2'  DBE 2x2'  Bosu step up alt R/L x2  Tandem balance with 4kg KB pass cw/ccw x12 R/L   Leg press 70# 2x12  Ham curl 30# 2x12  Hip ABD/ADD 25# 2x10 ea  Rebounder SLS 4# x10 R/L leg/hand  Biodex LOS L12 70%, 40%- less hands support 2nd time    Charges: 2 ex, 1 NME    Assessment:   Patient really enjoyed outdoor activities. No rest required, good endurance, strength and balance.       Plan:   Patient to continue with daily independent  exercise at home. Progress strength and endurance in PT.      Darleen Dow, PT

## 2025-06-06 ENCOUNTER — APPOINTMENT (OUTPATIENT)
Dept: URGENT CARE | Age: 35
End: 2025-06-06
Payer: MEDICAID

## 2025-06-06 PROCEDURE — RXMED WILLOW AMBULATORY MEDICATION CHARGE

## 2025-06-09 ENCOUNTER — PHARMACY VISIT (OUTPATIENT)
Dept: PHARMACY | Facility: CLINIC | Age: 35
End: 2025-06-09
Payer: COMMERCIAL

## 2025-06-10 ENCOUNTER — TREATMENT (OUTPATIENT)
Dept: PHYSICAL THERAPY | Facility: CLINIC | Age: 35
End: 2025-06-10
Payer: MEDICAID

## 2025-06-10 DIAGNOSIS — Q87.11 PRADER-WILLI SYNDROME (HHS-HCC): ICD-10-CM

## 2025-06-10 DIAGNOSIS — R26.81 UNSTEADY GAIT WHEN WALKING: ICD-10-CM

## 2025-06-10 DIAGNOSIS — M25.50 GENERALIZED JOINT PAIN: ICD-10-CM

## 2025-06-10 PROCEDURE — 97112 NEUROMUSCULAR REEDUCATION: CPT | Mod: GP

## 2025-06-10 PROCEDURE — 97110 THERAPEUTIC EXERCISES: CPT | Mod: GP

## 2025-06-10 ASSESSMENT — PAIN SCALES - GENERAL: PAINLEVEL_OUTOF10: 0 - NO PAIN

## 2025-06-11 ENCOUNTER — APPOINTMENT (OUTPATIENT)
Dept: ENDOCRINOLOGY | Facility: CLINIC | Age: 35
End: 2025-06-11
Payer: MEDICAID

## 2025-06-11 VITALS
HEIGHT: 60 IN | SYSTOLIC BLOOD PRESSURE: 98 MMHG | HEART RATE: 57 BPM | WEIGHT: 156 LBS | DIASTOLIC BLOOD PRESSURE: 58 MMHG | BODY MASS INDEX: 30.63 KG/M2

## 2025-06-11 DIAGNOSIS — Q87.11 PRADER-WILLI SYNDROME (HHS-HCC): ICD-10-CM

## 2025-06-11 DIAGNOSIS — E29.1 HYPOGONADISM IN MALE: Primary | ICD-10-CM

## 2025-06-11 PROCEDURE — 3008F BODY MASS INDEX DOCD: CPT | Performed by: INTERNAL MEDICINE

## 2025-06-11 PROCEDURE — 99214 OFFICE O/P EST MOD 30 MIN: CPT | Performed by: INTERNAL MEDICINE

## 2025-06-11 ASSESSMENT — PATIENT HEALTH QUESTIONNAIRE - PHQ9: 2. FEELING DOWN, DEPRESSED OR HOPELESS: SEVERAL DAYS

## 2025-06-11 NOTE — PROGRESS NOTES
Subjective   Patient ID: Lonny Andrews is a 35 y.o. 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     Background Hx:  Patient was born prematurely ~36 weeks, with significant for, lower weight and progressive hypotonic along with difficulty feeding and decrease appetite. His initial care was done in [facility] but shortly after transferred to [facility]. 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 Childhood and adulthood has been marked by neurocognitive delayed, behavioral issues such as PTDS, OCD, anxiety, violent mood, hyperphagia and obesity, and crypocorchisism. He used to follow in the pediatric department of [facility] and from [id number] was treated with GH analogs. No other hormone replacement therapy.      She reports that hyperphagia is still a problem needing to lock down the food and hide it, but given strict diet and exercise activity he has never been above 200 lbs. He does have craving for sweets but no salt. In the past, he has been enrolled PWS dedicated program from [facility].      During the past year patient's mother have noticed him more fatigued. Requiring more frequent naps during the day. Sleep study was done but resulted inconclusive since patient was not able to follow instructions. Also was referred to urology to evaluate hypogonadism as cause of fatigue. Biochemical labs showed Testosterone totoal : 95, testosterone free: 12.2, prolactin <1.0, estradiol: 3.5, FSH: 7.1, LH: 1.7, TSH: 0.74 . In addition, they have recently consulted neurology due to new onset of worsening headaches for last 2 months. CT head 05/2022 was unremarkable.      Pituitary labs were repeated and were consistent with above, low testotrone , low FSH/LH and  "undectable prolactin (confirmed with diluted sample. MRI sella 7/2022 evidenced a really small pituitary gland with a thin pituitary stalk. Optic chiasma preserved. 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         Last visit: 03/2025, he was prescribed testosterone gel, however, insurance did not approve.     Interval Hx:  The patient states he is doing fine, he never took  HCG and clomiphene , GLP-1 agonist, or testosterone gel, as all were denied by the insurance as a prior authorization was needed per the patient's mom.      He is doing ok. Noted with total of 6 lbs weight loss since last visit. He is following a strict diet and doing exercises. Reports generalized fatigue.   Energy wise, it is variable and the patient sleeps for the most part. He does not get any erections. He gets frustrated easily and that comes across verbally, however no aggression since 2019.  Denies any headache or blurry vision      Compliant with vitD3 50.000 units weekly.     He had DEXA scan on 2/5/25-> normal bone density.     Review of Systems  Negative except what mentioned above    Objective   Vitals:    06/11/25 0920   BP: 98/58   Pulse: 57   Weight: 70.8 kg (156 lb)   Height: 1.473 m (4' 10\")      Physical Exam  Physical Exam  General: short stature, cognitively delayed young male  HEENT: AT/NC  Neck: trachea in midline, mildly enlarged thyroid  Resp: CTA B/L  CVS: normal s1 and s2  Abdomen: soft and non tender to palpation, BS+  Skin: warm, dry and intact  Neuro: DTR 2 delayed relaxation, CN 2-12 grossly intact  Psych: cooperative but slow       Lab Review:    Latest Reference Range & Units 05/13/25 11:22   GLUCOSE 65 - 99 mg/dL 81   SODIUM 135 - 146 mmol/L 136   POTASSIUM 3.5 - 5.3 mmol/L 4.7   CHLORIDE 98 - 110 mmol/L 101   CARBON DIOXIDE 20 - 32 mmol/L 26   ELECTROLYTE BALANCE 7 - 17 mmol/L (calc) 9   UREA NITROGEN (BUN) 7 - 25 " mg/dL 16   CREATININE 0.60 - 1.26 mg/dL 0.69   EGFR > OR = 60 mL/min/1.73m2 125   BUN/CREATININE RATIO 6 - 22 (calc) SEE NOTE:   CALCIUM 8.6 - 10.3 mg/dL 9.4      Latest Reference Range & Units 03/07/25 12:15 05/13/25 11:22   FSH 1.4 - 12.8 mIU/mL 8.0    HEMOGLOBIN A1c <5.7 %  5.9 (H)               PARATHYROID HORMONE, INTACT 16 - 77 pg/mL 31    T4, FREE 0.8 - 1.8 ng/dL 1.0    LH 1.5 - 9.3 mIU/mL 0.6 (L)    PROLACTIN 2.0 - 18.0 ng/mL 1.0 (L)    TSH 0.40 - 4.50 mIU/L 0.72    DHEA SULFATE 93 - 415 mcg/dL 321       Latest Reference Range & Units 03/07/25 12:15   SEX HORMONE BINDING GLOBULIN 10 - 50 nmol/L 33   TESTOSTERONE, TOTAL,  - 1,100 ng/dL 32 (L)   TESTOSTERONE, FREE 35.0 - 155.0 pg/mL 4.0 (L)       Assessment/Plan   The patient is a 35 year old male with a PMHx of Prader Willi syndrome (PWS), (46, XY, 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  Discussed with the patient and his mother regarding the denial of HCG and Clomiphene, and Ozempic.   He was prescribed testosterone gel in March/2025, however, insurance did not approve.     #Hypogonadism:  [ ] will start patient on testosterone 50 mg IM injection q1 week. We contacted his medicaid insurance and were informed that testosterone injections is covered by his insurance          #Vit D deficiency:  -Most recent vitD level improved at 61 on 3/6/25  C/w cholecalciferol 50.000 units daily       RTC in 6 months     Case seen, examined, and discussed with Dr. Pena

## 2025-06-12 ENCOUNTER — TELEPHONE (OUTPATIENT)
Dept: GENETICS | Facility: CLINIC | Age: 35
End: 2025-06-12
Payer: MEDICAID

## 2025-06-12 NOTE — TELEPHONE ENCOUNTER
Genetics note:    Discussed with Pharmacy ([facility]ia) and Medicaid (Alexys) confirming that both strengths of Vykat (75mg and 150mg) were approved.     Called momJessica Ariza One will call her to start the drug and will call to remind when the dose needs to be titrated up.     Baseline HQCT score is 32/26.    Hyperphagia Questionnaire   (1) During the past 2 weeks, how upset did the person generally become when denied a desired food?  ? Not at all upset   ? A little upset   ? Moderately upset  ? Very upset   ? Extremely upset     (2) During the past 2 weeks, how often did the person try to bargain or manipulate to get more food at meals?   ? Never   ? Up to 2 times a week   ? 3 to 6 times a week   ? Every day   ? Several times a day     (3) During the past 2 weeks, how often did the person forage through trash for food? ? Never   ? 1 time   ? 2 times   ? 3 times   ? 4 or more times     (4) During the past 2 weeks, how often did the person get up at night to food seek?   ? Never   ? 1 time   ? 2 times   ? 3 times   ? 4 or more times     (5) During the past 2 weeks, how persistent was the person in asking or looking for food after being told “no” or “no more”?   ? Not at all persistent   ? A little persistent   ? Moderately persistent   ? Very persistent   ? Extremely persistent     (6) During the past 2 weeks, outside of normal meal times, how much time did the person generally spend asking or talking about food?   ? Less than 5 minutes a day   ? 5 to 15 minutes a day   ? 15 to 30 minutes a day   ? 30 minutes to 1 hour a day   ? More than 1 hour a day     (7) During the past 2 weeks, how often did the person try to sneak or steal food (that you are aware of)?   ? Never   ? 1 time   ? 2 times   ? 3 times   ? 4 or more times     (8) During the past 2 weeks, when others tried to stop the person from asking about food, how distressed did he or she generally appear?   ? Not at all distressed   ? A little distressed   ?  Moderately distressed   ? Very distressed   ? Extremely distressed     (9) During the past 2 weeks, how often did food-related behavior interfere with the person's normal daily activities, such as self-care, recreation, school, or work?   ? Never  ? Up to 2 times a week   ? 3 to 6 times a week   ? Every day   ? Several times a day    Informed mother to check blood sugar twice a week during the first two weeks. Will call her in 2 weeks.     To send a letter re: drug interaction.     Carrington Forrester MD  Medical Geneticist

## 2025-06-15 NOTE — PROGRESS NOTES
Physical Therapy  Physical Therapy Treatment    Patient Name: Lonny Andrews  MRN: [id number]  Today's Date: 6/17/2025  Time Calculation  Start Time: 1000  Stop Time: 1045  Time Calculation (min): 45 min    Referring Physician: Dr. Santa Lawler  Visit number: 8 of med nec  Authorization info: No Auth needed  Insurance Type: Medicaid      Current Problem  1. Prader-Willi syndrome (HHS-HCC)  Follow Up In Physical Therapy      2. Unsteady gait when walking  Follow Up In Physical Therapy      3. Generalized joint pain  Follow Up In Physical Therapy                   Precautions          Performing HEP: yes      Subjective   Patient reports no pain today. States he [place] around the zoo after last PT session.    Objective   Posture: FH, RS  Gait: normal    Treatment:        Nustep x5 min  Hamstring stretch R/L on step  Gastroc stretch on wedge B  Standing quad stretch R/L  Seated piriformis stretch R/L  DBE 2x2'  Bosu step up alt R/L x2  Tandem balance with 4kg KB pass cw/ccw x15 R/L   Stool scoot long stanton x2  Rebounder SLS 4# x10 R/L leg/hand  Leg press 70# 2x15    Deferred:  Ham curl 30# 2x12  Hip ABD/ADD 25# 2x10 ea    Charges: 2 ex, 1 NME    Assessment:   Patient progressing well with treatment. Able to follow all directions and complete all reps without increase discomfort       Plan:   Continue PT x2 more sessions      Darleen Dow, PT

## 2025-06-17 ENCOUNTER — APPOINTMENT (OUTPATIENT)
Dept: BEHAVIORAL HEALTH | Facility: CLINIC | Age: 35
End: 2025-06-17
Payer: MEDICAID

## 2025-06-17 ENCOUNTER — TREATMENT (OUTPATIENT)
Dept: PHYSICAL THERAPY | Facility: CLINIC | Age: 35
End: 2025-06-17
Payer: MEDICAID

## 2025-06-17 VITALS
SYSTOLIC BLOOD PRESSURE: 115 MMHG | WEIGHT: 156.6 LBS | DIASTOLIC BLOOD PRESSURE: 70 MMHG | RESPIRATION RATE: 18 BRPM | HEART RATE: 62 BPM | BODY MASS INDEX: 32.73 KG/M2 | TEMPERATURE: 98.4 F

## 2025-06-17 DIAGNOSIS — M25.50 GENERALIZED JOINT PAIN: ICD-10-CM

## 2025-06-17 DIAGNOSIS — Q87.11 PRADER-WILLI SYNDROME (HHS-HCC): ICD-10-CM

## 2025-06-17 DIAGNOSIS — R26.81 UNSTEADY GAIT WHEN WALKING: ICD-10-CM

## 2025-06-17 DIAGNOSIS — F06.31 MOOD DISORDER WITH DEPRESSIVE FEATURES DUE TO GENERAL MEDICAL CONDITION: Primary | ICD-10-CM

## 2025-06-17 PROCEDURE — 97112 NEUROMUSCULAR REEDUCATION: CPT | Mod: GP

## 2025-06-17 PROCEDURE — 97110 THERAPEUTIC EXERCISES: CPT | Mod: GP

## 2025-06-17 ASSESSMENT — COLUMBIA-SUICIDE SEVERITY RATING SCALE - C-SSRS
TOTAL  NUMBER OF INTERRUPTED ATTEMPTS SINCE LAST CONTACT: NO
TOTAL  NUMBER OF ABORTED OR SELF INTERRUPTED ATTEMPTS SINCE LAST CONTACT: NO
6. HAVE YOU EVER DONE ANYTHING, STARTED TO DO ANYTHING, OR PREPARED TO DO ANYTHING TO END YOUR LIFE?: NO
ATTEMPT LIFETIME: NO
SUICIDE, SINCE LAST CONTACT: NO
1. HAVE YOU WISHED YOU WERE DEAD OR WISHED YOU COULD GO TO SLEEP AND NOT WAKE UP?: NO
ATTEMPT SINCE LAST CONTACT: NO
6. HAVE YOU EVER DONE ANYTHING, STARTED TO DO ANYTHING, OR PREPARED TO DO ANYTHING TO END YOUR LIFE?: NO
TOTAL  NUMBER OF INTERRUPTED ATTEMPTS LIFETIME: NO
TOTAL  NUMBER OF ABORTED OR SELF INTERRUPTED ATTEMPTS LIFETIME: NO
2. HAVE YOU ACTUALLY HAD ANY THOUGHTS OF KILLING YOURSELF?: NO
2. HAVE YOU ACTUALLY HAD ANY THOUGHTS OF KILLING YOURSELF?: NO

## 2025-06-17 ASSESSMENT — PAIN SCALES - GENERAL: PAINLEVEL_OUTOF10: 0-NO PAIN

## 2025-06-17 NOTE — PROGRESS NOTES
Patient here at the clinic today to receive his monthly Abilify Maintena 400mg for Mood disorder with depressive features      Patient identified by full name and  and vitals obtained. patient last seen by provider 6/3/25, last seen by nurse on 25  Medication verified by providers note and medication order       Appetite: no change  Sleep: No change  Appearance: clean, age appropriate, well-groomed  Build: average  Attitude: cooperative, calm, pleasant, friendly, open  Eye Contact: normal  Activity: alert, attentive, appropriate  Speech: appropriate & spontaneous, normal  Delusions: patient denies   Thought Content: logical  Thought Process: logical  Judgement/insight: Fair  Mood: calm happy  Affect: appropriate  AH/VH/SI/HI: patient denies  Access to firearms/weapons: No  Depression: patient denies  Thoughts of hopelessness: Patient denies  Anxiety: patient denies  Self-injurious behavior: patient denies  Cravings/Urges: NA  Last use: NA  Tobacco Use: non-smoker  Spiritual or cultural practices that may affect your care or impact your health care decisions: no  Living situation lives with mom whom is his guardian   Employed: No        Patient here at the clinic today to receive his monthly Abilify Maintena for Mood disorder accompanied by his mother. Patient was cooperative and engaged during this visit. Per mom there has been no issues with previous injection and denies any issues with SI/HI, VH/AH, depression and no recent hospitalizations      Patient received injection of Abilify Maintena 400mg/2ml via 23 gauge w/o incident into left deltoid   deltoid area. Patient tolerated injection well, no reaction at injection site.     RN provided education on medications sided effects, the importance of reporting any changes in the injection site, for minor arm pain utilization of ibuprofen and a warm compress should relive any discomfort.  RN has provided patient and mom direct office number for future questions  and the mobile crisis number for any emergent concerns that may arise.      LOT # [id number]  EXP:  Nov 2027  NDC: 62890-516-27        Patient will RTC in 4 weeks     VIANNEY SiddiquiN

## 2025-06-20 ENCOUNTER — APPOINTMENT (OUTPATIENT)
Dept: BEHAVIORAL HEALTH | Facility: CLINIC | Age: 35
End: 2025-06-20
Payer: MEDICAID

## 2025-06-22 NOTE — PROGRESS NOTES
Physical Therapy  Physical Therapy Treatment    Patient Name: Lonny Andrews  MRN: [id number]  Today's Date: 6/24/2025  Time Calculation  Start Time: 1000  Stop Time: 1045  Time Calculation (min): 45 min    Referring Physician: Dr. Santa Lawler  Visit number: 9 of med nec  Authorization info: No Auth needed  Insurance Type: Medicaid      Current Problem  1. Prader-Willi syndrome (HHS-HCC)  Follow Up In Physical Therapy      2. Unsteady gait when walking  Follow Up In Physical Therapy      3. Generalized joint pain  Follow Up In Physical Therapy                     Precautions  Precautions  STEADI Fall Risk Score (The score of 4 or more indicates an increased risk of falling): 7    0-10 (Numeric) Pain Score: 0 - No pain  Performing HEP: yes      Subjective   Patient states he did his exercise video several times since last visit. He achieved 3 miles with the video. Also took several walks outside. No complaint of pain.    Objective   Endurance: very good    Treatment:        Nustep x5 min  Hamstring stretch R/L on step  Gastroc stretch on wedge B  Standing quad stretch R/L  Ladder drills: march, side step, 2 in/2 out, 2 in/2 out lateral, ickky, braiding  Sport cord F/B march x2' ea  Sport cord side step(3) x5 R/L    Deferred:   DBE 2x2'  Bosu step up alt R/L x2  Tandem balance with 4kg KB pass cw/ccw x15 R/L   Stool scoot long stanton x2  Rebounder SLS 4# x10 R/L leg/hand  Leg press 70# 2x15  Ham curl 30# 2x12  Hip ABD/ADD 25# 2x10 ea    Charges: 2 ex, 1 NME    Assessment:   Patient follows directions very well and demonstrates good agility and endurance.       Plan:   Re-assess next session for probable discharge.      Darleen Dow, PT

## 2025-06-24 ENCOUNTER — APPOINTMENT (OUTPATIENT)
Dept: PRIMARY CARE | Facility: CLINIC | Age: 35
End: 2025-06-24
Payer: MEDICAID

## 2025-06-24 ENCOUNTER — TREATMENT (OUTPATIENT)
Dept: PHYSICAL THERAPY | Facility: CLINIC | Age: 35
End: 2025-06-24
Payer: MEDICAID

## 2025-06-24 VITALS
DIASTOLIC BLOOD PRESSURE: 75 MMHG | WEIGHT: 156 LBS | BODY MASS INDEX: 30.63 KG/M2 | HEART RATE: 71 BPM | OXYGEN SATURATION: 100 % | SYSTOLIC BLOOD PRESSURE: 110 MMHG | HEIGHT: 60 IN

## 2025-06-24 DIAGNOSIS — R26.81 UNSTEADY GAIT WHEN WALKING: ICD-10-CM

## 2025-06-24 DIAGNOSIS — Z00.00 WELLNESS EXAMINATION: Primary | ICD-10-CM

## 2025-06-24 DIAGNOSIS — Q87.11 PRADER-WILLI SYNDROME (HHS-HCC): ICD-10-CM

## 2025-06-24 DIAGNOSIS — M25.50 GENERALIZED JOINT PAIN: ICD-10-CM

## 2025-06-24 DIAGNOSIS — Z01.84 IMMUNITY STATUS TESTING: ICD-10-CM

## 2025-06-24 PROCEDURE — 97110 THERAPEUTIC EXERCISES: CPT | Mod: GP

## 2025-06-24 PROCEDURE — 97112 NEUROMUSCULAR REEDUCATION: CPT | Mod: GP

## 2025-06-24 PROCEDURE — 3008F BODY MASS INDEX DOCD: CPT | Performed by: STUDENT IN AN ORGANIZED HEALTH CARE EDUCATION/TRAINING PROGRAM

## 2025-06-24 PROCEDURE — 99395 PREV VISIT EST AGE 18-39: CPT | Performed by: STUDENT IN AN ORGANIZED HEALTH CARE EDUCATION/TRAINING PROGRAM

## 2025-06-24 PROCEDURE — 1036F TOBACCO NON-USER: CPT | Performed by: STUDENT IN AN ORGANIZED HEALTH CARE EDUCATION/TRAINING PROGRAM

## 2025-06-24 ASSESSMENT — PAIN SCALES - GENERAL: PAINLEVEL_OUTOF10: 0 - NO PAIN

## 2025-06-24 NOTE — PROGRESS NOTES
36-year-old male presenting for CPE.  Doing relatively well without any significant new concerns or interval changes.  Chronic conditions stable.    12 point ROS reviewed and negative other than as stated in HPI     General: Alert, oriented, pleasant, in no acute distress  HEENT:   Head: normocephalic, atraumatic;   eyes: EOMI, no scleral icterus;  Neck: soft, supple, non-tender, no masses appreciated  CV: Heart with regular rate and rhythm, normal S1/S2, no murmurs  Lungs: CTAB without wheezing, rhonchi or rales; good respiratory effort, no increased work of breathing  Abdomen: soft, non-tender, non-distended, no masses appreciated  Extremities: no edema, no cyanosis  Neuro: Cranial nerves grossly intact; alert and oriented  Psych: Appropriate mood and affect     #HM  - Labs relatively recent and reviewed  -Vaccines:     Flu: In office today     Tdap: 2021     Will get titers for hep B and polio    # Hyperlipidemia  -no medication currently  - Most recent lipids satisfactory     #Prediabetes  - Recent A1c of 5.9     #History of anemia  - Borderline, stable     #Vitamin D deficiency  - Recommend OTC vitamin D3, 2000 units daily     # Prader-Willi syndrome  -Continue with Prader-Willi clinic    #Seborrheic dermatitis  - Can continue ketoconazole shampoo     # Recurrent HSV  - Continue valacyclovir      F/U 6 months, sooner if indicated     Chris D'Amico, DO     Is she doing well on the current dose and still losing weight? If so we can keep her at the current dose.

## 2025-06-25 ENCOUNTER — APPOINTMENT (OUTPATIENT)
Dept: BEHAVIORAL HEALTH | Facility: CLINIC | Age: 35
End: 2025-06-25
Payer: MEDICAID

## 2025-06-25 VITALS
RESPIRATION RATE: 18 BRPM | WEIGHT: 154.9 LBS | TEMPERATURE: 98.1 F | HEART RATE: 66 BPM | DIASTOLIC BLOOD PRESSURE: 73 MMHG | BODY MASS INDEX: 32.37 KG/M2 | SYSTOLIC BLOOD PRESSURE: 113 MMHG

## 2025-06-25 DIAGNOSIS — F90.2 ATTENTION DEFICIT HYPERACTIVITY DISORDER (ADHD), COMBINED TYPE: ICD-10-CM

## 2025-06-25 DIAGNOSIS — Q87.11 PRADER-WILLI SYNDROME (HHS-HCC): ICD-10-CM

## 2025-06-25 DIAGNOSIS — F06.31 MOOD DISORDER WITH DEPRESSIVE FEATURES DUE TO GENERAL MEDICAL CONDITION: ICD-10-CM

## 2025-06-25 DIAGNOSIS — G47.8 POOR SLEEP PATTERN: ICD-10-CM

## 2025-06-25 DIAGNOSIS — F43.10 PTSD (POST-TRAUMATIC STRESS DISORDER): ICD-10-CM

## 2025-06-25 PROCEDURE — 99215 OFFICE O/P EST HI 40 MIN: CPT | Performed by: NURSE PRACTITIONER

## 2025-06-25 RX ORDER — DEXTROAMPHETAMINE SULFATE, DEXTROAMPHETAMINE SACCHARATE, AMPHETAMINE SULFATE AND AMPHETAMINE ASPARTATE 7.5; 7.5; 7.5; 7.5 MG/1; MG/1; MG/1; MG/1
30 CAPSULE, EXTENDED RELEASE ORAL EVERY MORNING
Qty: 30 CAPSULE | Refills: 0 | Status: SHIPPED | OUTPATIENT
Start: 2025-08-24 | End: 2025-09-23

## 2025-06-25 RX ORDER — SERTRALINE HYDROCHLORIDE 100 MG/1
200 TABLET, FILM COATED ORAL DAILY
Qty: 60 TABLET | Refills: 3 | Status: SHIPPED | OUTPATIENT
Start: 2025-06-25 | End: 2025-10-23

## 2025-06-25 RX ORDER — DEXTROAMPHETAMINE SULFATE, DEXTROAMPHETAMINE SACCHARATE, AMPHETAMINE SULFATE AND AMPHETAMINE ASPARTATE 1.25; 1.25; 1.25; 1.25 MG/1; MG/1; MG/1; MG/1
5 CAPSULE, EXTENDED RELEASE ORAL
Qty: 30 CAPSULE | Refills: 0 | Status: SHIPPED | OUTPATIENT
Start: 2025-08-24 | End: 2025-09-23

## 2025-06-25 RX ORDER — PRAZOSIN HYDROCHLORIDE 2 MG/1
4 CAPSULE ORAL NIGHTLY
Qty: 60 CAPSULE | Refills: 3 | Status: SHIPPED | OUTPATIENT
Start: 2025-06-25

## 2025-06-25 RX ORDER — SERTRALINE HYDROCHLORIDE 50 MG/1
50 TABLET, FILM COATED ORAL DAILY
Qty: 30 TABLET | Refills: 3 | Status: SHIPPED | OUTPATIENT
Start: 2025-06-25 | End: 2025-10-23

## 2025-06-25 RX ORDER — DEXTROAMPHETAMINE SULFATE, DEXTROAMPHETAMINE SACCHARATE, AMPHETAMINE SULFATE AND AMPHETAMINE ASPARTATE 7.5; 7.5; 7.5; 7.5 MG/1; MG/1; MG/1; MG/1
30 CAPSULE, EXTENDED RELEASE ORAL EVERY MORNING
Qty: 30 CAPSULE | Refills: 0 | Status: SHIPPED | OUTPATIENT
Start: 2025-07-25 | End: 2025-08-24

## 2025-06-25 RX ORDER — DEXTROAMPHETAMINE SULFATE, DEXTROAMPHETAMINE SACCHARATE, AMPHETAMINE SULFATE AND AMPHETAMINE ASPARTATE 1.25; 1.25; 1.25; 1.25 MG/1; MG/1; MG/1; MG/1
5 CAPSULE, EXTENDED RELEASE ORAL
Qty: 30 CAPSULE | Refills: 0 | Status: SHIPPED | OUTPATIENT
Start: 2025-07-25 | End: 2025-08-24

## 2025-06-25 RX ORDER — DEXTROAMPHETAMINE SULFATE, DEXTROAMPHETAMINE SACCHARATE, AMPHETAMINE SULFATE AND AMPHETAMINE ASPARTATE 1.25; 1.25; 1.25; 1.25 MG/1; MG/1; MG/1; MG/1
5 CAPSULE, EXTENDED RELEASE ORAL
Qty: 30 CAPSULE | Refills: 0 | Status: SHIPPED | OUTPATIENT
Start: 2025-06-25 | End: 2025-07-25

## 2025-06-25 RX ORDER — DEXTROAMPHETAMINE SULFATE, DEXTROAMPHETAMINE SACCHARATE, AMPHETAMINE SULFATE AND AMPHETAMINE ASPARTATE 7.5; 7.5; 7.5; 7.5 MG/1; MG/1; MG/1; MG/1
30 CAPSULE, EXTENDED RELEASE ORAL EVERY MORNING
Qty: 30 CAPSULE | Refills: 0 | Status: SHIPPED | OUTPATIENT
Start: 2025-06-25 | End: 2025-07-25

## 2025-06-25 RX ORDER — ARIPIPRAZOLE 400 MG
400 KIT INTRAMUSCULAR
Qty: 1 EACH | Refills: 3 | Status: SHIPPED | OUTPATIENT
Start: 2025-06-25

## 2025-06-25 ASSESSMENT — PAIN SCALES - GENERAL: PAINLEVEL_OUTOF10: 0-NO PAIN

## 2025-06-25 ASSESSMENT — PATIENT HEALTH QUESTIONNAIRE - PHQ9
5. POOR APPETITE OR OVEREATING: NOT AT ALL
1. LITTLE INTEREST OR PLEASURE IN DOING THINGS: NOT AT ALL
2. FEELING DOWN, DEPRESSED OR HOPELESS: NOT AT ALL
6. FEELING BAD ABOUT YOURSELF - OR THAT YOU ARE A FAILURE OR HAVE LET YOURSELF OR YOUR FAMILY DOWN: NOT AT ALL
8. MOVING OR SPEAKING SO SLOWLY THAT OTHER PEOPLE COULD HAVE NOTICED. OR THE OPPOSITE, BEING SO FIGETY OR RESTLESS THAT YOU HAVE BEEN MOVING AROUND A LOT MORE THAN USUAL: NOT AT ALL
9. THOUGHTS THAT YOU WOULD BE BETTER OFF DEAD, OR OF HURTING YOURSELF: NOT AT ALL
4. FEELING TIRED OR HAVING LITTLE ENERGY: SEVERAL DAYS
7. TROUBLE CONCENTRATING ON THINGS, SUCH AS READING THE NEWSPAPER OR WATCHING TELEVISION: SEVERAL DAYS
3. TROUBLE FALLING OR STAYING ASLEEP OR SLEEPING TOO MUCH: MORE THAN HALF THE DAYS

## 2025-06-25 NOTE — PATIENT INSTRUCTIONS
1. Continue Adderall XR 5 mg by mouth at noon as booster for ADHD. Not able to get name brand in IR. Therefore, XR ordered.  2. Continue Adderall XR 30 mg by mouth daily for ADHD- must be name brand. I have personally reviewed the OARRS report 6/25/25. I have considered the risks of abuse, dependence, addiction and diversion.  Stim agreement signed 7/3/2024  Urine amphetamine and drug screen 12/27/24 in office   3. Continue sertraline (Zoloft) 250 mg by mouth daily for PTSD and skin excoriation  4. Continue Prazosin 4 mg by mouth at bedtime for nightmares related to PTSD.   5. Continue Abilify Maintena 400 mg IM Q 4 weeks for moods   6. Risks, benefits, alternatives, off-label uses, and side effects of medications have been discussed with patient/caregiver. There is no report of signs/symptoms consistent with medication-induced impairment in daily functioning. At this time, benefits of medication felt to outweigh potential risks. Will continue to reassess need for psychotropic medication at regular 3 month intervals.  7. Return to clinic 3 months for an in-person appointment or earlier if needed. Call [phone] to reschedule.  8. Mom will obtain pharmacogenomics testing (PGT) results from previous MH & scan to [email or web address] or bring to office   9. Discussed  recommendation yesterday: Increase Physical Activity to 60 mins daily (15 mins/4 times daily) with resistance training. Worked on routine schedule: 8 AM, 12 PM, 4 PM, & 9 PM. Motivation: will go to [place]. Includes walking the dog or cat.  10. Expert Eval completed and given to Guardian in assessment  11. Day program medication permit completed and given in assessment.  12. Referral sent to Dr. Stover in Genetics for Therapy.  Thank you for seeing me today. If you have any questions, do not hesitate to contact my office.  Gwendolyn Anthony    TREATMENT TYPE         counseling and coordination of care; addressing signs and  symptoms of illness; risks/benefits and side effects of medications; and behavioral approaches to illness.  This note was created using electronic dictation. There may be errors in syntax and meaning. Please contact the office with any questions.
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
## 2025-06-27 RX ORDER — TESTOSTERONE CYPIONATE 200 MG/ML
50 INJECTION, SOLUTION INTRAMUSCULAR
COMMUNITY

## 2025-06-28 LAB
HBV SURFACE AB SERPL IA-ACNC: 10 MIU/ML
PV1 AB TITR SER: NORMAL {TITER}
PV3 AB TITR SER: NORMAL {TITER}

## 2025-06-29 NOTE — PROGRESS NOTES
Physical Therapy  Physical Therapy Orthopedic Progress Note    Patient Name: Lonny Andrews  MRN: [id number]  Today's Date: 7/1/2025  Time Calculation  Start Time: 1000  Stop Time: 1045  Time Calculation (min): 45 min    Referring Physician: Dr. Santa Lawler  Visit number: 10 of med nec  Authorization info: No Auth needed  Insurance Type: Medicaid      Current Problem  1. Prader-Willi syndrome (HHS-HCC)  Follow Up In Physical Therapy      2. Unsteady gait when walking  Follow Up In Physical Therapy      3. Generalized joint pain  Follow Up In Physical Therapy             Precautions:  Precautions  STEADI Fall Risk Score (The score of 4 or more indicates an increased risk of falling): 7    HEP: partial    Subjective   Patient reports he has no pain. Not as good about HEP as last week    Current Condition:    better     PAIN  0-10 (Numeric) Pain Score: 0 - No pain      Self Reported Function (0-100%) = 100%      Objective   Posture: R shoulder higher, pronation B  Palpation: No significant pain to palpation over spine, shoulders, hips or knees  Gait: proper heel strike/toe off. Pronation  Balance: SLS x10 sec R/L  UE Strength: 4+ proximal, 5 distal  Scapular strength: 4+ B  LE Strength: 4+ proximal, 5 distal  LE flexibility: WNL  Core Strength: 4        Outcome Measures: Updated 7/1/2025  Other Measures  Lower Extremity Funtional Score (LEFS): 80           Treatments:    Nustep x5 min  Re-assess goals  Review and perform HEP. Patient given list form of HEP  Ladder drills: walk, march, side step, 2 in/2 out, 2 in/2 out lateral, ickky, braiding  Sport cord F/B march x2' ea  Sport cord side step(3) x5 R/L       Access Code: [email or web address]     Charges: 2 ex, 1 NME  Assessment:   Goals achieved. Patient did wonderful in PT. At this time, I feel patient is ready to progress independently. Patient given 30 day pass for [facility].     Goals: Updated 7/1/2025  Resolved       PT Problem       STG (Met)       Start:   04/01/25    Expected End:  06/30/25    Resolved:  07/01/25    STG's to be achieved in 6 weeks    1. Decrease joint pain 50% with activity- goal achieved  2. Increase  LEFS by 6 points to help improve ADL's - goal achieved  3. Increase strength 1/2 muscle grade to help improve endurance - goal achieved  4. Increase hamstring/calf ROM by 10% for improved daily function- goal achieved  5. Patient able to participate in physical activity x45 min without increase in symptoms - goal achieved  6. Demonstrate proper posture with exercise - goal achieved           LTG (Met)       Start:  04/01/25    Expected End:  06/30/25    Resolved:  07/01/25    LTG's to be achieved in 12 weeks    1. Decrease joint pain to tolerable with activity - goal achieved  2. Increase LEFS by 9 points to help improve ADL's - goal achieved  3. Increase strength 1 muscle grade to help improve endurance - in progress  4. Increase hamstring/calf ROM by 50% for improved daily function - goal achieved  5. Patient able to ambulate >30 min with 50% less pain - goal achieved  6. Patient to go to local gym at least 1x/wk - in progress  7. Patient to be independent in daily HEP - goal achieved                 Plan of Care: Updated 7/1/2025   Discharge PT. Encourage continuation of HEP for best results.       Darleen Dow, PT, OCS

## 2025-07-01 ENCOUNTER — TREATMENT (OUTPATIENT)
Dept: PHYSICAL THERAPY | Facility: CLINIC | Age: 35
End: 2025-07-01
Payer: MEDICAID

## 2025-07-01 DIAGNOSIS — Q87.11 PRADER-WILLI SYNDROME (HHS-HCC): ICD-10-CM

## 2025-07-01 DIAGNOSIS — M25.50 GENERALIZED JOINT PAIN: ICD-10-CM

## 2025-07-01 DIAGNOSIS — R26.81 UNSTEADY GAIT WHEN WALKING: ICD-10-CM

## 2025-07-01 PROCEDURE — 97112 NEUROMUSCULAR REEDUCATION: CPT | Mod: GP

## 2025-07-01 PROCEDURE — 97110 THERAPEUTIC EXERCISES: CPT | Mod: GP

## 2025-07-01 ASSESSMENT — PAIN SCALES - GENERAL: PAINLEVEL_OUTOF10: 0 - NO PAIN

## 2025-07-02 ENCOUNTER — APPOINTMENT (OUTPATIENT)
Dept: ENDOCRINOLOGY | Facility: CLINIC | Age: 35
End: 2025-07-02
Payer: MEDICAID

## 2025-07-02 DIAGNOSIS — E29.1 HYPOGONADISM IN MALE: ICD-10-CM

## 2025-07-02 PROCEDURE — 96372 THER/PROPH/DIAG INJ SC/IM: CPT | Performed by: INTERNAL MEDICINE

## 2025-07-02 PROCEDURE — RXMED WILLOW AMBULATORY MEDICATION CHARGE

## 2025-07-02 RX ADMIN — TESTOSTERONE CYPIONATE 100 MG: 200 INJECTION, SOLUTION INTRAMUSCULAR at 10:10

## 2025-07-03 ENCOUNTER — PHARMACY VISIT (OUTPATIENT)
Dept: PHARMACY | Facility: CLINIC | Age: 35
End: 2025-07-03
Payer: COMMERCIAL

## 2025-07-03 ENCOUNTER — TELEPHONE (OUTPATIENT)
Dept: GENETICS | Facility: CLINIC | Age: 35
End: 2025-07-03

## 2025-07-03 ENCOUNTER — APPOINTMENT (OUTPATIENT)
Dept: ENDOCRINOLOGY | Facility: CLINIC | Age: 35
End: 2025-07-03
Payer: MEDICAID

## 2025-07-03 NOTE — TELEPHONE ENCOUNTER
Genetics note:    He is taking Vykat. No side effects. Blood sugar has been within normal ranges. OK to check once a week for now. Will call in 1 mo.     Carrington Forrester MD  Medical Geneticist

## 2025-07-08 RX ORDER — TESTOSTERONE CYPIONATE 200 MG/ML
100 INJECTION, SOLUTION INTRAMUSCULAR ONCE
Status: COMPLETED | OUTPATIENT
Start: 2025-07-08 | End: 2025-07-02

## 2025-07-09 ENCOUNTER — CLINICAL SUPPORT (OUTPATIENT)
Dept: SLEEP MEDICINE | Facility: CLINIC | Age: 35
End: 2025-07-09
Payer: MEDICAID

## 2025-07-09 ENCOUNTER — APPOINTMENT (OUTPATIENT)
Dept: ENDOCRINOLOGY | Facility: CLINIC | Age: 35
End: 2025-07-09
Payer: MEDICAID

## 2025-07-09 DIAGNOSIS — G47.19 EXCESSIVE DAYTIME SLEEPINESS: ICD-10-CM

## 2025-07-09 DIAGNOSIS — E29.1 HYPOGONADISM MALE: ICD-10-CM

## 2025-07-09 PROCEDURE — 96372 THER/PROPH/DIAG INJ SC/IM: CPT | Performed by: INTERNAL MEDICINE

## 2025-07-09 RX ORDER — TESTOSTERONE CYPIONATE 200 MG/ML
100 INJECTION, SOLUTION INTRAMUSCULAR ONCE
Status: COMPLETED | OUTPATIENT
Start: 2025-07-09 | End: 2025-07-09

## 2025-07-09 RX ADMIN — TESTOSTERONE CYPIONATE 100 MG: 200 INJECTION, SOLUTION INTRAMUSCULAR at 10:05

## 2025-07-10 VITALS
OXYGEN SATURATION: 97 % | BODY MASS INDEX: 31.38 KG/M2 | HEIGHT: 60 IN | HEART RATE: 81 BPM | DIASTOLIC BLOOD PRESSURE: 85 MMHG | RESPIRATION RATE: 17 BRPM | WEIGHT: 159.83 LBS | SYSTOLIC BLOOD PRESSURE: 131 MMHG

## 2025-07-10 NOTE — PROGRESS NOTES
[facility] TECH NOTE:     Patient: Lonny Andrews   MRN//AGE: [id number]  1990  35 y.o.   Technologist: MARY NAIR   Room: 441A   Service Date: 7/10/2025        Sleep Testing Location: [facility]:     TECHNOLOGIST SLEEP STUDY PROCEDURE NOTE:   This sleep study is being conducted according to the policies and procedures outlined by the AAS accreditation standards.  The sleep study procedure and processes involved during this appointment was explained to the patient/patient’s family, questions were answered. The patient/family verbalized understanding.      The patient is a 35 y.o. year old male scheduled for a Diagnostic PSG Split night with montage of:  PSG. he arrived for his appointment.      The study that was ultimately completed was a PSG with montage of:  PSG.    The full study Was completed.  Patient questionnaires completed?: yes     Consents signed? yes    Initial Fall Risk Screening:     Lonny has not fallen in the last 6 months. his fall did not result in injury. Lonny does not have a fear of falling. He does not need assistance with sitting, standing, or walking. he does not need assistance walking in his home. he does not need assistance in an unfamiliar setting. The patient is not using an assistive device.     Brief Study observations: Split night unmet. Snoring, Frequent arousals, Unusual body movements, Leg movements, O2 Desats, Respiratory events observed throughout study.      Deviation to order/protocol and reason: Split night unmet.       If PAP, which was preferred mask/pressure/mode: N/A      Other:None    After the procedure, the patient/family was informed to ensure followup with ordering clinician for testing results.      Technologist: MARY NAIR

## 2025-07-10 NOTE — PROGRESS NOTES
Received Testosterone in [facility].  Stated he had no side effects from last week's injection.  Used the department of Medicine supply.

## 2025-07-11 PROCEDURE — RXMED WILLOW AMBULATORY MEDICATION CHARGE

## 2025-07-15 ENCOUNTER — APPOINTMENT (OUTPATIENT)
Dept: BEHAVIORAL HEALTH | Facility: CLINIC | Age: 35
End: 2025-07-15
Payer: MEDICAID

## 2025-07-15 ENCOUNTER — PHARMACY VISIT (OUTPATIENT)
Dept: PHARMACY | Facility: CLINIC | Age: 35
End: 2025-07-15
Payer: COMMERCIAL

## 2025-07-15 VITALS
WEIGHT: 165.6 LBS | TEMPERATURE: 98 F | HEART RATE: 92 BPM | DIASTOLIC BLOOD PRESSURE: 73 MMHG | SYSTOLIC BLOOD PRESSURE: 116 MMHG | RESPIRATION RATE: 18 BRPM | BODY MASS INDEX: 34.62 KG/M2

## 2025-07-15 DIAGNOSIS — F06.31 MOOD DISORDER WITH DEPRESSIVE FEATURES DUE TO GENERAL MEDICAL CONDITION: Primary | ICD-10-CM

## 2025-07-15 ASSESSMENT — PAIN SCALES - GENERAL: PAINLEVEL_OUTOF10: 0-NO PAIN

## 2025-07-15 ASSESSMENT — COLUMBIA-SUICIDE SEVERITY RATING SCALE - C-SSRS
ATTEMPT SINCE LAST CONTACT: NO
2. HAVE YOU ACTUALLY HAD ANY THOUGHTS OF KILLING YOURSELF?: NO
6. HAVE YOU EVER DONE ANYTHING, STARTED TO DO ANYTHING, OR PREPARED TO DO ANYTHING TO END YOUR LIFE?: NO
TOTAL  NUMBER OF INTERRUPTED ATTEMPTS LIFETIME: NO
TOTAL  NUMBER OF INTERRUPTED ATTEMPTS SINCE LAST CONTACT: NO
ATTEMPT LIFETIME: NO
1. HAVE YOU WISHED YOU WERE DEAD OR WISHED YOU COULD GO TO SLEEP AND NOT WAKE UP?: NO
1. SINCE LAST CONTACT, HAVE YOU WISHED YOU WERE DEAD OR WISHED YOU COULD GO TO SLEEP AND NOT WAKE UP?: NO
6. HAVE YOU EVER DONE ANYTHING, STARTED TO DO ANYTHING, OR PREPARED TO DO ANYTHING TO END YOUR LIFE?: NO
SUICIDE, SINCE LAST CONTACT: NO
TOTAL  NUMBER OF ABORTED OR SELF INTERRUPTED ATTEMPTS LIFETIME: NO
TOTAL  NUMBER OF ABORTED OR SELF INTERRUPTED ATTEMPTS SINCE LAST CONTACT: NO

## 2025-07-15 NOTE — PROGRESS NOTES
Patient here at the clinic today to receive his monthly Abilify Maintena 400mg for Mood disorder with depressive features      Patient identified by full name and  and vitals obtained. patient last seen by provider 25, last seen by nurse on 25  Medication verified by providers note and medication order       Appetite: no change  Sleep: No change  Appearance: clean, age appropriate, well-groomed  Build: average  Attitude: cooperative, calm, pleasant, friendly, open  Eye Contact: normal  Activity: alert, attentive, appropriate  Speech: appropriate & spontaneous, normal  Delusions: patient denies   Thought Content: logical  Thought Process: logical  Judgement/insight: Fair  Mood: calm happy  Affect: appropriate  AH/VH/SI/HI: patient denies  Access to firearms/weapons: No  Depression: patient denies  Thoughts of hopelessness: Patient denies  Anxiety: patient denies  Self-injurious behavior: patient denies  Cravings/Urges: NA  Last use: NA  Tobacco Use: non-smoker  Spiritual or cultural practices that may affect your care or impact your health care decisions: no  Living situation lives with mom whom is his guardian   Employed: No        Patient here at the clinic today to receive his monthly Abilify Maintena for Mood disorder accompanied by his mother. Patient was cooperative and engaged during this visit. Per mom there has been no issues with previous injection and denies any issues with SI/HI, VH/AH, depression and no recent hospitalizations      Patient received injection of Abilify Maintena 400mg/2ml via 23 gauge w/o incident into right deltoid   deltoid area. Patient tolerated injection well, no reaction at injection site.     RN provided education on medications sided effects, the importance of reporting any changes in the injection site, for minor arm pain utilization of ibuprofen and a warm compress should relive any discomfort.  RN has provided patient and mom direct office number for future  questions and the mobile crisis number for any emergent concerns that may arise.      LOT # [id number]  EXP:  Sept. 2027  NDC: 38620-530-37        Patient will RTC in 4 weeks     VIANNEY SiddiquiN

## 2025-07-16 ENCOUNTER — APPOINTMENT (OUTPATIENT)
Dept: ENDOCRINOLOGY | Facility: CLINIC | Age: 35
End: 2025-07-16
Payer: MEDICAID

## 2025-07-16 DIAGNOSIS — E29.1 HYPOGONADISM MALE: ICD-10-CM

## 2025-07-16 PROCEDURE — 96372 THER/PROPH/DIAG INJ SC/IM: CPT | Performed by: STUDENT IN AN ORGANIZED HEALTH CARE EDUCATION/TRAINING PROGRAM

## 2025-07-16 RX ORDER — TESTOSTERONE CYPIONATE 200 MG/ML
100 INJECTION, SOLUTION INTRAMUSCULAR ONCE
Status: COMPLETED | OUTPATIENT
Start: 2025-07-16 | End: 2025-07-16

## 2025-07-16 RX ORDER — TESTOSTERONE CYPIONATE 1000 MG/10ML
100 INJECTION, SOLUTION INTRAMUSCULAR ONCE
Status: CANCELLED | OUTPATIENT
Start: 2025-07-16

## 2025-07-16 RX ADMIN — TESTOSTERONE CYPIONATE 100 MG: 200 INJECTION, SOLUTION INTRAMUSCULAR at 11:00

## 2025-07-16 NOTE — PROGRESS NOTES
Patient in office today for testosterone cypionate injection. Patient given IM injection into right upper gluteus priya. Patient tolerated well, no adverse reaction from previous week injection. No c/o pain or discomfort at this time. Stock medication used.

## 2025-07-19 ENCOUNTER — RESULTS FOLLOW-UP (OUTPATIENT)
Dept: PRIMARY CARE | Facility: CLINIC | Age: 35
End: 2025-07-19
Payer: MEDICAID

## 2025-07-19 NOTE — RESULT ENCOUNTER NOTE
Sleep study does show severe sleep apnea.  It is recommended starting AutoPap 4-12 cm H2O.  If amenable, I will put in Pap order.

## 2025-07-21 ENCOUNTER — APPOINTMENT (OUTPATIENT)
Dept: RHEUMATOLOGY | Facility: CLINIC | Age: 35
End: 2025-07-21
Payer: MEDICAID

## 2025-07-21 DIAGNOSIS — G47.33 OSA (OBSTRUCTIVE SLEEP APNEA): Primary | ICD-10-CM

## 2025-07-21 NOTE — TELEPHONE ENCOUNTER
----- Message from Christopher D'Amico sent at 7/19/2025  1:33 PM EDT -----  Sleep study does show severe sleep apnea.  It is recommended starting AutoPap 4-12 cm H2O.  If amenable, I will put in Pap order.  ----- Message -----  From: Sukhdev Heath - Lab Results In  Sent: 7/19/2025  12:10 PM EDT  To: Christopher D'Amico, DO

## 2025-07-21 NOTE — TELEPHONE ENCOUNTER
Result Communication    Resulted Orders   In-Center Sleep Study (Non-Sleep Provider)    Narrative    RECORDTYPE: PSG  CPT: 23752 PSG (>=6y)  CSN: [id number]  SCHRECDATE: 2025 19:50:18  LOCATION_CODE: [id number]  PROCDUR: 410.5 min    Sleep Medicine   at [facility] [address]  [address] [place] [address]  [phone]    Diagnostic Polysomnography Report    Patient: KYLE COSTA                MRN: [id number]                :   1990  Study Date: 2025                      Height: 147.3 cm             Age: 35  Study Type: PSG; 21047 PSG (>=6y)         Weight: 72.5 kg  BMI: 33.4 Sex:   Male  Referring Clinician: CHRISTOPHER D'AMICO  Neck size: 29.0 cm           ESS:       DIAGNOSIS: Obstructive Sleep Apnea, Adult (G47.33) and Hypersomnia,   Unspecified (G47.10)  CLINICAL SUMMARY   Indication: Patient referred for Snoring/ Breathing problems during sleep,   Excessive daytime sleepiness, Unusual body body movements, Frequent leg   kicks, Daytime fatigue/ Tiredness, Trouble with irritability during the day.   Past Medical History: Allergic rhinitis, Anemia, Hypercholesterolemia,   Hyperglycemia, Obsessive compulsive disorder, PTSD, Poor sleep pattern   Prader-Willi syndrome, Prediabetes, Urinary incontinence, Vitamin D   deficiency, Ocular hypertension, bilateral Myopia of both eyes, Generalized   joint pain  Medications: Abilify Maintena, Acetylcysteine, Adderall XR 5mg, Adderall XR   30mg, Ergocalciferol, Ibuprofen, Ketoconazole, Meloxicam, Multiple Vitamin-   Minerals Oral, Prazosin, Rocklatan, Sertraline 50mg, Sertraline 100mg,   Testosterone Cypionate, True Metrix Glucose Meter, True Metrix Glucose Test   Strips, Trueplus Lancets, Valacyclovir.    TECHNICAL OBSERVATIONS / BEHAVIOR SUMMARY   Split night unmet. Snoring, Frequent arousals, Unusual body movements, Leg   movements, O2 Desats, Respiratory events observed throughout study.     EEG / SLEEP SUMMARY  The nocturnal  sleep study demonstrated acceptable sleep onset and normal REM   sleep latency, total sleep time was 227.0 minutes, and the sleep efficiency   was 55.3%. The limited sleep EEG montage demonstrated appropriate waveforms   without epileptiform discharges.  PERIODIC LIMB MOVEMENT SUMMARY  No significant periodic limb movements of sleep were noted during this   diagnostic study.  The periodic limb movement index during sleep was 0.0/hr,   with 0.0% associated with arousals. The periodic limb movement arousal index   during sleep was 0.0/hr.  RESPIRATORY SUMMARY  This study was performed on room air. The RDI3% was 43.3/hr, RDI4% was 2.1/hr   and RUSS was 1.1/hr.  During sleep, based on RDI3%, the breathing pattern   demonstrated severe sleep disordered breathing, characterized predominantly   by obstructive events.  The mean oxygen saturation was 99.0% during wake and   98.0% during sleep. The oxygen saturation was <= 88% for 0.3 minutes. The   SpO2 claudia was 92.0%.        CO2 analysis: N/A  CARDIAC SUMMARY  The mean heart rate during wake was 66 bpm and during sleep was 65 bpm. The   cardiac rhythm demonstrated normal sinus rhythm.     IMPRESSION    Based on the AASM recommended definition, the sleep study is consistent with   a diagnosis of severe obstructive sleep apnea.  The Sp02 lcaudia was 92.0%.   Based on the CMS definition, no significant sleep-related breathing disorder   is observed in this study.  The Sp02 claudia was 92.0%. The discrepancy in   severity between the AASM and CMS definition is primarily due to the presence   of arousal-based disordered breathing events observed in this patient.   No significant periodic limb movements of sleep were noted.   Fragmentation of sleep noted during this study appeared to be due to frequent   respiratory arousals.   RECOMMENDATIONS   If PAP therapy will be pursued, consider empiric initiation of auto-adjusting   CPAP with settings of 4-12 cm H2O with close clinic  follow-up and monitoring   of PAP data to ensure control of the patient's sleep apnea. If necessary, a   dedicated titration study may be considered.   If PAP therapy is not covered by insurance due to not meeting the CMS   criteria, a repeat diagnostic sleep study can be considered. Alternatively,   the patient could purchase a CPAP device on their own.   General guidelines for conservative and behavioral management of ALEXANDRE:  â€ƒ1) Consider positional therapy (non-supine sleeping position).  â€ƒ2) Avoid sedating medications, alcohol, and tobacco, if applicable.  â€ƒ3) Consider counseling concerning body weight reduction under medical   supervision, if applicable.   The excessive daytime sleepiness as denoted by patient's Decker sleepiness   scale appears to be out of proportion to the degree of sleep disordered   breathing observed in this study. Other causes of excessive daytime   sleepiness such as narcolepsy, idiopathic hypersomnia, etc should also be   considered.  Safety management: Avoid driving vehicle or operating heavy machinery when   sleepy.    FOLLOW-UP  With ordering clinician for further recommendations and management  If clinically indicated, referral to Sleep Medicine Clinic may be considered.   [[phone] ([id number])]  The study raw data and document was personally reviewed and electronically   signed by:   Dr. FARIDA PLASCENCIA MD on 7/19/2025 at 12:05 PM  TECHNICAL SUMMARY  This standard diagnostic polysomnogram was performed as per sleep center   protocol. The following channels were recorded: EEG (F3-M2, F4-M1, C3-M2,   C4-M1, O1-M2, O2-M1), EOG (LOC, ALISA), chin EMG, thermistor airflow, nasal   pressure transducer airflow, PAP flow, thoracic and abdominal effort channels   by respiratory inductive plethysmography, ECG, limb EMG on bilateral anterior   tibialis, [upper limb leads on bilateral flexor digitorum superficialis,]   pulse oximetry, body position, microphone for snoring, and synchronized    audio-video analysis. Patient was continually attended and monitored by a   registered sleep technologist.  The study was reviewed in full to ensure the   accuracy and quality of the data. Scoring of hypopneas was based on current   AASM guidelines except in the case of CMS related guidelines. For AASM   guidelines, hypopneas were scored when there was 10-second decrement in the   flow limitation by 30% associated with either a 3% O2 desaturation or   arousal. For CMS, hypopneas were scored when there was 10-second decrement in   flow limitation by 30% associated with a 4% O2 desaturation. Respiratory   Effort Related Arousals (RERAs) were scored if there were decrements in flow   limitation not meeting hypopnea criteria and associated with arousal. The   Respiratory Disturbance Index (RDI) reflects the RERA index + AHI.   ENCOUNTER #: [id number] CSN #: [id number] SCHEDULE DATE: 2025 19:50:18   LOCATION CODE: [id number]            Diagnostic Polysomnography Data Report  Patient: KYLE COSTA    Age: 35      Study Date: 2025  MRN: [id number]                 Sex: Male    Recording Tech: Samantha Cadet  : 1990                BMI: 33.4    Scoring Tech: Shmuel Robert    SLEEP ARCHITECTURE SUMMARY  Lights Out Time: 22:58 PM                     Lights on Time: 05:49 AM  Total Recording Time:      410.5 min (6.8 hr) Initial sleep latency:            0.0 min  Total Sleep Time:          227.0 min (3.8 hr) Initial REM latency:              25.0 min  Sleep Efficiency:          55.3%              Wake after sleep onset (WASO):    183.5 min    Sleep Stages     Time (minutes)     % Sleep Time  Wake             183.5              -  Stage N1         72.5               31.9  Stage N2         85.5               37.7  Stage N3         0.0                0.0  REM              69.0                30.4    AROUSAL SUMMARY  Arousal Type     NREM     REM      Sleep                   Count    Index    Count    Index     Count    Index  Total            131      49.7     91       79.1     222      58.7  Spontaneous      48       18.2     34       29.6     82       21.7  Respiratory      83       31.5     57       49.6     140      37.0  Limb Movement    0        0.0      0        0.0      0        0.0    LIMB MOVEMENT SUMMARY                         NREM   REM    Sleep  Wake                         Count  Index  Count  Index  Count  Index  Count  Index  Total LM               0      0.0    0      0.0    0      0.0    0      0.0  Total LM with arousal  0      0.0    0      0.0    0      0.0    0      0.0  PLMS                   0      0.0    0      0.0    0      0.0    0      0.0  PLM with arousal       0      0.0    0      0.0    0      0.0    0      0.0  % PLM with arousal     0.0%   0.0%   0.0%   -      RESPIRATORY SUMMARY                                                        NREM  REM        All Sleep  Sleep time (hours)                                 2.6   1.1        3.8  Respiratory Event Count       Apneas + Hypopneas (3% or arousal)            103   61         164       Apneas + Hypopneas (4% only)                  3     5          8       Respiratory effort related arousals (RERAs)   0     0          0    Respiratory Event Indices       AHI (3% - AASM)                               39.1  53.0       43.3       AHI (4% - CMS)                                1.1   4.3        2.1       RDI (3% - AASM)                               39.1  53.0       43.3       RDI (4%)                                      1.1   4.3        2.1       Apnea Index                                   Index Percentage Index       Percentage Index Percentage            Obstructive Apnea                        0.4   1.0        0.0         0.0        0.3   0.6            Mixed Apnea                              0.0   0.0        0.0         0.0        0.0   0.0            Central Apnea                            0.0   0.0        3.5         6.6        1.1    2.4       Hypopnea Index            Obstructive Hypopnea (3% or arousal)     38.7  99.0       49.6        93.4       42.0  97.0            Obstructive Hypopnea (4% only)           0.8   66.7       0.9         20.0       0.8   37.5            Central Hypopnea                         0.0   0.0        0.0         0.0        0.0   0.0       RERA index                                                     0.0   0.0        0.0         0.0        0.0   0.0  Respiratory Event Average Duration (seconds)       Apnea Average Time                            17.0  13.0       13.8       Hypopnea Average Time                         22.9  22.2       22.6       Apnea time / Total Respiratory Event Time (%) 0.7   3.9        1.9  Oxygen Desaturation Analysis         Wake    NREM    REM     All Sleep       Mean SpO2 (%)                   99.0    98.0    99.0    98.0       Minimum SpO2 (%)                86.0    92.0    96.0    92.0         Time below SpO2 < 90% (min)     0.7     0.3     0.0     0.3       Time below SpO2 <= 88% (min)    0.7     0.3     0.0     0.3         MARV 3% (per h)                  -       2.3     0.9     1.9       MARV 4% (per h)                  -       1.5     0.9     1.3  Body Position Effects   NREM     REM     All Sleep                          Supine   Other   Supine      Other   Supine   Other       Sleep Time (min)   0.0      158.0   0.0         69.0    0.0      227.0       AHI (AASM)         -        39.1    -           53.0    -        43.3       AHI (CMS)          -        1.1     -           4.3     -        2.1       RDI (AASM)         -        39.1    -           53.0    -        43.3       RDI (4%)           -        1.1     -           4.3     -        2.1       MARV 3%             -        2.3     -           0.9     -        1.9       MARV 4%             -        1.5     -           0.9     -        1.3        CARDIAC SUMMARY  CARDIAC SUMMARY    Heart rate (bpm)          Wake    NREM    REM    All  Sleep      Mean Heart Rate       66      66      63     65      Minimum Heart Rate    54      56      53     53      Maximum Heart Rate    92      90      78     90  CARBON DIOXIDE / OXIMETRY MONITORING SUMMARY  SpO2 Summary                       Wake    NREM    REM  Mean %               99.0    98.0    99.0  Time < 90% (min)     0.7     0.3     0.0  Time <= 88% (min)    0.7     0.3     0.0  Time < 85% (min)     0.6     0.3     0.0  Time < 80% (min)     0.6     0.3     0.0  EtCO2 Summary                     Wake    NREM    REM  Mean               0.0     0.0     0.0  High               0       0       0  Low                0       0       0  Time > 55 mmHg     0.0     0.0     0.0  Time > 50 mmHg     0.0     0.0     0.0  %Time > 50 mmHg    0.0     0.0     0.0  TcCO2 Summary                     Wake    NREM    REM  Mean               0.0     0.0     0.0  High               0       0       0  Low                0       0       0  Time > 55 mmHg     0.0     0.0     0.0  Time > 50 mmHg     0.0     0.0     0.0  %Time > 50 mmHg    0.0     0.0     0.0      HYPNOGRAM        ----------  Report Digitally Signed By:  FARIDA PLASCENCIA MD (7/19/2025 12:10:27 PM)       2:31 PM      Results were successfully communicated with the daughter and they acknowledged their understanding.

## 2025-07-23 ENCOUNTER — APPOINTMENT (OUTPATIENT)
Dept: ENDOCRINOLOGY | Facility: CLINIC | Age: 35
End: 2025-07-23
Payer: MEDICAID

## 2025-07-23 DIAGNOSIS — E29.1 HYPOGONADISM MALE: ICD-10-CM

## 2025-07-23 PROCEDURE — 96372 THER/PROPH/DIAG INJ SC/IM: CPT | Performed by: STUDENT IN AN ORGANIZED HEALTH CARE EDUCATION/TRAINING PROGRAM

## 2025-07-23 RX ORDER — TESTOSTERONE CYPIONATE 200 MG/ML
100 INJECTION, SOLUTION INTRAMUSCULAR ONCE
Status: COMPLETED | OUTPATIENT
Start: 2025-07-23 | End: 2025-07-23

## 2025-07-23 RX ADMIN — TESTOSTERONE CYPIONATE 100 MG: 200 INJECTION, SOLUTION INTRAMUSCULAR at 10:55

## 2025-07-23 NOTE — PROGRESS NOTES
Received Testosterone in [facility].  Used the department of Medicine supply.

## 2025-07-25 ENCOUNTER — APPOINTMENT (OUTPATIENT)
Dept: PHYSICAL MEDICINE AND REHAB | Facility: CLINIC | Age: 35
End: 2025-07-25
Payer: MEDICAID

## 2025-07-25 VITALS — SYSTOLIC BLOOD PRESSURE: 118 MMHG | RESPIRATION RATE: 22 BRPM | DIASTOLIC BLOOD PRESSURE: 78 MMHG | HEART RATE: 92 BPM

## 2025-07-25 DIAGNOSIS — R26.81 UNSTEADY GAIT WHEN WALKING: ICD-10-CM

## 2025-07-25 DIAGNOSIS — Q87.11 PRADER-WILLI SYNDROME (HHS-HCC): ICD-10-CM

## 2025-07-25 DIAGNOSIS — M25.50 GENERALIZED JOINT PAIN: Primary | ICD-10-CM

## 2025-07-25 PROCEDURE — 1036F TOBACCO NON-USER: CPT | Performed by: PHYSICAL MEDICINE & REHABILITATION

## 2025-07-25 PROCEDURE — 99213 OFFICE O/P EST LOW 20 MIN: CPT | Performed by: PHYSICAL MEDICINE & REHABILITATION

## 2025-07-25 ASSESSMENT — PAIN SCALES - GENERAL: PAINLEVEL_OUTOF10: 0-NO PAIN

## 2025-07-25 NOTE — PROGRESS NOTES
"Physical Medicine and Rehabilitation [facility] Consult  07/25/25       Chief Complaint:  Low back pain     HPI:  Lonny Andrews is a  35 y.o. F who presents to the clinic today  for evaluation of arthralgias.    Referred by genetics \"Has Prader-Willi Syndrome and hypotonia, unsteady gait, poor posture - assess to see if could benefit from certain exercises, braces, etc \"    Onset: generalized joint pain that has been for years'; but more so in the last year.  States elbow pain is new in the last week, left.   He also has hypotonia due to Prader Barry syndrome.  He also has unsteady gait for at least a year.   Sometimes bumps into the aponte, staggers.     Feels dizzy and light headedness. Uses the wall for balances. Happens when gets up too fast.   This occurs once or twice a day.     Hold on to railing when doing steps.     He has been ding exercises video daily for about ten years ago and after 2018 was hard to do the video due to pain.  Mom had  a TBI and this took them off track w the exercise program. States that he sometimes is able to walk 5 miles and sometimes not.  Some days feel good and some not so good. States he has more bad days.   Sleep is not always great.     States joint pains come and go. Elbow pain is new,. Complains more lower extremity and upper pain.  States pain is sharp.  Has an appt for sleep mediine.      He was last seen in march 2025. At that time we discussed:  Diffuse joint pain/arthralgia  - xr left elbow. Bk ankles, l spine  - angelo, esr, crp. Ra, ccp, vitamin d  - Pt water therapy for strengthening conditioning and gait   - voltaren gel prn for joint pain  - can trial either ankle support braces low profile vs high top shoes for more ankle stability    Angelo borderline other blood work negative, pending rheum appt. Water is better but not sustained. He is not doing to much strenuous activity.    Xr ankles and left elbow wnl. Lower back has facet arthropathy.     He was last seen in May " 2025. At that time we discusse:    Diffuse joint pain/arthralgia  - xr left elbow. Bk ankles, l spine; mild spondylosis in L spine; normal elbow and ankles; on personal review  - angelo, esr, crp. Ra, ccp, vitamin d (wnl); angelo borderline; other tests negative; pending rheum though less likely inflammatory arthritis  - Pt water therapy for strengthening conditioning and gait; continue can consider ace wrap for ankle stability; ? smo   - voltaren gel prn for joint pain  - can trial either ankle support braces low profile vs high top shoes for more ankle stability however currently difficulty w finances since mom is losing her job    While going to water therapy joints were better.   Per mom he has been doing exercises and stretches.     Imaging  Reviewed 07/25/25    XR R elbow 3/2025  FINDINGS:  Three views of left elbow were performed.      There is no acute fracture or dislocation. Radiocapitellar and  ulnotrochlear joint articulations are maintained. No significant  elbow joint effusion is noted.      IMPRESSION:  No acute osseous findings.      Xr l spine 3/2025  FINDINGS:  Lumbar vertebral body heights are maintained. No acute compression  fracture deformities are noted. The alignment of lumbar vertebral  bodies is intact. There is suggestion of mild discogenic degenerative  changes at L5-S1 level with mild facet arthropathy and mild loss of  disc space height. Prevertebral and paraspinal soft tissues appear  grossly unremarkable.      IMPRESSION:  Mild discogenic degenerative changes at L5-S1 level as described  above.  Bl ankles 3/2025  FINDINGS:  Three views of left ankle were performed.      Right ankle: There is no acute fracture or dislocation. Ankle mortise  is intact. No significant degenerative changes are noted in the  tibiotalar joint. No obvious soft tissue abnormality.      Left ankle: There is no acute fracture or dislocation. Ankle mortise  is intact. No significant degenerative changes are noted in  the  tibiotalar joint. No obvious soft tissue abnormality.      IMPRESSION:  No acute osseous findings in bilateral ankles.  Mri brain 7/2022  FINDINGS:  There is no evidence of a sellar or suprasellar mass.  The pituitary  gland enhances homogenously. Pituitary gland is slightly smaller in  size/shows mild hypoplasia. Trace deviation of the infundibulum to  the left. The optic chiasm is unremarkable.  Bilateral cavernous  sinuses demonstrate symmetric enhancement. Visualized internal  carotid arteries demonstrate expected flow voids. Visualized portions  of the intracranial structures on T2 weighted axial images are  unremarkable.     IMPRESSION:  No evidence of a sellar or suprasellar mass.  Past Medical History:   Diagnosis Date    Decreased white blood cell count, unspecified 09/20/2021    Leukopenia    Encounter for immunization 07/19/2016    Diphtheria-tetanus-pertussis (DTP) vaccination    Personal history of other infectious and parasitic diseases 09/29/2020    History of herpes simplex infection        No past surgical history on file.     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/2023    Allergic rhinitis 08/26/2023    Anemia 08/26/2023    Ataxia 08/26/2023    Attention-deficit/hyperactivity disorder 08/26/2023    Bilateral hip pain 08/26/2023    Bilateral knee pain 08/26/2023    Pain in both lower extremities 08/26/2023    Canker sore 08/26/2023    Central obesity 08/26/2023    Cervical lymphadenopathy 08/26/2023    Daytime somnolence 08/26/2023    Dental disorder 08/26/2023    Dog bite of face 08/26/2023    Dorsal cervical fat pad 08/26/2023    Foot sprain, left, initial encounter 08/26/2023    Elevated creatine kinase  level 08/26/2023    Gastric dysmotility 08/26/2023    Gastroparesis 08/26/2023    Hypercholesterolemia 08/26/2023    Hyperglycemia 08/26/2023    Hypogonadism in male 08/26/2023    Impairment of balance 08/26/2023    Injury of ankle and foot, left, initial encounter 08/26/2023    Intractable chronic migraine without aura and without status migrainosus 08/26/2023    Kyphosis, acquired 08/26/2023    Low testosterone in male 08/26/2023    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 08/26/2023    Muscle cramps 08/26/2023    Nasal dryness 08/26/2023    Nightmare disorder 08/26/2023    Obsessive compulsive disorder 08/26/2023    PTSD (post-traumatic stress disorder) 08/26/2023    Nocturnal leg movements 08/26/2023    Mental disorder 08/26/2023    Obesity 08/26/2023    Oppositional defiant disorder 08/26/2023    Poor sleep pattern 08/26/2023    Prader-Willi syndrome ([facility]) 08/26/2023    Prediabetes 08/26/2023    Psychological factor affecting physical condition 08/26/2023    Recurrent HSV (herpes simplex virus) 08/26/2023    Sleep-disordered breathing 08/26/2023    Superficial bacterial infection of skin 08/26/2023    Tinea corporis 08/26/2023    Tinea cruris 08/26/2023    Unsteady gait when walking 08/26/2023    Urinary incontinence 08/26/2023    Vitamin D deficiency 08/26/2023    Weight gain 08/26/2023    Witnessed episode of apnea 08/26/2023        Family History   Problem Relation Name Age of Onset    Asthma Mother      Hyperlipidemia Mother      Hypertension Mother      Migraines Mother      Asthma Father      Hypertension Father      Diabetes Father      Asthma Sister      Anxiety disorder Sister      Depression Sister      Migraines Sister      Diabetes Other          Current Outpatient Medications   Medication Sig Dispense Refill    acetylcysteine 600 mg capsule Take 1 capsule (600 mg) by mouth daily. (To increase the dose every few weeks /clinical response, Mother to contact   Usama for adjustments (MAX dose is 2,400mg/day) 60 capsule 3    Adderall XR 30 mg 24 hr capsule Take 1 capsule (30 mg) by mouth once daily in the morning. Do not crush or chew. 30 capsule 0    Adderall XR 30 mg 24 hr capsule Take 1 capsule (30 mg) by mouth once daily in the morning. Do not crush or chew. Do not fill before July 25, 2025. 30 capsule 0    [START ON 8/24/2025] Adderall XR 30 mg 24 hr capsule Take 1 capsule (30 mg) by mouth once daily in the morning. Do not crush or chew. Do not fill before August 24, 2025. 30 capsule 0    Adderall XR 5 mg 24 hr capsule Take 1 capsule (5 mg) by mouth once daily at noon. Take with meals. Do not crush or chew. 30 capsule 0    Adderall XR 5 mg 24 hr capsule Take 1 capsule (5 mg) by mouth once daily at noon. Take with meals. Do not crush or chew. Do not fill before July 25, 2025. 30 capsule 0    [START ON 8/24/2025] Adderall XR 5 mg 24 hr capsule Take 1 capsule (5 mg) by mouth once daily at noon. Take with meals. Do not crush or chew. Do not fill before August 24, 2025. 30 capsule 0    ARIPiprazole ER (Abilify Maintena) 400 mg injection Inject 400 mg into the muscle every 28 (twenty-eight) days. 1 each 3    blood sugar diagnostic (True Metrix Glucose Test Strip) Use 1 test strip to test blood glucose daily 100 each 3    blood-glucose meter (True Metrix Glucose Meter) misc use as directed 1 each 1    ergocalciferol (Vitamin D-2) 1250 mcg (50,000 units) capsule Take 1 capsule (50,000 Units) by mouth 1 (one) time per week. 12 capsule 1    ibuprofen (IBU) 600 mg tablet Take by mouth every 6 hours if needed.      ketoconazole (NIZOral) 2 % shampoo APPLY 1 APPLICATION EXTERNALLY TO SCALP 2-3 TIMES A WEEK FOR 2 WEEKS THEN ONCE WEEKLY FOR 2 WEEKS THEN AS NEEDED 120 mL 3    lancets (TRUEplus Lancets) 33 gauge misc Use 1 per day as directed. 100 each 3    meloxicam (Mobic) 7.5 mg tablet Take 1-2 caps once a day as needed 45 tablet 2    multivitamin with minerals (MULTIPLE  "VITAMIN-MINERALS ORAL) Take 1 tablet by mouth once daily.      prazosin (Minipress) 2 mg capsule Take 2 capsules (4 mg) by mouth once daily at bedtime. 60 capsule 3    sertraline (Zoloft) 100 mg tablet Take 2 tablets (200 mg) by mouth once daily. 60 tablet 3    sertraline (Zoloft) 50 mg tablet Take 1 tablet (50 mg) by mouth once daily. 30 tablet 3    testosterone cypionate (Depo-Testosterone) 200 mg/mL injection Inject 0.25 mL (50 mg) into the muscle every 7 days.      valACYclovir (Valtrex) 500 mg tablet Take 1 tablet (500 mg) by mouth once daily. 90 tablet 1    Adderall XR 30 mg 24 hr capsule Take 1 capsule (30 mg) by mouth once daily in the morning. Do not crush or chew. Do not fill before June 16, 2025. 30 capsule 0    Adderall XR 5 mg 24 hr capsule Take 1 capsule (5 mg) by mouth once daily in the morning. Do not crush or chew. Do not fill before March 25, 2025. 30 capsule 0     No current facility-administered medications for this visit.        Allergies   Allergen Reactions    Clonazepam Unknown    Dextroamphetamine-Amphetamine Unknown     generic Adderall didn't seem like it worked      Oxcarbazepine Psychosis     Mother reports:he had a \"psychotic breaK' (self mutilation behavior, biting of his fingers - self-injurious and aggressive behaviors - impulsivity - wanting to kill himself) on the generic Trileptal -     Ziprasidone Hcl Other and Hallucinations        Social History     Socioeconomic History    Marital status: Single   Tobacco Use    Smoking status: Never     Passive exposure: Never    Smokeless tobacco: Never   Substance and Sexual Activity    Alcohol use: Never    Drug use: Never   He was in a work program but difficulty w [employer] issues,.  Has services through [employer]  Lives w mom and sisters and nephews.      Review of Systems:  Constitutional:  Denies fever or chills, malaise, weight changes.   Eyes:  Denies change in visual acuity   HENT:  Denies nasal congestion or sore throat   Respiratory:  " Denies cough or shortness of breath   Cardiovascular:  Denies chest pain or edema   GI:  as per hpi  :  Denies dysuria   Integument:  Denies rash   Neurologic:  As per HPI  MSK: Per above HPI  Endocrine:  Denies polyuria or polydipsia   Lymphatic:  Denies swollen glands   Psychiatric:  as per hpi           PHYSICAL EXAM:  /78 (BP Location: Left arm, Patient Position: Sitting)   Pulse 92   Resp 22     General:  NAD, well developed    Psychiatric: appropriate mood & affect.   Cardiovascular:  Normal pedal pulses; no LE edema.  Respiratory:  Normal rate; unlabored breathing.  Skin:  Intact; no erythema; no ecchymosis or rash.  Lymphatic:  No lymphadenopathy or lymphedema.  NEURO:  Alert and appropriate. Speech fluent, conversing appropriately.   Motor:    Rt: HF 5/5, KE 5/5, KF 5/5, DF 5/5, EHL 5/5, PF 5/5.    Lt: HF 5/5, KE 5/5, KF 5/5, DF 5/5, EHL 5/5, PF 5/5.  Sensation:     Light touch: intact in the b/l L3-S1 dermatomes.    Reflexes:   Brisk ue and le reflexes  No joint redness or warmth to palpation    Babinski's downgoing b/l; no clonus  Gait: bl knee valgus deformity, bl pes planus, wide based gait    Full rom elbow and knees,    R leg intermittently appears to give out  Some mild recurvatum       Impression: Lonny Andrews is a 35 y.o. M w pmh of Has Prader-Willi Syndrome, adhd, OCD, cognitive impairment presenting with diffuse joint pains that seem to come and go that affect his ability to exercise. Appears sometimes some of this is behavioral, however his mom state it is happening more frequently and due to diffuseness of symptoms need to rule out an autoimmune process. Blood work mostly negative, pending rheum appt. Feeling much better w water therapy.     Plan:     Diffuse joint pain/arthralgia; much improved w water therapy  - xr left elbow. Bl ankles, l spine; mild spondylosis in L spine; normal elbow and ankles; on personal review  - angelo, esr, crp. Ra, ccp, vitamin d (wnl); angelo borderline;  other tests negative; missed rheum apptt; though less likely inflammatory arthritis  - Pt water therapy for strengthening conditioning and gait; continue can consider ace wrap for ankle stability; ? Smo  Encouraged water exercises and join gym in community   - voltaren gel prn for joint pain  - can trial either ankle support braces low profile vs high top shoes for more ankle stability however currently difficulty w finances since mom is losing her job       Santa Lawler MD  Physical Medicine and Rehabilitation

## 2025-07-26 DIAGNOSIS — F42.4 COMPULSIVE SKIN PICKING: ICD-10-CM

## 2025-07-28 RX ORDER — ACETYLCYSTEINE 600 MG
CAPSULE ORAL
Qty: 240 CAPSULE | Refills: 6 | Status: SHIPPED | OUTPATIENT
Start: 2025-07-28

## 2025-07-28 NOTE — TELEPHONE ENCOUNTER
Pharmacy requesting refills on the following medications.     Requested Prescriptions     Pending Prescriptions Disp Refills    acetylcysteine 600 mg capsule 60 capsule 3     Sig: Take 1 capsule (600 mg) by mouth daily. (To increase the dose every few weeks /clinical response, Mother to contact Dr Forrester for adjustments (MAX dose is 2,400mg/day)          Last office visit: 4/24/25  Next office visit: n/a    JARROD VIVAS MA

## 2025-07-30 ENCOUNTER — APPOINTMENT (OUTPATIENT)
Dept: OPHTHALMOLOGY | Facility: CLINIC | Age: 35
End: 2025-07-30
Payer: MEDICAID

## 2025-07-30 ENCOUNTER — CLINICAL SUPPORT (OUTPATIENT)
Dept: ENDOCRINOLOGY | Facility: CLINIC | Age: 35
End: 2025-07-30
Payer: MEDICAID

## 2025-07-30 DIAGNOSIS — E29.1 HYPOGONADISM IN MALE: ICD-10-CM

## 2025-07-30 PROCEDURE — 96372 THER/PROPH/DIAG INJ SC/IM: CPT | Performed by: INTERNAL MEDICINE

## 2025-07-30 RX ORDER — TESTOSTERONE CYPIONATE 200 MG/ML
100 INJECTION, SOLUTION INTRAMUSCULAR ONCE
Status: COMPLETED | OUTPATIENT
Start: 2025-07-30 | End: 2025-07-30

## 2025-07-30 RX ADMIN — TESTOSTERONE CYPIONATE 100 MG: 200 INJECTION, SOLUTION INTRAMUSCULAR at 10:15

## 2025-07-30 NOTE — PROGRESS NOTES
Received Testosterone in [facility].  Used the department of Medicine supply.

## 2025-08-04 DIAGNOSIS — E29.1 HYPOGONADISM MALE: ICD-10-CM

## 2025-08-04 DIAGNOSIS — Q87.11 PRADER-WILLI SYNDROME (HHS-HCC): Primary | ICD-10-CM

## 2025-08-05 DIAGNOSIS — H40.053 OCULAR HYPERTENSION, BILATERAL: Primary | ICD-10-CM

## 2025-08-05 PROCEDURE — RXMED WILLOW AMBULATORY MEDICATION CHARGE

## 2025-08-06 ENCOUNTER — APPOINTMENT (OUTPATIENT)
Dept: ENDOCRINOLOGY | Facility: CLINIC | Age: 35
End: 2025-08-06
Payer: MEDICAID

## 2025-08-06 DIAGNOSIS — H40.053 OCULAR HYPERTENSION, BILATERAL: Primary | ICD-10-CM

## 2025-08-06 DIAGNOSIS — E29.1 HYPOGONADISM MALE: ICD-10-CM

## 2025-08-06 PROCEDURE — 96372 THER/PROPH/DIAG INJ SC/IM: CPT | Performed by: INTERNAL MEDICINE

## 2025-08-06 RX ADMIN — TESTOSTERONE CYPIONATE 100 MG: 200 INJECTION, SOLUTION INTRAMUSCULAR at 11:50

## 2025-08-07 ENCOUNTER — PHARMACY VISIT (OUTPATIENT)
Dept: PHARMACY | Facility: CLINIC | Age: 35
End: 2025-08-07
Payer: COMMERCIAL

## 2025-08-07 ENCOUNTER — OFFICE VISIT (OUTPATIENT)
Dept: OPHTHALMOLOGY | Facility: CLINIC | Age: 35
End: 2025-08-07
Payer: MEDICAID

## 2025-08-07 DIAGNOSIS — H40.053 OCULAR HYPERTENSION, BILATERAL: Primary | ICD-10-CM

## 2025-08-07 PROCEDURE — 99213 OFFICE O/P EST LOW 20 MIN: CPT | Performed by: STUDENT IN AN ORGANIZED HEALTH CARE EDUCATION/TRAINING PROGRAM

## 2025-08-07 RX ORDER — TESTOSTERONE CYPIONATE 200 MG/ML
100 INJECTION, SOLUTION INTRAMUSCULAR ONCE
Status: COMPLETED | OUTPATIENT
Start: 2025-08-07 | End: 2025-08-06

## 2025-08-07 ASSESSMENT — CONF VISUAL FIELD
OD_SUPERIOR_TEMPORAL_RESTRICTION: 0
OS_NORMAL: 1
OD_NORMAL: 1
OD_INFERIOR_NASAL_RESTRICTION: 0
OS_SUPERIOR_NASAL_RESTRICTION: 0
OD_INFERIOR_TEMPORAL_RESTRICTION: 0
OS_SUPERIOR_TEMPORAL_RESTRICTION: 0
OD_SUPERIOR_NASAL_RESTRICTION: 0
OS_INFERIOR_NASAL_RESTRICTION: 0
OS_INFERIOR_TEMPORAL_RESTRICTION: 0

## 2025-08-07 ASSESSMENT — CUP TO DISC RATIO
OD_RATIO: .50
OS_RATIO: .50

## 2025-08-07 ASSESSMENT — EXTERNAL EXAM - RIGHT EYE: OD_EXAM: NORMAL

## 2025-08-07 ASSESSMENT — PACHYMETRY
OD_CT(UM): 524
OS_CT(UM): 490

## 2025-08-07 ASSESSMENT — ENCOUNTER SYMPTOMS
CONSTITUTIONAL NEGATIVE: 0
HEMATOLOGIC/LYMPHATIC NEGATIVE: 0
NEUROLOGICAL NEGATIVE: 0
RESPIRATORY NEGATIVE: 0
PSYCHIATRIC NEGATIVE: 0
GASTROINTESTINAL NEGATIVE: 0
ALLERGIC/IMMUNOLOGIC NEGATIVE: 0
MUSCULOSKELETAL NEGATIVE: 0
ENDOCRINE NEGATIVE: 0
CARDIOVASCULAR NEGATIVE: 0
EYES NEGATIVE: 0

## 2025-08-07 ASSESSMENT — TONOMETRY
IOP_METHOD: GOLDMANN APPLANATION
OS_IOP_MMHG: 18
OD_IOP_MMHG: 20

## 2025-08-07 ASSESSMENT — SLIT LAMP EXAM - LIDS
COMMENTS: NORMAL
COMMENTS: NORMAL

## 2025-08-07 ASSESSMENT — VISUAL ACUITY
OD_SC: 20/30
METHOD: SNELLEN - LINEAR
OS_SC: 20/30

## 2025-08-07 ASSESSMENT — EXTERNAL EXAM - LEFT EYE: OS_EXAM: NORMAL

## 2025-08-07 NOTE — PROGRESS NOTES
Assessment/Plan   Diagnoses and all orders for this visit:  Ocular hypertension, bilateral  -IOP today 20/18 c TMAX 25/42 c thinner PACHS 524/490  -latanoprost QHS OU started at exam 12/19/24-pt mother reports compliance; due to poorer response switched to rocklatan QHS OU at apt 4/23/25  -gonio 12/19/24 does not show signs of angle closure or previous angle closure attacks  -no anterior chamber reaction (-)glaucomatocyclitc crisis  -ONH appearance with distinct NRR-no disc hemes on uDFE today  -Baseline OCT RNFL 12/19/24 within normal limits for both eyes  -Baseline HVF 24-2 4/2025 with high fixation losses, ? Reliability OD: SN/IN defects OS: IN and central defects   -Tgoal roughly at OD 20 OS 26  -continue Rocklatan QHS OU-IOP at acceptable levels today      RTC for OCT RNFL and IOP check 3 months

## 2025-08-08 ENCOUNTER — TELEPHONE (OUTPATIENT)
Dept: GENETICS | Facility: CLINIC | Age: 35
End: 2025-08-08
Payer: MEDICAID

## 2025-08-08 NOTE — TELEPHONE ENCOUNTER
Genetics note:    Mother reports bilateral feet and calve swelling. There is no pain, redness, or dyspnea. This is likely from Vykat as it is a common side effect. He is taking 375mg, which is the target dose. Reviewed that leg swelling from Vykat is always transient. Will decrease the dose to 300mg/day. We can keep the 300mg dose for now. From my discussion with a PWS expert, the improvement in hyperphagia can be observed with lower dose, when the dose is reduced due to leg swelling.     Reviewed that in individuals with PWS, there is an increased risk of DVT. Given bilateral findings, the temporal association between Vykat, and the lack of pain/redness/dyspnea, will continue to observe. If symptoms get worse or if there is pain/redness/dyspnea, recommend that he visit ER.     Will contact mother in 1 mo.     Carrington Forrester MD  Medical Geneticist

## 2025-08-10 LAB
TESTOST FREE SERPL-MCNC: 161.2 PG/ML (ref 35–155)
TESTOST SERPL-MCNC: 954 NG/DL (ref 250–1100)

## 2025-08-12 ENCOUNTER — APPOINTMENT (OUTPATIENT)
Dept: BEHAVIORAL HEALTH | Facility: CLINIC | Age: 35
End: 2025-08-12
Payer: MEDICAID

## 2025-08-12 VITALS
WEIGHT: 166.8 LBS | BODY MASS INDEX: 34.87 KG/M2 | DIASTOLIC BLOOD PRESSURE: 83 MMHG | HEART RATE: 100 BPM | RESPIRATION RATE: 18 BRPM | SYSTOLIC BLOOD PRESSURE: 127 MMHG | TEMPERATURE: 97.8 F

## 2025-08-12 DIAGNOSIS — Q87.11 PRADER-WILLI SYNDROME (HHS-HCC): ICD-10-CM

## 2025-08-12 DIAGNOSIS — E29.1 HYPOGONADISM IN MALE: Primary | ICD-10-CM

## 2025-08-12 DIAGNOSIS — F06.31 MOOD DISORDER WITH DEPRESSIVE FEATURES DUE TO GENERAL MEDICAL CONDITION: Primary | ICD-10-CM

## 2025-08-12 RX ORDER — TESTOSTERONE CYPIONATE 200 MG/ML
50 INJECTION, SOLUTION INTRAMUSCULAR
Status: SHIPPED | OUTPATIENT
Start: 2025-08-20 | End: 2026-02-18

## 2025-08-12 ASSESSMENT — PAIN SCALES - GENERAL: PAINLEVEL_OUTOF10: 0-NO PAIN

## 2025-08-12 ASSESSMENT — COLUMBIA-SUICIDE SEVERITY RATING SCALE - C-SSRS
1. SINCE LAST CONTACT, HAVE YOU WISHED YOU WERE DEAD OR WISHED YOU COULD GO TO SLEEP AND NOT WAKE UP?: NO
TOTAL  NUMBER OF ABORTED OR SELF INTERRUPTED ATTEMPTS SINCE LAST CONTACT: NO
ATTEMPT SINCE LAST CONTACT: NO
SUICIDE, SINCE LAST CONTACT: NO
TOTAL  NUMBER OF INTERRUPTED ATTEMPTS SINCE LAST CONTACT: NO
6. HAVE YOU EVER DONE ANYTHING, STARTED TO DO ANYTHING, OR PREPARED TO DO ANYTHING TO END YOUR LIFE?: NO

## 2025-08-13 ENCOUNTER — APPOINTMENT (OUTPATIENT)
Dept: ENDOCRINOLOGY | Facility: CLINIC | Age: 35
End: 2025-08-13
Payer: MEDICAID

## 2025-08-20 ENCOUNTER — APPOINTMENT (OUTPATIENT)
Dept: ENDOCRINOLOGY | Facility: CLINIC | Age: 35
End: 2025-08-20
Payer: MEDICAID

## 2025-08-20 DIAGNOSIS — E29.1 HYPOGONADISM MALE: ICD-10-CM

## 2025-08-20 PROCEDURE — 96372 THER/PROPH/DIAG INJ SC/IM: CPT | Performed by: INTERNAL MEDICINE

## 2025-08-20 RX ADMIN — TESTOSTERONE CYPIONATE 50 MG: 200 INJECTION, SOLUTION INTRAMUSCULAR at 09:05

## 2025-08-27 ENCOUNTER — APPOINTMENT (OUTPATIENT)
Dept: ENDOCRINOLOGY | Facility: CLINIC | Age: 35
End: 2025-08-27
Payer: MEDICAID

## 2025-08-27 DIAGNOSIS — E29.1 HYPOGONADISM MALE: ICD-10-CM

## 2025-08-27 PROCEDURE — RXMED WILLOW AMBULATORY MEDICATION CHARGE

## 2025-08-27 PROCEDURE — 96372 THER/PROPH/DIAG INJ SC/IM: CPT | Performed by: INTERNAL MEDICINE

## 2025-08-27 RX ADMIN — TESTOSTERONE CYPIONATE 50 MG: 200 INJECTION, SOLUTION INTRAMUSCULAR at 10:40

## 2025-09-01 ENCOUNTER — PHARMACY VISIT (OUTPATIENT)
Dept: PHARMACY | Facility: CLINIC | Age: 35
End: 2025-09-01
Payer: COMMERCIAL

## 2025-09-03 ENCOUNTER — APPOINTMENT (OUTPATIENT)
Dept: ENDOCRINOLOGY | Facility: CLINIC | Age: 35
End: 2025-09-03
Payer: MEDICAID

## 2025-09-09 ENCOUNTER — APPOINTMENT (OUTPATIENT)
Dept: BEHAVIORAL HEALTH | Facility: CLINIC | Age: 35
End: 2025-09-09
Payer: MEDICAID

## 2025-09-10 ENCOUNTER — APPOINTMENT (OUTPATIENT)
Dept: ENDOCRINOLOGY | Facility: CLINIC | Age: 35
End: 2025-09-10
Payer: MEDICAID

## 2025-09-15 ENCOUNTER — APPOINTMENT (OUTPATIENT)
Dept: RHEUMATOLOGY | Facility: CLINIC | Age: 35
End: 2025-09-15
Payer: MEDICAID

## 2025-09-17 ENCOUNTER — APPOINTMENT (OUTPATIENT)
Dept: ENDOCRINOLOGY | Facility: CLINIC | Age: 35
End: 2025-09-17
Payer: MEDICAID

## 2025-09-24 ENCOUNTER — APPOINTMENT (OUTPATIENT)
Dept: BEHAVIORAL HEALTH | Facility: CLINIC | Age: 35
End: 2025-09-24
Payer: MEDICAID

## 2025-09-26 ENCOUNTER — APPOINTMENT (OUTPATIENT)
Dept: OPHTHALMOLOGY | Facility: CLINIC | Age: 35
End: 2025-09-26
Payer: MEDICAID

## 2025-10-01 ENCOUNTER — APPOINTMENT (OUTPATIENT)
Dept: ENDOCRINOLOGY | Facility: CLINIC | Age: 35
End: 2025-10-01
Payer: MEDICAID

## 2025-10-08 ENCOUNTER — APPOINTMENT (OUTPATIENT)
Dept: ENDOCRINOLOGY | Facility: CLINIC | Age: 35
End: 2025-10-08
Payer: MEDICAID

## 2025-10-15 ENCOUNTER — APPOINTMENT (OUTPATIENT)
Dept: ENDOCRINOLOGY | Facility: CLINIC | Age: 35
End: 2025-10-15
Payer: MEDICAID

## 2025-10-22 ENCOUNTER — APPOINTMENT (OUTPATIENT)
Dept: ENDOCRINOLOGY | Facility: CLINIC | Age: 35
End: 2025-10-22
Payer: MEDICAID

## 2025-10-29 ENCOUNTER — APPOINTMENT (OUTPATIENT)
Dept: ENDOCRINOLOGY | Facility: CLINIC | Age: 35
End: 2025-10-29
Payer: MEDICAID

## 2025-11-05 ENCOUNTER — APPOINTMENT (OUTPATIENT)
Dept: ENDOCRINOLOGY | Facility: CLINIC | Age: 35
End: 2025-11-05
Payer: MEDICAID

## 2025-11-07 ENCOUNTER — APPOINTMENT (OUTPATIENT)
Dept: OPHTHALMOLOGY | Facility: CLINIC | Age: 35
End: 2025-11-07
Payer: MEDICAID

## 2025-11-12 ENCOUNTER — APPOINTMENT (OUTPATIENT)
Dept: ENDOCRINOLOGY | Facility: CLINIC | Age: 35
End: 2025-11-12
Payer: MEDICAID

## 2025-11-14 ENCOUNTER — APPOINTMENT (OUTPATIENT)
Dept: ENDOCRINOLOGY | Facility: CLINIC | Age: 35
End: 2025-11-14
Payer: MEDICAID

## 2025-11-19 ENCOUNTER — APPOINTMENT (OUTPATIENT)
Dept: ENDOCRINOLOGY | Facility: CLINIC | Age: 35
End: 2025-11-19
Payer: MEDICAID

## 2025-11-26 ENCOUNTER — APPOINTMENT (OUTPATIENT)
Dept: ENDOCRINOLOGY | Facility: CLINIC | Age: 35
End: 2025-11-26
Payer: MEDICAID

## 2025-12-03 ENCOUNTER — APPOINTMENT (OUTPATIENT)
Dept: ENDOCRINOLOGY | Facility: CLINIC | Age: 35
End: 2025-12-03
Payer: MEDICAID

## 2025-12-10 ENCOUNTER — APPOINTMENT (OUTPATIENT)
Dept: PRIMARY CARE | Facility: CLINIC | Age: 35
End: 2025-12-10
Payer: MEDICAID

## 2025-12-17 ENCOUNTER — APPOINTMENT (OUTPATIENT)
Dept: PRIMARY CARE | Facility: CLINIC | Age: 35
End: 2025-12-17
Payer: MEDICAID